# Patient Record
Sex: FEMALE | Race: WHITE | NOT HISPANIC OR LATINO | Employment: OTHER | URBAN - NONMETROPOLITAN AREA
[De-identification: names, ages, dates, MRNs, and addresses within clinical notes are randomized per-mention and may not be internally consistent; named-entity substitution may affect disease eponyms.]

---

## 2023-04-03 ENCOUNTER — APPOINTMENT (OUTPATIENT)
Dept: PRIMARY CARE | Facility: CLINIC | Age: 68
End: 2023-04-03
Payer: MEDICARE

## 2023-04-03 PROBLEM — L91.8 SKIN TAGS, MULTIPLE ACQUIRED: Status: ACTIVE | Noted: 2023-04-03

## 2023-04-03 PROBLEM — R79.89 ABNORMAL LFTS (LIVER FUNCTION TESTS): Status: ACTIVE | Noted: 2023-04-03

## 2023-04-03 PROBLEM — N18.32 STAGE 3B CHRONIC KIDNEY DISEASE (MULTI): Status: ACTIVE | Noted: 2023-04-03

## 2023-04-03 PROBLEM — M79.605 LEFT LEG PAIN: Status: ACTIVE | Noted: 2023-04-03

## 2023-04-03 PROBLEM — M79.89 SWELLING OF LEFT LOWER EXTREMITY: Status: ACTIVE | Noted: 2023-04-03

## 2023-04-03 PROBLEM — N28.9 RENAL INSUFFICIENCY, MILD: Status: ACTIVE | Noted: 2023-04-03

## 2023-04-03 PROBLEM — M25.551 RIGHT HIP PAIN: Status: ACTIVE | Noted: 2023-04-03

## 2023-04-03 PROBLEM — G47.419 NARCOLEPSY (HHS-HCC): Status: ACTIVE | Noted: 2023-04-03

## 2023-04-03 PROBLEM — R29.898 LEG WEAKNESS: Status: ACTIVE | Noted: 2023-04-03

## 2023-04-03 PROBLEM — T84.84XA PAIN DUE TO TOTAL KNEE REPLACEMENT, INITIAL ENCOUNTER (CMS-HCC): Status: ACTIVE | Noted: 2023-04-03

## 2023-04-03 PROBLEM — E04.9 GOITER: Status: ACTIVE | Noted: 2023-04-03

## 2023-04-03 PROBLEM — M25.562 LEFT KNEE PAIN: Status: ACTIVE | Noted: 2023-04-03

## 2023-04-03 PROBLEM — E04.1 THYROID NODULE: Status: ACTIVE | Noted: 2023-04-03

## 2023-04-03 PROBLEM — M79.672 FOOT PAIN, LEFT: Status: ACTIVE | Noted: 2023-04-03

## 2023-04-03 PROBLEM — G25.81 RESTLESS LEGS SYNDROME (RLS): Status: ACTIVE | Noted: 2023-04-03

## 2023-04-03 PROBLEM — E86.0 DEHYDRATION: Status: ACTIVE | Noted: 2023-04-03

## 2023-04-03 PROBLEM — L71.9 ROSACEA: Status: ACTIVE | Noted: 2023-04-03

## 2023-04-03 PROBLEM — I10 ESSENTIAL (PRIMARY) HYPERTENSION: Status: ACTIVE | Noted: 2023-04-03

## 2023-04-03 PROBLEM — J45.909 ASTHMATIC BRONCHITIS (HHS-HCC): Status: ACTIVE | Noted: 2023-04-03

## 2023-04-03 PROBLEM — M19.90 ARTHRITIS: Status: ACTIVE | Noted: 2023-04-03

## 2023-04-03 PROBLEM — Z96.659 PAIN DUE TO TOTAL KNEE REPLACEMENT, INITIAL ENCOUNTER (CMS-HCC): Status: ACTIVE | Noted: 2023-04-03

## 2023-04-03 PROBLEM — D64.9 ANEMIA: Status: ACTIVE | Noted: 2023-04-03

## 2023-04-03 PROBLEM — T50.905S: Status: ACTIVE | Noted: 2023-04-03

## 2023-04-03 RX ORDER — AMOXICILLIN 500 MG/1
1 CAPSULE ORAL DAILY
COMMUNITY
End: 2023-09-15 | Stop reason: SDUPTHER

## 2023-04-03 RX ORDER — NAPROXEN SODIUM 220 MG/1
TABLET, FILM COATED ORAL
COMMUNITY
End: 2024-02-14 | Stop reason: WASHOUT

## 2023-04-03 RX ORDER — ROPINIROLE 2 MG/1
1 TABLET, FILM COATED ORAL NIGHTLY
Status: ON HOLD | COMMUNITY

## 2023-04-03 RX ORDER — DEXTROAMPHETAMINE SACCHARATE, AMPHETAMINE ASPARTATE, DEXTROAMPHETAMINE SULFATE AND AMPHETAMINE SULFATE 5; 5; 5; 5 MG/1; MG/1; MG/1; MG/1
1 TABLET ORAL DAILY
Status: ON HOLD | COMMUNITY

## 2023-04-03 RX ORDER — AMLODIPINE BESYLATE 5 MG/1
1 TABLET ORAL NIGHTLY
Status: ON HOLD | COMMUNITY

## 2023-04-03 RX ORDER — PAROXETINE HYDROCHLORIDE 20 MG/1
2 TABLET, FILM COATED ORAL DAILY
COMMUNITY
End: 2023-10-31

## 2023-04-03 RX ORDER — CYCLOBENZAPRINE HCL 10 MG
1 TABLET ORAL NIGHTLY
COMMUNITY
End: 2024-05-23 | Stop reason: ALTCHOICE

## 2023-04-03 RX ORDER — GLUCOSAMINE/CHONDR SU A SOD 167-133 MG
500 CAPSULE ORAL
Status: ON HOLD | COMMUNITY

## 2023-04-03 RX ORDER — DEXTROAMPHETAMINE SACCHARATE, AMPHETAMINE ASPARTATE, DEXTROAMPHETAMINE SULFATE AND AMPHETAMINE SULFATE 2.5; 2.5; 2.5; 2.5 MG/1; MG/1; MG/1; MG/1
10 TABLET ORAL 3 TIMES DAILY
Status: ON HOLD | COMMUNITY

## 2023-04-03 RX ORDER — MELATONIN 3 MG
3 CAPSULE ORAL NIGHTLY
Status: ON HOLD | COMMUNITY

## 2023-04-03 RX ORDER — PITOLISANT HYDROCHLORIDE 17.8 MG/1
2 TABLET, FILM COATED ORAL EVERY MORNING
Status: ON HOLD | COMMUNITY

## 2023-09-15 DIAGNOSIS — L71.9 ROSACEA: Primary | ICD-10-CM

## 2023-09-18 RX ORDER — AMOXICILLIN 500 MG/1
500 CAPSULE ORAL DAILY
Qty: 60 CAPSULE | Refills: 0 | Status: SHIPPED | OUTPATIENT
Start: 2023-09-18 | End: 2023-12-01 | Stop reason: SDUPTHER

## 2023-10-18 ENCOUNTER — TELEPHONE (OUTPATIENT)
Dept: PRIMARY CARE | Facility: CLINIC | Age: 68
End: 2023-10-18
Payer: MEDICARE

## 2023-10-23 ENCOUNTER — APPOINTMENT (OUTPATIENT)
Dept: PRIMARY CARE | Facility: CLINIC | Age: 68
End: 2023-10-23
Payer: MEDICARE

## 2023-10-31 ENCOUNTER — TELEPHONE (OUTPATIENT)
Dept: PRIMARY CARE | Facility: CLINIC | Age: 68
End: 2023-10-31

## 2023-10-31 ENCOUNTER — OFFICE VISIT (OUTPATIENT)
Dept: PRIMARY CARE | Facility: CLINIC | Age: 68
End: 2023-10-31
Payer: MEDICARE

## 2023-10-31 VITALS
OXYGEN SATURATION: 98 % | TEMPERATURE: 97.4 F | BODY MASS INDEX: 41.52 KG/M2 | DIASTOLIC BLOOD PRESSURE: 84 MMHG | HEART RATE: 98 BPM | WEIGHT: 227 LBS | SYSTOLIC BLOOD PRESSURE: 134 MMHG

## 2023-10-31 DIAGNOSIS — M16.0 PRIMARY OSTEOARTHRITIS OF BOTH HIPS: Primary | ICD-10-CM

## 2023-10-31 PROCEDURE — 3079F DIAST BP 80-89 MM HG: CPT | Performed by: FAMILY MEDICINE

## 2023-10-31 PROCEDURE — 99214 OFFICE O/P EST MOD 30 MIN: CPT | Performed by: FAMILY MEDICINE

## 2023-10-31 PROCEDURE — 1159F MED LIST DOCD IN RCRD: CPT | Performed by: FAMILY MEDICINE

## 2023-10-31 PROCEDURE — 1036F TOBACCO NON-USER: CPT | Performed by: FAMILY MEDICINE

## 2023-10-31 PROCEDURE — 3075F SYST BP GE 130 - 139MM HG: CPT | Performed by: FAMILY MEDICINE

## 2023-10-31 RX ORDER — MAGNESIUM 250 MG
250 TABLET ORAL DAILY
Status: ON HOLD | COMMUNITY

## 2023-10-31 RX ORDER — ASCORBIC ACID 500 MG
1000 TABLET ORAL
Status: ON HOLD | COMMUNITY

## 2023-10-31 RX ORDER — PAROXETINE 30 MG/1
TABLET, FILM COATED ORAL
COMMUNITY
End: 2024-02-14 | Stop reason: WASHOUT

## 2023-10-31 RX ORDER — ASPIRIN 81 MG/1
81 TABLET ORAL NIGHTLY
Status: ON HOLD | COMMUNITY

## 2023-10-31 ASSESSMENT — ENCOUNTER SYMPTOMS
EYE DISCHARGE: 0
WHEEZING: 0
HEADACHES: 0
DIZZINESS: 0
COUGH: 0
JOINT SWELLING: 0
RHINORRHEA: 0
ABDOMINAL PAIN: 0
UNEXPECTED WEIGHT CHANGE: 0
BLOOD IN STOOL: 0
PALPITATIONS: 0
DIARRHEA: 0
ARTHRALGIAS: 1
NAUSEA: 0
MYALGIAS: 0
LIGHT-HEADEDNESS: 0
WEAKNESS: 0
BACK PAIN: 1
NERVOUS/ANXIOUS: 0
HEMATURIA: 0
FLANK PAIN: 0
ACTIVITY CHANGE: 0
SHORTNESS OF BREATH: 0
SLEEP DISTURBANCE: 0
DYSURIA: 0
DYSPHORIC MOOD: 0
FEVER: 0
SORE THROAT: 0
CONSTIPATION: 0
VOMITING: 0
EYE ITCHING: 0
NUMBNESS: 0
APPETITE CHANGE: 0
SINUS PRESSURE: 0

## 2023-10-31 NOTE — PROGRESS NOTES
Subjective   Patient ID: Jojo Martin is a 68 y.o. female who presents for Nausea (Once every 6 months. All day.  [place] water helps. Since Chemo.) and Arthritis (R groin pain has flared up again. 7 weeks currently./).    Arthritis  She reports no joint swelling. Pertinent negatives include no diarrhea, dysuria, fever or rash.    always the right side     Review of Systems   Constitutional:  Negative for activity change, appetite change, fever and unexpected weight change.   HENT:  Negative for congestion, ear pain, postnasal drip, rhinorrhea, sinus pressure and sore throat.    Eyes:  Negative for discharge, itching and visual disturbance.   Respiratory:  Negative for cough, shortness of breath and wheezing.    Cardiovascular:  Negative for chest pain, palpitations and leg swelling.   Gastrointestinal:  Negative for abdominal pain, blood in stool, constipation, diarrhea, nausea and vomiting.   Endocrine: Negative for cold intolerance, heat intolerance and polyuria.   Genitourinary:  Negative for dysuria, flank pain and hematuria.   Musculoskeletal:  Positive for arthralgias, arthritis and back pain. Negative for gait problem, joint swelling and myalgias.   Skin:  Negative for rash.   Allergic/Immunologic: Negative for environmental allergies and food allergies.   Neurological:  Negative for dizziness, syncope, weakness, light-headedness, numbness and headaches.   Psychiatric/Behavioral:  Negative for dysphoric mood and sleep disturbance. The patient is not nervous/anxious.        Objective   /84   Pulse 98   Temp 36.3 °C (97.4 °F)   Wt 103 kg (227 lb)   SpO2 98%   BMI 41.52 kg/m²     Physical Exam  Vitals and nursing note reviewed.   Constitutional:       Appearance: Normal appearance.   HENT:      Head: Normocephalic.      Mouth/Throat:      Mouth: Mucous membranes are moist.   Cardiovascular:      Rate and Rhythm: Normal rate and regular rhythm.      Pulses: Normal pulses.      Heart sounds: Normal  heart sounds. No murmur heard.     No friction rub. No gallop.   Pulmonary:      Effort: Pulmonary effort is normal. No respiratory distress.      Breath sounds: Normal breath sounds. No wheezing.   Abdominal:      General: Bowel sounds are normal. There is no distension.      Palpations: Abdomen is soft.      Tenderness: There is no abdominal tenderness.   Musculoskeletal:         General: No deformity. Normal range of motion.   Skin:     General: Skin is warm and dry.      Capillary Refill: Capillary refill takes less than 2 seconds.   Neurological:      General: No focal deficit present.      Mental Status: She is alert and oriented to person, place, and time.   Psychiatric:         Mood and Affect: Mood normal.       Assessment/Plan   Diagnoses and all orders for this visit:  Primary osteoarthritis of both hips

## 2023-11-01 DIAGNOSIS — M19.90 ARTHRITIS: ICD-10-CM

## 2023-11-01 DIAGNOSIS — N18.32 STAGE 3B CHRONIC KIDNEY DISEASE (MULTI): ICD-10-CM

## 2023-11-01 DIAGNOSIS — M25.551 RIGHT HIP PAIN: Primary | ICD-10-CM

## 2023-11-01 RX ORDER — MELOXICAM 7.5 MG/1
7.5 TABLET ORAL DAILY
Qty: 30 TABLET | Refills: 11 | Status: ON HOLD | OUTPATIENT
Start: 2023-11-01 | End: 2024-10-31

## 2023-11-02 NOTE — TELEPHONE ENCOUNTER
Jojo called and stated that she cannot take Mobic.  She says there is Motrin in it and she has had a bad reaction over 30 yrs ago with it.   She is requesting Etodolac to be sent if possible.

## 2023-11-15 ENCOUNTER — TELEPHONE (OUTPATIENT)
Dept: PRIMARY CARE | Facility: CLINIC | Age: 68
End: 2023-11-15
Payer: MEDICARE

## 2023-11-15 DIAGNOSIS — M19.90 ARTHRITIS: ICD-10-CM

## 2023-11-15 DIAGNOSIS — M25.551 RIGHT HIP PAIN: Primary | ICD-10-CM

## 2023-11-15 DIAGNOSIS — M25.551 RIGHT HIP PAIN: ICD-10-CM

## 2023-11-15 NOTE — TELEPHONE ENCOUNTER
Jojo called asking if she can get a referral to PT for her arthritis in her hips. She was seen 10/30/23 for this.

## 2023-11-29 ENCOUNTER — EVALUATION (OUTPATIENT)
Dept: PHYSICAL THERAPY | Facility: HOSPITAL | Age: 68
End: 2023-11-29
Payer: MEDICARE

## 2023-11-29 DIAGNOSIS — M25.551 RIGHT HIP PAIN: ICD-10-CM

## 2023-11-29 PROCEDURE — 97110 THERAPEUTIC EXERCISES: CPT | Mod: GP

## 2023-11-29 PROCEDURE — 97162 PT EVAL MOD COMPLEX 30 MIN: CPT | Mod: GP

## 2023-11-29 ASSESSMENT — PAIN SCALES - GENERAL: PAINLEVEL_OUTOF10: 7

## 2023-11-29 ASSESSMENT — PAIN - FUNCTIONAL ASSESSMENT: PAIN_FUNCTIONAL_ASSESSMENT: 0-10

## 2023-11-29 ASSESSMENT — PAIN DESCRIPTION - DESCRIPTORS: DESCRIPTORS: DULL

## 2023-11-29 NOTE — PROGRESS NOTES
Physical Therapy    Physical Therapy Evaluation and Treatment      Patient Name: Jojo Martin  MRN: [id number]  Today's Date: 11/29/2023  Time Calculation  Start Time: 1345  Stop Time: 1430  Time Calculation (min): 45 min    Assessment:  The patient was evaluated and found to have significant functional mobility deficits which interfere with their ability to perform their required ADL and leisure activities. They will benefit from a course of skilled Physical Therapy to improve musculoskeletal function and allow return to these activities at maximal achievable level and as close to their prior level of function as possible.     PT Assessment    PT Assessment Results: Decreased strength, Decreased range of motion, Decreased endurance, Impaired balance, Decreased mobility, Pain  Rehab Prognosis: Good  Evaluation/Treatment Tolerance: Patient tolerated treatment well     Plan:  OP PT Plan  Treatment/Interventions: Education/ Instruction, Gait training, Therapeutic activities, Therapeutic exercises  PT Plan: Skilled PT  PT Frequency: 2 times per week  Duration: 4 wks  Rehab Potential: Good    Current Problem:   1. Right hip pain  Referral to Physical Therapy    Follow Up In Physical Therapy          Subjective    General:  General  Reason for Referral: Eval and treat 2/2 R hip pain    Referred By: Dr. Greenfield    Past Medical History Relevant to Rehab: Patient had onset R hip pain July 2022 of insidious onset. [place] and OA was diagnosed. Confirmed diagnosis with her PCP. The patient has had recent recurrence of pain over last 2 months.  General Comment: Patient hopes to reduce pain if able.    Precautions:  Precautions  LE Weight Bearing Status: Weight Bearing as Tolerated    Pain:  Pain Assessment  Pain Assessment: 0-10  Pain Score: 7  Pain Location: Groin  Pain Orientation: Right  Pain Descriptors: Dull  Pain Frequency: Constant/continuous  Clinical Progression: Gradually worsening  Effect of Pain  on Daily Activities: Patient reducing time spent in standing/walking.     Prior Level of Function:  Prior Function Per Pt/Caregiver Report  Level of [place]: Independent with ADLs and functional transfers  Vocational: On disability  Leisure: sewing    Objective   General Assessments:  Posture Comment: increase lumbar lordosis posture    Range of Motion Comments: WFL    Strength Comments: 4/5 hip flexion/abduction and extension.    Flexibility Comment: tight anterior hip and anterior thigh musculature.    Functional Assessments:  Gait Comment: Uses QC on occasion but did not think to bring today.   and Balance Comment: L 8 sec, R 9 sec    Outcome Measures:  Timed Up and Go Test  TUG Manual: 15.7    Other Measures  5x Sit to Stand: 24 sec  Lower Extremity Funtional Score (LEFS): 7     Treatments:  Therapeutic Exercise  Therapeutic Exercise Performed: Yes  Therapeutic Exercise Activity 1: Pro2 bike 5 min  Therapeutic Exercise Activity 2: anterior hip stretch in standing 2x15 sec bilaterally.    EDUCATION:  Outpatient Education  Education Provided: Anatomy, Body Mechanics    Goals:  Active       PT Problem       The patient will demonstrate full understanding and competent performance of their home exercise program to maintain or potentially further improve their presenting condition.        Start:  11/29/23    Expected End:  12/29/23            The patient will report a reduction in pain to <1/10 to allow them to return to required/desired ADL activities.        Start:  11/29/23    Expected End:  12/29/23            The patient will ambulate 10 minutes continuously without pain increasing to greater than 1/10 to allow return to required community ambulation tasks.        Start:  11/29/23    Expected End:  12/29/23            The patient will improve their performance in the 5 time sit to stand test to < 15 sec to demonstrate increased balance and LE strength required for safe functional transfers and gait.         Start:  11/29/23    Expected End:  12/29/23            The patient will decrease their time in the timed up and go to <15 seconds to demonstrate improved balance and functional ambulatory ability.        Start:  11/29/23    Expected End:  12/29/23            Increase LEFS to >55       Start:  11/29/23    Expected End:  12/29/23            The patient will improve their time in unipedal stance to >= 5 to demonstrate increased balance with it's commensurate improvement in gait safety.        Start:  11/29/23    Expected End:  12/29/23

## 2023-12-01 DIAGNOSIS — L71.9 ROSACEA: ICD-10-CM

## 2023-12-01 RX ORDER — AMOXICILLIN 500 MG/1
500 CAPSULE ORAL DAILY
Qty: 60 CAPSULE | Refills: 0 | Status: SHIPPED | OUTPATIENT
Start: 2023-12-01 | End: 2024-02-28 | Stop reason: SDUPTHER

## 2023-12-05 ENCOUNTER — APPOINTMENT (OUTPATIENT)
Dept: PHYSICAL THERAPY | Facility: HOSPITAL | Age: 68
End: 2023-12-05
Payer: MEDICARE

## 2023-12-06 ENCOUNTER — TREATMENT (OUTPATIENT)
Dept: PHYSICAL THERAPY | Facility: HOSPITAL | Age: 68
End: 2023-12-06
Payer: MEDICARE

## 2023-12-06 DIAGNOSIS — M25.551 RIGHT HIP PAIN: ICD-10-CM

## 2023-12-06 PROCEDURE — 97110 THERAPEUTIC EXERCISES: CPT | Mod: GP

## 2023-12-06 ASSESSMENT — PAIN SCALES - GENERAL: PAINLEVEL_OUTOF10: 0 - NO PAIN

## 2023-12-06 ASSESSMENT — PAIN - FUNCTIONAL ASSESSMENT: PAIN_FUNCTIONAL_ASSESSMENT: 0-10

## 2023-12-06 NOTE — PROGRESS NOTES
Physical Therapy    Physical Therapy Treatment    Patient Name: Jojo Martin  MRN: [id number]  Today's Date: 12/6/2023  Time Calculation  Start Time: 1350  Stop Time: 1430  Time Calculation (min): 40 min      Assessment:  PT Assessment  Assessment Comment: Patient given HEP with clarification of technique today.    Plan:  OP PT Plan  Rehab Potential: Good    Current Problem  1. Right hip pain  Follow Up In Physical Therapy          General  PT  Visit  PT Received On: 12/06/23  Response to Previous Treatment: Patient with no complaints from previous session. (Patient felt stretching was beneficial and reduced pain.)       Subjective    Pain  Pain Assessment  Pain Assessment: 0-10  Pain Score: 0 - No pain    Objective   Activity Tolerance:  Activity Tolerance  Activity Tolerance Comments: tolerated increased volume of ex without complaint today.    Treatments:  Therapeutic Exercise  Therapeutic Exercise Performed: Yes  Therapeutic Exercise Activity 1: Physiosstep x 10  Therapeutic Exercise Activity 2: EOB HS [id number] sec  Therapeutic Exercise Activity 3: 90/90 R hip ER stretch 3x15 sec  Therapeutic Exercise Activity 4: standing rectus stretch 3x15 sec  Therapeutic Exercise Activity 5: hip abductor [id number]  Therapeutic Exercise Activity 6: standing hip abduction 3x10  Therapeutic Exercise Activity 7: standing hip hike 3x10    OP EDUCATION:  Outpatient Education  Individual(s) Educated: Patient  Education Provided: Anatomy, Home Exercise Program  Patient Response to Education: Patient/Caregiver Verbalized Understanding of Information  Access Code: [email or web address]  URL: [email or web address]  Date: 12/06/2023  Prepared by: Devante Richard    Exercises  - Seated Table Hamstring Stretch  - 1 x daily - 7 x weekly - 3 sets - 1 reps - 15 hold  - Standing Hip ER Stretch at Table  - 1 x daily - 7 x weekly - 3 sets - 1 reps - 15 hold  - Quadriceps Stretch with Table  - 1 x daily - 7 x weekly - 3 sets - 1  reps - 15 hold  - Standing Hip Hiking  - 1 x daily - 7 x weekly - 3 sets - 10 reps  - Standing Hip Abduction  - 1 x daily - 7 x weekly - 3 sets - 10 reps    Goals:  Active       PT Problem       The patient will demonstrate full understanding and competent performance of their home exercise program to maintain or potentially further improve their presenting condition.        Start:  11/29/23    Expected End:  12/29/23            The patient will report a reduction in pain to <1/10 to allow them to return to required/desired ADL activities.        Start:  11/29/23    Expected End:  12/29/23            The patient will ambulate 10 minutes continuously without pain increasing to greater than 1/10 to allow return to required community ambulation tasks.        Start:  11/29/23    Expected End:  12/29/23            The patient will improve their performance in the 5 time sit to stand test to < 15 sec to demonstrate increased balance and LE strength required for safe functional transfers and gait.        Start:  11/29/23    Expected End:  12/29/23            The patient will decrease their time in the timed up and go to <15 seconds to demonstrate improved balance and functional ambulatory ability.        Start:  11/29/23    Expected End:  12/29/23            Increase LEFS to >55       Start:  11/29/23    Expected End:  12/29/23            The patient will improve their time in unipedal stance to >= 10 to demonstrate increased balance with it's commensurate improvement in gait safety.        Start:  11/29/23    Expected End:  12/29/23

## 2023-12-08 ENCOUNTER — TREATMENT (OUTPATIENT)
Dept: PHYSICAL THERAPY | Facility: HOSPITAL | Age: 68
End: 2023-12-08
Payer: MEDICARE

## 2023-12-08 DIAGNOSIS — M25.551 RIGHT HIP PAIN: ICD-10-CM

## 2023-12-08 PROCEDURE — 97110 THERAPEUTIC EXERCISES: CPT | Mod: GP

## 2023-12-08 ASSESSMENT — PAIN - FUNCTIONAL ASSESSMENT: PAIN_FUNCTIONAL_ASSESSMENT: 0-10

## 2023-12-08 ASSESSMENT — PAIN SCALES - GENERAL: PAINLEVEL_OUTOF10: 0 - NO PAIN

## 2023-12-08 NOTE — PROGRESS NOTES
Physical Therapy    Physical Therapy Treatment    Patient Name: Jojo Martin  MRN: [id number]  Today's Date: 12/8/2023  Time Calculation  Start Time: 1301  Stop Time: 1350  Time Calculation (min): 49 min      Assessment:  PT Assessment  Assessment Comment: Progressing well at this early juncture.    Plan:  OP PT Plan  PT Plan: Skilled PT (3/8)    Current Problem  1. Right hip pain  Follow Up In Physical Therapy          General  PT  Visit  PT Received On: 12/08/23  Response to Previous Treatment: Patient with no complaints from previous session.  General  General Comment: Patient compliant with HEP thus far.    Subjective    Pain  Pain Assessment  Pain Assessment: 0-10  Pain Score: 0 - No pain    Objective     Activity Tolerance:  Activity Tolerance  Activity Tolerance Comments: form improving without noted pain issues    Treatments:  Therapeutic Exercise  Therapeutic Exercise Performed: Yes  Therapeutic Exercise Activity 1: Physiosstep x 10  Therapeutic Exercise Activity 2: EOB HS stretch 3x30 sec  Therapeutic Exercise Activity 3: 90/90 R hip ER stretch 3x30 sec  Therapeutic Exercise Activity 4: standing rectus stretch 3x30 sec  Therapeutic Exercise Activity 5: hip abductor [id number]#  Therapeutic Exercise Activity 6: standing hip abduction 3x10  Therapeutic Exercise Activity 7: standing hip hike(marches) 3x10  Therapeutic Exercise Activity 8: Wade hip/back extension 3x10    Goals:  Active       PT Problem       The patient will demonstrate full understanding and competent performance of their home exercise program to maintain or potentially further improve their presenting condition.        Start:  11/29/23    Expected End:  12/29/23            The patient will report a reduction in pain to <1/10 to allow them to return to required/desired ADL activities.        Start:  11/29/23    Expected End:  12/29/23            The patient will ambulate 10 minutes continuously without pain increasing to greater than  1/10 to allow return to required community ambulation tasks.        Start:  11/29/23    Expected End:  12/29/23            The patient will improve their performance in the 5 time sit to stand test to < 15 sec to demonstrate increased balance and LE strength required for safe functional transfers and gait.        Start:  11/29/23    Expected End:  12/29/23            The patient will decrease their time in the timed up and go to <15 seconds to demonstrate improved balance and functional ambulatory ability.        Start:  11/29/23    Expected End:  12/29/23            Increase LEFS to >55       Start:  11/29/23    Expected End:  12/29/23            The patient will improve their time in unipedal stance to >= 10 to demonstrate increased balance with it's commensurate improvement in gait safety.        Start:  11/29/23    Expected End:  12/29/23

## 2023-12-12 ENCOUNTER — TREATMENT (OUTPATIENT)
Dept: PHYSICAL THERAPY | Facility: HOSPITAL | Age: 68
End: 2023-12-12
Payer: MEDICARE

## 2023-12-12 DIAGNOSIS — M25.551 RIGHT HIP PAIN: ICD-10-CM

## 2023-12-12 PROCEDURE — 97110 THERAPEUTIC EXERCISES: CPT | Mod: GP,CQ

## 2023-12-12 ASSESSMENT — PAIN - FUNCTIONAL ASSESSMENT: PAIN_FUNCTIONAL_ASSESSMENT: 0-10

## 2023-12-12 ASSESSMENT — PAIN SCALES - GENERAL: PAINLEVEL_OUTOF10: 4

## 2023-12-12 NOTE — PROGRESS NOTES
Physical Therapy    Physical Therapy Treatment    Patient Name: Jojo Martin  MRN: [id number]  Today's Date: 12/12/2023  Time Calculation  Start Time: 1305  Stop Time: 1347  Time Calculation (min): 42 min      Assessment:   Patient tolerated all therapeutic interventions well. Making progress towards goals .   Plan:  OP PT Plan  PT Plan: Skilled PT (4/5 tx)  Continue per POC and as patient tolerated   Current Problem  1. Right hip pain  Follow Up In Physical Therapy          General  PT  Visit  PT Received On: 12/12/23  General  General Comment: Patient arrives to session late. States she continues to have some pain and soreness    Pain  Pain Assessment  Pain Assessment: 0-10  Pain Score: 4  Pain Location: Leg  Pain Orientation: Right, Left      Treatments:  Therapeutic Exercise  Therapeutic Exercise Performed: Yes    Physiostep x10 min, [place] hip ext and back ext 3x10 each, EOB HS stretch 3x30 sec, hip ER stretch 90/90 3x30 sec, anterior hip stretch on chair 3x30 sec, hip abd machine [id number]# 3x10, standing alt hip abd 3x10     Goals:  Active       PT Problem       The patient will demonstrate full understanding and competent performance of their home exercise program to maintain or potentially further improve their presenting condition.  (Progressing)       Start:  11/29/23    Expected End:  12/29/23            The patient will report a reduction in pain to <1/10 to allow them to return to required/desired ADL activities.  (Progressing)       Start:  11/29/23    Expected End:  12/29/23            The patient will ambulate 10 minutes continuously without pain increasing to greater than 1/10 to allow return to required community ambulation tasks.  (Progressing)       Start:  11/29/23    Expected End:  12/29/23            The patient will improve their performance in the 5 time sit to stand test to < 15 sec to demonstrate increased balance and LE strength required for safe functional transfers and gait.   (Progressing)       Start:  11/29/23    Expected End:  12/29/23            The patient will decrease their time in the timed up and go to <15 seconds to demonstrate improved balance and functional ambulatory ability.  (Progressing)       Start:  11/29/23    Expected End:  12/29/23            Increase LEFS to >55 (Progressing)       Start:  11/29/23    Expected End:  12/29/23            The patient will improve their time in unipedal stance to >= 10 to demonstrate increased balance with it's commensurate improvement in gait safety.  (Progressing)       Start:  11/29/23    Expected End:  12/29/23

## 2023-12-12 NOTE — PROGRESS NOTES
Physical Therapy                 Therapy Communication Note    Patient Name: Jojo Martin  MRN: [id number]  Today's Date: 12/12/2023     Discipline: Physical Therapy    Missed Visit Reason: Missed Visit Reason: No show    Missed Time: No Show    Comment: Called patient and left voicemail about missed visit and on next scheduled visit.

## 2023-12-15 ENCOUNTER — APPOINTMENT (OUTPATIENT)
Dept: PHYSICAL THERAPY | Facility: HOSPITAL | Age: 68
End: 2023-12-15
Payer: MEDICARE

## 2023-12-18 ENCOUNTER — APPOINTMENT (OUTPATIENT)
Dept: PHYSICAL THERAPY | Facility: HOSPITAL | Age: 68
End: 2023-12-18
Payer: MEDICARE

## 2023-12-27 ENCOUNTER — DOCUMENTATION (OUTPATIENT)
Dept: PHYSICAL THERAPY | Facility: HOSPITAL | Age: 68
End: 2023-12-27

## 2023-12-27 NOTE — PROGRESS NOTES
No showPhysical Therapy                 Therapy Communication Note    Patient Name: Jojo Martin  MRN: [id number]  Today's Date: 12/27/2023     Discipline: Physical Therapy    Missed Time: No Show

## 2024-02-09 ENCOUNTER — OFFICE VISIT (OUTPATIENT)
Dept: PRIMARY CARE | Facility: CLINIC | Age: 69
End: 2024-02-09
Payer: MEDICARE

## 2024-02-09 VITALS
OXYGEN SATURATION: 99 % | TEMPERATURE: 96.8 F | BODY MASS INDEX: 41.7 KG/M2 | HEART RATE: 57 BPM | SYSTOLIC BLOOD PRESSURE: 104 MMHG | WEIGHT: 228 LBS | DIASTOLIC BLOOD PRESSURE: 60 MMHG

## 2024-02-09 DIAGNOSIS — M25.551 RIGHT HIP PAIN: Primary | ICD-10-CM

## 2024-02-09 PROCEDURE — 99213 OFFICE O/P EST LOW 20 MIN: CPT | Performed by: FAMILY MEDICINE

## 2024-02-09 PROCEDURE — 1157F ADVNC CARE PLAN IN RCRD: CPT | Performed by: FAMILY MEDICINE

## 2024-02-09 PROCEDURE — 1036F TOBACCO NON-USER: CPT | Performed by: FAMILY MEDICINE

## 2024-02-09 PROCEDURE — 3074F SYST BP LT 130 MM HG: CPT | Performed by: FAMILY MEDICINE

## 2024-02-09 PROCEDURE — 3078F DIAST BP <80 MM HG: CPT | Performed by: FAMILY MEDICINE

## 2024-02-09 PROCEDURE — 1159F MED LIST DOCD IN RCRD: CPT | Performed by: FAMILY MEDICINE

## 2024-02-09 PROCEDURE — 1125F AMNT PAIN NOTED PAIN PRSNT: CPT | Performed by: FAMILY MEDICINE

## 2024-02-09 RX ORDER — PAROXETINE HYDROCHLORIDE 20 MG/1
TABLET, FILM COATED ORAL
Status: ON HOLD | COMMUNITY
Start: 2023-12-18

## 2024-02-14 ASSESSMENT — ENCOUNTER SYMPTOMS
MYALGIAS: 0
ABDOMINAL PAIN: 0
DIZZINESS: 0
ACTIVITY CHANGE: 0
EYE ITCHING: 0
NUMBNESS: 0
HEADACHES: 0
EYE DISCHARGE: 0
FLANK PAIN: 0
WEAKNESS: 0
PALPITATIONS: 0
FEVER: 0
BLOOD IN STOOL: 0
SORE THROAT: 0
CONSTIPATION: 0
WHEEZING: 0
DYSURIA: 0
SLEEP DISTURBANCE: 0
NAUSEA: 0
VOMITING: 0
DIARRHEA: 0
HIP PAIN: 1
UNEXPECTED WEIGHT CHANGE: 0
JOINT SWELLING: 0
LIGHT-HEADEDNESS: 0
SHORTNESS OF BREATH: 0
COUGH: 0
SINUS PRESSURE: 0
NERVOUS/ANXIOUS: 1
HEMATURIA: 0
ARTHRALGIAS: 1
APPETITE CHANGE: 0
RHINORRHEA: 0
DYSPHORIC MOOD: 1

## 2024-02-14 NOTE — PROGRESS NOTES
"Subjective   Patient ID: Jojo Martin is a 68 y.o. female who presents for Hip Pain (R sided- Ongoing, but worsening.  Jojo would like a PN management referral, and possibly something to help until she can be seen.  She is tearful today and state \"it just hurts all the time.  I can't do anything\".  Jojo does have a ortho appt in March with Dr. Corona.).    Hip Pain   Pertinent negatives include no numbness.    PATIENT  REQUESTED REFERRAL FOR DR CORONA AND THAT IS MADE   DID ATTEND A FEW P/T SESSIONS FOR HER RIGHT HIP PAIN /LAST ONE SHE N/S    Review of Systems   Constitutional:  Negative for activity change, appetite change, fever and unexpected weight change.   HENT:  Negative for congestion, ear pain, postnasal drip, rhinorrhea, sinus pressure and sore throat.    Eyes:  Negative for discharge, itching and visual disturbance.   Respiratory:  Negative for cough, shortness of breath and wheezing.    Cardiovascular:  Negative for chest pain, palpitations and leg swelling.   Gastrointestinal:  Negative for abdominal pain, blood in stool, constipation, diarrhea, nausea and vomiting.   Endocrine: Negative for cold intolerance, heat intolerance and polyuria.   Genitourinary:  Negative for dysuria, flank pain and hematuria.   Musculoskeletal:  Positive for arthralgias. Negative for gait problem, joint swelling and myalgias.   Skin:  Negative for rash.   Allergic/Immunologic: Negative for environmental allergies and food allergies.   Neurological:  Negative for dizziness, syncope, weakness, light-headedness, numbness and headaches.   Psychiatric/Behavioral:  Positive for dysphoric mood. Negative for sleep disturbance. The patient is nervous/anxious.        Objective   /60   Pulse 57   Temp 36 °C (96.8 °F)   Wt 103 kg (228 lb)   SpO2 99%   BMI 41.70 kg/m²     Physical Exam  Vitals and nursing note reviewed.   Constitutional:       General: She is in acute distress.      Appearance: Normal appearance.      " Comments: USING WALKER AND C/O HIP PAIN   I DO NOT SEE ANY X-RAYS OF HIP/SHE CLAIMS THERE WAS WERE SHE USED TO LIVE   X-RAYS ORDERED 2-9-24 HAVE NOT BEEN DONE    HENT:      Head: Normocephalic.   Cardiovascular:      Rate and Rhythm: Normal rate and regular rhythm.      Pulses: Normal pulses.      Heart sounds: Normal heart sounds. No murmur heard.     No friction rub. No gallop.   Pulmonary:      Effort: Pulmonary effort is normal. No respiratory distress.      Breath sounds: Normal breath sounds. No wheezing.   Abdominal:      General: Bowel sounds are normal. There is no distension.      Palpations: Abdomen is soft.      Tenderness: There is no abdominal tenderness.   Musculoskeletal:         General: No deformity.      Comments: WALKED IN ON WALKER   PT HAS SAID 4/10 WITH PAIN    Skin:     General: Skin is warm and dry.      Capillary Refill: Capillary refill takes less than 2 seconds.   Neurological:      General: No focal deficit present.      Mental Status: She is alert and oriented to person, place, and time.   Psychiatric:      Comments: PATIENT IN TEARS WHEN I LEFT AFTER SHE SAID SHE WANTED PAIN MEDICATION   I DEFERRED TO [employer]TC'S IBUPROFEN AND TYLENOL   SHE HAD MENTIONED PAIN MANAGEMENT BUT SHE DID NOT COMPLETE P/T   I SUGGESTED THAT SHE SEE DR YOUNGBLOOD AND SEE WHAT IS THE BEST COURSE OF THERAPY        Assessment/Plan

## 2024-02-28 DIAGNOSIS — L71.9 ROSACEA: ICD-10-CM

## 2024-02-29 RX ORDER — AMOXICILLIN 500 MG/1
500 CAPSULE ORAL DAILY
Qty: 60 CAPSULE | Refills: 0 | Status: SHIPPED | OUTPATIENT
Start: 2024-02-29 | End: 2024-05-17 | Stop reason: SDUPTHER

## 2024-03-01 ENCOUNTER — APPOINTMENT (OUTPATIENT)
Dept: ORTHOPEDIC SURGERY | Facility: CLINIC | Age: 69
End: 2024-03-01
Payer: MEDICARE

## 2024-03-05 ENCOUNTER — OFFICE VISIT (OUTPATIENT)
Dept: ORTHOPEDIC SURGERY | Facility: CLINIC | Age: 69
End: 2024-03-05
Payer: MEDICARE

## 2024-03-05 ENCOUNTER — HOSPITAL ENCOUNTER (OUTPATIENT)
Dept: RADIOLOGY | Facility: HOSPITAL | Age: 69
Discharge: HOME | End: 2024-03-05
Payer: MEDICARE

## 2024-03-05 DIAGNOSIS — M25.551 RIGHT HIP PAIN: Primary | ICD-10-CM

## 2024-03-05 DIAGNOSIS — M25.551 RIGHT HIP PAIN: ICD-10-CM

## 2024-03-05 PROCEDURE — 1157F ADVNC CARE PLAN IN RCRD: CPT | Performed by: ORTHOPAEDIC SURGERY

## 2024-03-05 PROCEDURE — 73502 X-RAY EXAM HIP UNI 2-3 VIEWS: CPT | Mod: RIGHT SIDE | Performed by: RADIOLOGY

## 2024-03-05 PROCEDURE — 99215 OFFICE O/P EST HI 40 MIN: CPT | Performed by: ORTHOPAEDIC SURGERY

## 2024-03-05 PROCEDURE — 73502 X-RAY EXAM HIP UNI 2-3 VIEWS: CPT | Mod: RT

## 2024-03-05 PROCEDURE — 1036F TOBACCO NON-USER: CPT | Performed by: ORTHOPAEDIC SURGERY

## 2024-03-05 PROCEDURE — 1160F RVW MEDS BY RX/DR IN RCRD: CPT | Performed by: ORTHOPAEDIC SURGERY

## 2024-03-05 PROCEDURE — 1125F AMNT PAIN NOTED PAIN PRSNT: CPT | Performed by: ORTHOPAEDIC SURGERY

## 2024-03-05 PROCEDURE — 1159F MED LIST DOCD IN RCRD: CPT | Performed by: ORTHOPAEDIC SURGERY

## 2024-03-05 RX ORDER — SODIUM CHLORIDE 9 MG/ML
100 INJECTION, SOLUTION INTRAVENOUS CONTINUOUS
Status: CANCELLED | OUTPATIENT
Start: 2024-03-05

## 2024-03-05 RX ORDER — ACETAMINOPHEN 325 MG/1
975 TABLET ORAL ONCE
Status: CANCELLED | OUTPATIENT
Start: 2024-03-05 | End: 2024-03-05

## 2024-03-05 RX ORDER — CEFAZOLIN SODIUM 2 G/100ML
2 INJECTION, SOLUTION INTRAVENOUS ONCE
Status: CANCELLED | OUTPATIENT
Start: 2024-03-05 | End: 2024-03-05

## 2024-03-05 RX ORDER — DICLOFENAC SODIUM 10 MG/G
4 GEL TOPICAL 4 TIMES DAILY
Qty: 450 G | Refills: 0 | Status: ON HOLD | OUTPATIENT
Start: 2024-03-05

## 2024-03-05 RX ORDER — CELECOXIB 400 MG/1
400 CAPSULE ORAL ONCE
Status: CANCELLED | OUTPATIENT
Start: 2024-03-05 | End: 2024-03-05

## 2024-03-05 NOTE — PROGRESS NOTES
This is a consultation from Dr. Ronel Greenfield DO for   Chief Complaint   Patient presents with    Right Hip - Pain       This is a 68 y.o. female who presents for evaluation of right hip pain.  She care of this patient many years ago for her ankle fracture.  Hip pain is issues going on for over the last year, it was on and off at first but the last several months it has been gradually getting more persistent and worsening.  She is experience severe exacerbation of her pain over the last 2 to 3 months.  Sharp pain mainly in the anterior hip and groin radiating to the anterior thigh.  She is a very difficult time walking.  She has had extensive trial of nonsurgical management including use of anti-inflammatories physical therapy activity modification use of assistive devices.  She has never had surgery or injections on that hip.  Despite nonsurgical management she has severe and worsening pain which impacts her quality of life and activities of daily living she like to consider total hip    Physical Exam    There has been no interval change in this patient's past medical, surgical, medications, allergies, family history or social history since the most recent visit to a provider within our department. 14 point review of systems was performed, reviewed, and negative except for pertinent positives documented in the history of present illness.     Constitutional: well developed, well nourished female in no acute distress  Psychiatric: normal mood, appropriate affect  Eyes: sclera anicteric  HENT: normocephalic/atraumatic  CV: regular rate and rhythm   Respiratory: non labored breathing  Integumentary: no rash  Neurological: moves all extremities    Right hip exam: skin normal, no abrasions, wounds, or lacerations. nttp over greater trochanter. negative log roll, negative landon's test. flexion to 90 degrees with pain.  Significant pain with flexion abduction and external rotation, severe groin pain with flexion  adduction and internal rotation. neurovascularly intact distally        Xrays were ordered by me, they were reviewed and independently interpreted by me today, they show severe degenerative disease bone-on-bone arthritis subchondral sclerosis osteophyte formation erosion of femoral head    Procedures      Impression/Plan: This is a 68 y.o. female with severe right hip arthritis that has failed nonoperative management.  Patient elected to proceed with right total hip.    I had an extensive discussion with the patient regarding her condition and possible treatment options. Nonsurgical treatment for right hip arthritis includes activity modification, weight loss, use of a cane or other assistive device, pain medications and nonsteroidal anti-inflammatory medications, joint injections, and physical therapy. The patient has attempted non-surgical treatment for this condition for greater than 6 months and it has failed. We discussed the risks benefits and alternatives of total hip arthroplasty. Benefits of joint replacement include: Relief of pain, improvement of function, and improved quality of life. Alternatives include observation and watchful waiting, and the nonsurgical modalities noted above.     One specific concern for this patient is their obesity.  The patient has an elevated BMI which places them at increased risk of complications related to total joint surgery especially infection. The patient has attempted to lose weight by various methods and has had limited success, but is still trying .We discussed a weight loss program and recommended the patient make an appointment with our comprehensive weight loss clinic. The patient is aware of this increased risk, still like to proceed    We discussed the risks of complications as well as the risks of morbidity and mortality related to surgical treatment with total joint replacement. We reviewed the fact that total joint replacement is major elective surgery with  significant associated surgical and procedural risk factors as detailed below.   Risks include: Pain, blood loss, damage to nearby anatomical structures including but not limited to nerves or blood vessels muscles tendons and bone, failure surgery to ameliorate symptoms, persistence of pain surrounding the affected joint, mechanical failure of the prosthesis including but not limited to loosening of the prosthesis from bone ,breakage of the prosthesis and dislocation of the prosthesis, change in length or appearance of a limb, infection possibly necessitating further surgery, removal of the prosthesis, or limb amputation, the need for additional surgery for other reasons, blood clots, amputation, and death. No guarantees were implied or given.  All questions were answered and the patient voiced their understanding.     A complete set of surgical instructions was given to the patient. The patient was educated regarding preoperative nutrition, preparation of their home for postoperative rehabilitation, choosing a care partner, medical and dental clearance, the cessation of medications that can cause bleeding or presenting to risk for infection, chlorhexidine bath, nasal swab and decontamination, post operative follow up, and need for medical equipment. Patient was also educated regarding the possibility of same day surgery and related criteria and protocols. The patient will attend our joint class further education, and thereafter they will return for a preoperative visit. A presurgery education booklet was also given to the patient.     surgical plan: Right total hip  implants: Geralod  approach: Direct anterior  DVT ppx aspirin  drugs to stop none  allergy to abx none  post operative abx: ancef +/- vanc (per protocol)  special clearance needed none    BMI Readings from Last 1 Encounters:   02/09/24 41.70 kg/m²      Lab Results   Component Value Date    CREATININE 1.04 05/13/2022     Tobacco Use: Low Risk  (3/5/2024)  "   Patient History     Smoking Tobacco Use: Never     Smokeless Tobacco Use: Never     Passive Exposure: Not on file      Computed MELD 3.0 unavailable. Necessary lab results were not found in the last year.  Computed MELD-Na unavailable. Necessary lab results were not found in the last year.       No results found for: \"HGBA1C\"  No results found for: \"STAPHMRSASCR\"  "

## 2024-03-05 NOTE — LETTER
March 5, 2024     Ronel Greenfield DO  [address]  [facility] [address]    Patient: Jojo Martin   YOB: 1955   Date of Visit: 3/5/2024       Dear Dr. Ronel Greenfield DO:    Thank you for referring Jojo Martin to me for evaluation. Below are my notes for this consultation.  If you have questions, please do not hesitate to call me. I look forward to following your patient along with you.       Sincerely,     David Corona MD      CC: No Recipients  ______________________________________________________________________________________    This is a consultation from Dr. Ronel Greenfield DO for   Chief Complaint   Patient presents with   • Right Hip - Pain       This is a 68 y.o. female who presents for evaluation of right hip pain.  She care of this patient many years ago for her ankle fracture.  Hip pain is issues going on for over the last year, it was on and off at first but the last several months it has been gradually getting more persistent and worsening.  She is experience severe exacerbation of her pain over the last 2 to 3 months.  Sharp pain mainly in the anterior hip and groin radiating to the anterior thigh.  She is a very difficult time walking.  She has had extensive trial of nonsurgical management including use of anti-inflammatories physical therapy activity modification use of assistive devices.  She has never had surgery or injections on that hip.  Despite nonsurgical management she has severe and worsening pain which impacts her quality of life and activities of daily living she like to consider total hip    Physical Exam    There has been no interval change in this patient's past medical, surgical, medications, allergies, family history or social history since the most recent visit to a provider within our department. 14 point review of systems was performed, reviewed, and negative except for pertinent positives documented in the history of present illness.      Constitutional: well developed, well nourished female in no acute distress  Psychiatric: normal mood, appropriate affect  Eyes: sclera anicteric  HENT: normocephalic/atraumatic  CV: regular rate and rhythm   Respiratory: non labored breathing  Integumentary: no rash  Neurological: moves all extremities    Right hip exam: skin normal, no abrasions, wounds, or lacerations. nttp over greater trochanter. negative log roll, negative landon's test. flexion to 90 degrees with pain.  Significant pain with flexion abduction and external rotation, severe groin pain with flexion adduction and internal rotation. neurovascularly intact distally        Xrays were ordered by me, they were reviewed and independently interpreted by me today, they show severe degenerative disease bone-on-bone arthritis subchondral sclerosis osteophyte formation erosion of femoral head    Procedures      Impression/Plan: This is a 68 y.o. female with severe right hip arthritis that has failed nonoperative management.  Patient elected to proceed with right total hip.    I had an extensive discussion with the patient regarding her condition and possible treatment options. Nonsurgical treatment for right hip arthritis includes activity modification, weight loss, use of a cane or other assistive device, pain medications and nonsteroidal anti-inflammatory medications, joint injections, and physical therapy. The patient has attempted non-surgical treatment for this condition for greater than 6 months and it has failed. We discussed the risks benefits and alternatives of total hip arthroplasty. Benefits of joint replacement include: Relief of pain, improvement of function, and improved quality of life. Alternatives include observation and watchful waiting, and the nonsurgical modalities noted above.     One specific concern for this patient is their obesity.  The patient has an elevated BMI which places them at increased risk of complications related to  total joint surgery especially infection. The patient has attempted to lose weight by various methods and has had limited success, but is still trying .We discussed a weight loss program and recommended the patient make an appointment with our comprehensive weight loss clinic. The patient is aware of this increased risk, still like to proceed    We discussed the risks of complications as well as the risks of morbidity and mortality related to surgical treatment with total joint replacement. We reviewed the fact that total joint replacement is major elective surgery with significant associated surgical and procedural risk factors as detailed below.   Risks include: Pain, blood loss, damage to nearby anatomical structures including but not limited to nerves or blood vessels muscles tendons and bone, failure surgery to ameliorate symptoms, persistence of pain surrounding the affected joint, mechanical failure of the prosthesis including but not limited to loosening of the prosthesis from bone ,breakage of the prosthesis and dislocation of the prosthesis, change in length or appearance of a limb, infection possibly necessitating further surgery, removal of the prosthesis, or limb amputation, the need for additional surgery for other reasons, blood clots, amputation, and death. No guarantees were implied or given.  All questions were answered and the patient voiced their understanding.     A complete set of surgical instructions was given to the patient. The patient was educated regarding preoperative nutrition, preparation of their home for postoperative rehabilitation, choosing a care partner, medical and dental clearance, the cessation of medications that can cause bleeding or presenting to risk for infection, chlorhexidine bath, nasal swab and decontamination, post operative follow up, and need for medical equipment. Patient was also educated regarding the possibility of same day surgery and related criteria and  "protocols. The patient will attend our joint class further education, and thereafter they will return for a preoperative visit. A presurgery education booklet was also given to the patient.     surgical plan: Right total hip  implants: Geraldo  approach: Direct anterior  DVT ppx aspirin  drugs to stop none  allergy to abx none  post operative abx: ancef +/- vanc (per protocol)  special clearance needed none    BMI Readings from Last 1 Encounters:   02/09/24 41.70 kg/m²      Lab Results   Component Value Date    CREATININE 1.04 05/13/2022     Tobacco Use: Low Risk  (3/5/2024)    Patient History    • Smoking Tobacco Use: Never    • Smokeless Tobacco Use: Never    • Passive Exposure: Not on file      Computed MELD 3.0 unavailable. Necessary lab results were not found in the last year.  Computed MELD-Na unavailable. Necessary lab results were not found in the last year.       No results found for: \"HGBA1C\"  No results found for: \"STAPHMRSASCR\"  "

## 2024-03-12 ENCOUNTER — TELEPHONE (OUTPATIENT)
Dept: ORTHOPEDIC SURGERY | Facility: CLINIC | Age: 69
End: 2024-03-12
Payer: MEDICARE

## 2024-03-12 NOTE — TELEPHONE ENCOUNTER
6/10/24 RT SNEHA  Patient wanted Dr. Corona to be aware she takes Amoxicllin 500mg tablets for Rosacea.

## 2024-04-16 ENCOUNTER — TELEPHONE (OUTPATIENT)
Dept: ORTHOPEDIC SURGERY | Facility: CLINIC | Age: 69
End: 2024-04-16
Payer: MEDICARE

## 2024-04-16 DIAGNOSIS — M25.551 RIGHT HIP PAIN: Primary | ICD-10-CM

## 2024-04-16 NOTE — TELEPHONE ENCOUNTER
6/10/24 rt heavenly  Patient called and can get in with pain management next week. Would like a referral placed to set up her appointment.    Fax: [phone]

## 2024-04-30 ENCOUNTER — APPOINTMENT (OUTPATIENT)
Dept: PRIMARY CARE | Facility: CLINIC | Age: 69
End: 2024-04-30
Payer: MEDICARE

## 2024-05-17 DIAGNOSIS — L71.9 ROSACEA: ICD-10-CM

## 2024-05-20 RX ORDER — AMOXICILLIN 500 MG/1
500 CAPSULE ORAL DAILY
Qty: 60 CAPSULE | Refills: 3 | Status: ON HOLD | OUTPATIENT
Start: 2024-05-20

## 2024-05-22 ENCOUNTER — TELEPHONE (OUTPATIENT)
Dept: ORTHOPEDIC SURGERY | Facility: CLINIC | Age: 69
End: 2024-05-22
Payer: MEDICARE

## 2024-05-22 DIAGNOSIS — M25.551 RIGHT HIP PAIN: ICD-10-CM

## 2024-05-22 NOTE — TELEPHONE ENCOUNTER
Last office visit- 3/5/24  Has an upcoming surgery with Dr. Corona: Right SNEHA on 6/10/24    Patient called requesting a RX for a rollator to be sent to [facility] in [place].    I have pended the order and captured the correct pharmacy.

## 2024-05-23 ENCOUNTER — PRE-ADMISSION TESTING (OUTPATIENT)
Dept: PREADMISSION TESTING | Facility: HOSPITAL | Age: 69
End: 2024-05-23
Payer: MEDICARE

## 2024-05-23 VITALS
WEIGHT: 237.88 LBS | RESPIRATION RATE: 18 BRPM | SYSTOLIC BLOOD PRESSURE: 145 MMHG | OXYGEN SATURATION: 97 % | BODY MASS INDEX: 43.77 KG/M2 | TEMPERATURE: 97.3 F | DIASTOLIC BLOOD PRESSURE: 83 MMHG | HEIGHT: 62 IN | HEART RATE: 95 BPM

## 2024-05-23 DIAGNOSIS — Z01.818 PRE-OPERATIVE EXAMINATION: Primary | ICD-10-CM

## 2024-05-23 LAB
ALBUMIN SERPL BCP-MCNC: 4.2 G/DL (ref 3.4–5)
ALP SERPL-CCNC: 94 U/L (ref 33–136)
ALT SERPL W P-5'-P-CCNC: 51 U/L (ref 7–45)
ANION GAP SERPL CALC-SCNC: 13 MMOL/L (ref 10–20)
APPEARANCE UR: CLEAR
AST SERPL W P-5'-P-CCNC: 43 U/L (ref 9–39)
BASOPHILS # BLD AUTO: 0.03 X10*3/UL (ref 0–0.1)
BASOPHILS NFR BLD AUTO: 0.5 %
BILIRUB SERPL-MCNC: 0.7 MG/DL (ref 0–1.2)
BILIRUB UR STRIP.AUTO-MCNC: NEGATIVE MG/DL
BUN SERPL-MCNC: 28 MG/DL (ref 6–23)
CALCIUM SERPL-MCNC: 9.3 MG/DL (ref 8.6–10.3)
CHLORIDE SERPL-SCNC: 103 MMOL/L (ref 98–107)
CO2 SERPL-SCNC: 28 MMOL/L (ref 21–32)
COLOR UR: NORMAL
CREAT SERPL-MCNC: 0.97 MG/DL (ref 0.5–1.05)
EGFRCR SERPLBLD CKD-EPI 2021: 65 ML/MIN/1.73M*2
EOSINOPHIL # BLD AUTO: 0.3 X10*3/UL (ref 0–0.7)
EOSINOPHIL NFR BLD AUTO: 5.1 %
ERYTHROCYTE [DISTWIDTH] IN BLOOD BY AUTOMATED COUNT: 13.6 % (ref 11.5–14.5)
GLUCOSE SERPL-MCNC: 125 MG/DL (ref 74–99)
GLUCOSE UR STRIP.AUTO-MCNC: NORMAL MG/DL
HCT VFR BLD AUTO: 36.2 % (ref 36–46)
HGB BLD-MCNC: 11.6 G/DL (ref 12–16)
IMM GRANULOCYTES # BLD AUTO: 0.02 X10*3/UL (ref 0–0.7)
IMM GRANULOCYTES NFR BLD AUTO: 0.3 % (ref 0–0.9)
KETONES UR STRIP.AUTO-MCNC: NEGATIVE MG/DL
LEUKOCYTE ESTERASE UR QL STRIP.AUTO: NEGATIVE
LYMPHOCYTES # BLD AUTO: 1.95 X10*3/UL (ref 1.2–4.8)
LYMPHOCYTES NFR BLD AUTO: 33.1 %
MCH RBC QN AUTO: 29.8 PG (ref 26–34)
MCHC RBC AUTO-ENTMCNC: 32 G/DL (ref 32–36)
MCV RBC AUTO: 93 FL (ref 80–100)
MONOCYTES # BLD AUTO: 0.42 X10*3/UL (ref 0.1–1)
MONOCYTES NFR BLD AUTO: 7.1 %
NEUTROPHILS # BLD AUTO: 3.17 X10*3/UL (ref 1.2–7.7)
NEUTROPHILS NFR BLD AUTO: 53.9 %
NITRITE UR QL STRIP.AUTO: NEGATIVE
NRBC BLD-RTO: 0 /100 WBCS (ref 0–0)
PH UR STRIP.AUTO: 6 [PH]
PLATELET # BLD AUTO: 184 X10*3/UL (ref 150–450)
POTASSIUM SERPL-SCNC: 4.6 MMOL/L (ref 3.5–5.3)
PROT SERPL-MCNC: 6.8 G/DL (ref 6.4–8.2)
PROT UR STRIP.AUTO-MCNC: NEGATIVE MG/DL
RBC # BLD AUTO: 3.89 X10*6/UL (ref 4–5.2)
RBC # UR STRIP.AUTO: NEGATIVE /UL
SODIUM SERPL-SCNC: 139 MMOL/L (ref 136–145)
SP GR UR STRIP.AUTO: 1.02
UROBILINOGEN UR STRIP.AUTO-MCNC: NORMAL MG/DL
WBC # BLD AUTO: 5.9 X10*3/UL (ref 4.4–11.3)

## 2024-05-23 PROCEDURE — 93005 ELECTROCARDIOGRAM TRACING: CPT

## 2024-05-23 PROCEDURE — 80053 COMPREHEN METABOLIC PANEL: CPT

## 2024-05-23 PROCEDURE — 99204 OFFICE O/P NEW MOD 45 MIN: CPT | Performed by: REGISTERED NURSE

## 2024-05-23 PROCEDURE — 85025 COMPLETE CBC W/AUTO DIFF WBC: CPT

## 2024-05-23 PROCEDURE — 36415 COLL VENOUS BLD VENIPUNCTURE: CPT

## 2024-05-23 PROCEDURE — 81003 URINALYSIS AUTO W/O SCOPE: CPT

## 2024-05-23 PROCEDURE — 87081 CULTURE SCREEN ONLY: CPT | Mod: GEALAB

## 2024-05-23 RX ORDER — CHLORHEXIDINE GLUCONATE ORAL RINSE 1.2 MG/ML
15 SOLUTION DENTAL DAILY
Qty: 120 ML | Refills: 0 | Status: SHIPPED | OUTPATIENT
Start: 2024-05-23 | End: 2024-05-25

## 2024-05-23 ASSESSMENT — DUKE ACTIVITY SCORE INDEX (DASI)
CAN YOU TAKE CARE OF YOURSELF (EAT, DRESS, BATHE, OR USE TOILET): YES
DASI METS SCORE: 4.1
CAN YOU WALK INDOORS, SUCH AS AROUND YOUR HOUSE: YES
TOTAL_SCORE: 10.7
CAN YOU DO HEAVY WORK AROUND THE HOUSE LIKE SCRUBBING FLOORS OR LIFTING AND MOVING HEAVY FURNITURE: NO
CAN YOU RUN A SHORT DISTANCE: NO
CAN YOU DO LIGHT WORK AROUND THE HOUSE LIKE DUSTING OR WASHING DISHES: YES
CAN YOU CLIMB A FLIGHT OF STAIRS OR WALK UP A HILL: NO
CAN YOU PARTICIPATE IN MODERATE RECREATIONAL ACTIVITIES LIKE GOLF, BOWLING, DANCING, DOUBLES TENNIS OR THROWING A BASEBALL OR FOOTBALL: NO
CAN YOU PARTICIPATE IN STRENOUS SPORTS LIKE SWIMMING, SINGLES TENNIS, FOOTBALL, BASKETBALL, OR SKIING: NO
CAN YOU WALK A BLOCK OR TWO ON LEVEL GROUND: NO
CAN YOU HAVE SEXUAL RELATIONS: NO
CAN YOU DO MODERATE WORK AROUND THE HOUSE LIKE VACUUMING, SWEEPING FLOORS OR CARRYING GROCERIES: YES
CAN YOU DO YARD WORK LIKE RAKING LEAVES, WEEDING OR PUSHING A MOWER: NO

## 2024-05-23 ASSESSMENT — ENCOUNTER SYMPTOMS
NECK NEGATIVE: 1
NEUROLOGICAL NEGATIVE: 1
GASTROINTESTINAL NEGATIVE: 1
RESPIRATORY NEGATIVE: 1
CARDIOVASCULAR NEGATIVE: 1
ARTHRALGIAS: 1
CONSTITUTIONAL NEGATIVE: 1

## 2024-05-23 ASSESSMENT — PAIN - FUNCTIONAL ASSESSMENT: PAIN_FUNCTIONAL_ASSESSMENT: 0-10

## 2024-05-23 ASSESSMENT — PAIN SCALES - GENERAL: PAINLEVEL_OUTOF10: 2

## 2024-05-23 NOTE — H&P (VIEW-ONLY)
CPM/PAT Evaluation       Name: Jojo Martin (Jojo Martin)  /Age: 1955/68 y.o.     In-Person       Chief Complaint: Evaluation prior to surgery    HPI  68 year old female scheduled for ARTHROPLASTY TOTAL HIP ANTERIOR APPROACH - Right on 6/10/24 with Dr. Corona secondary to Right hip pain. PMHx includes Anemia, HTN, Narcolepsy, mild Renal insufficiency, RLS, CKD Stage 3b, Thyroid Nodule, Reid OA Hips. Presents to CPM today for preoperative risk stratification and optimization.   Past Medical History:   Diagnosis Date    Acquired absence of both cervix and uterus     History of hysterectomy    Acute upper respiratory infection, unspecified 2019    Acute URI    Anemia     Arthritis     Chronic sinusitis, unspecified 2019    Sinobronchitis    CKD (chronic kidney disease) stage 3, GFR 30-59 ml/min (Multi)     Delayed emergence from general anesthesia     Goiter     pt denies    History of recent fall     24  no injury    Hypertension     Narcolepsy ([facility]HS-HCC)     Other specified postprocedural states 10/11/2018    Status post ORIF of fracture of ankle    Personal history of (healed) traumatic fracture 2018    History of fracture of left ankle    Personal history of diseases of the skin and subcutaneous tissue     History of erythematous condition    Personal history of malignant neoplasm of breast     History of malignant neoplasm of breast right    Personal history of other diseases of the respiratory system 11/15/2021    History of acute sinusitis    Personal history of other specified conditions 2021    History of diarrhea    Renal insufficiency     RLS (restless legs syndrome)     Rosacea     Sleep apnea     with CPAP    Unspecified nonsuppurative otitis media, unspecified ear 2019    Otitis media with effusion    Uses roller walker     Wears dentures     upper and lower full dentures    Wears glasses        Past Surgical History:   Procedure Laterality Date    BREAST  LUMPECTOMY Right     with 25 lymph nodes removed    CHOLECYSTECTOMY      HYSTERECTOMY  08/22/2016    Hysterectomy    KNEE ARTHROPLASTY Bilateral     MEDIPORT INSERTION, SINGLE Left     then removal for chemo    OTHER SURGICAL HISTORY  09/18/2018    Treatment Of The Left Ankle    OTHER SURGICAL HISTORY  09/18/2018    Oral Surgery    VEIN LIGATION AND STRIPPING Left        Patient  has no history on file for sexual activity.    Family History   Problem Relation Name Age of Onset    Cancer Mother      Stomach cancer Mother      Other (Urinary bladder cancer) Father      Throat cancer Father      Cancer Sister      Brain cancer Sister      Breast cancer Sister      Brain cancer Sibling         Allergies   Allergen Reactions    Anastrozole Other     Muscle/tissue break down    Ibuprofen Unknown     Motrin brand    Levocetirizine Unknown     Burning esophagus    Norethindrone Ac-Eth Estradiol Other     ALL BIRTH CONTROLS!!!!!Reports very high blood pressure with oral contraceptive    Paroxetine Hallucinations     Patient stated she can take the name brand, Paxil, but not the generic.. It is why/how she ended up in hospital    Tamoxifen Other     Suicidal        Prior to Admission medications    Medication Sig Start Date End Date Taking? Authorizing Provider   amLODIPine (Norvasc) 5 mg tablet Take 1 tablet (5 mg) by mouth once daily.    Historical Provider, MD   amoxicillin (Amoxil) 500 mg capsule Take 1 capsule (500 mg) by mouth once daily. 5/20/24   Ronel Greenfield,    amphetamine-dextroamphetamine (Adderall) 10 mg tablet Take by mouth.    Historical Provider, MD   amphetamine-dextroamphetamine (Adderall) 20 mg tablet Take 1 tablet (20 mg) by mouth 3 times a day.    Historical Provider, MD   ascorbic acid (Vitamin C) 500 mg tablet Take 2 tablets (1,000 mg) by mouth once daily.    Historical Provider, MD   aspirin 81 mg EC tablet Take 1 tablet (81 mg) by mouth once daily.    Historical Provider, MD   chlorhexidine  (Peridex) 0.12 % solution Use 15 mL in the mouth or throat once daily for 2 days. Use 15ml once the night before surgery and 15ml once the morning of surgery 5/23/24 5/25/24  Елена Gibson, APRN-CNP   cyclobenzaprine (Flexeril) 10 mg tablet Take 1 tablet (10 mg) by mouth once daily at bedtime.    Historical Provider, MD   diclofenac sodium (Voltaren) 1 % gel Apply 4.5 inches (4 g) topically 4 times a day. 3/5/24   David Corona MD   magnesium 250 mg tablet Take 1 tablet (250 mg) by mouth twice a day.    Historical Provider, MD   melatonin 3 mg capsule Take by mouth.    Historical Provider, MD   meloxicam (Mobic) 7.5 mg tablet Take 1 tablet (7.5 mg) by mouth once daily. 11/1/23 10/31/24  Ronel Greenfield DO   multivitamin capsule Take by mouth.    Historical Provider, MD   niacin 500 mg tablet Take by mouth.    Historical Provider, MD   PaxiL 20 mg tablet TAKE 2 TABLET BY MOUTH EVERY DAY AS DIRECTED 12/18/23   Historical Provider, MD   pitolisant (Wakix) 17.8 mg tablet Take 2 tablets by mouth once daily in the morning.    Historical Provider, MD   rOPINIRole (Requip) 2 mg tablet Take 1 tablet (2 mg) by mouth once daily at bedtime.    Historical Provider, MD   ZINC ORAL Take 1 tablet by mouth once daily.    Historical Provider, MD   amoxicillin (Amoxil) 500 mg capsule Take 1 capsule (500 mg) by mouth once daily. 2/29/24 5/17/24  Ronel Greenfield DO        PAT ROS:   Constitutional:   neg    Neuro/Psych:   neg    Eyes:    use of corrective lenses  Ears:   Nose:   Mouth:    dental issues  Throat:   neg    Neck:   neg    Cardio:   neg    Respiratory:   neg    Endocrine:   GI:   neg    :   neg    Musculoskeletal:    Right Hip pain 2/10   arthralgias  Hematologic:   neg    Skin:      Physical Exam  Vitals reviewed.   Constitutional:       Appearance: Normal appearance.   HENT:      Head: Normocephalic and atraumatic.      Nose: Nose normal.      Mouth/Throat:      Mouth: Mucous membranes are moist.       Pharynx: Oropharynx is clear.   Eyes:      Pupils: Pupils are equal, round, and reactive to light.   Neck:      Vascular: No carotid bruit.   Cardiovascular:      Rate and Rhythm: Normal rate and regular rhythm.      Pulses: Normal pulses.      Heart sounds: Normal heart sounds.   Pulmonary:      Effort: Pulmonary effort is normal.      Breath sounds: Normal breath sounds.   Abdominal:      Palpations: Abdomen is soft.   Musculoskeletal:         General: Tenderness present. Normal range of motion.      Cervical back: Normal range of motion and neck supple.      Comments: Right Hip   Skin:     General: Skin is warm and dry.      Capillary Refill: Capillary refill takes less than 2 seconds.   Neurological:      General: No focal deficit present.      Mental Status: She is alert and oriented to person, place, and time.   Psychiatric:         Mood and Affect: Mood normal.         Behavior: Behavior normal.         Thought Content: Thought content normal.         Judgment: Judgment normal.          PAT AIRWAY:   Airway:     Mallampati::  IV    TM distance::  >3 FB    Neck ROM::  Full   None      Visit Vitals  /83   Pulse 95   Temp 36.3 °C (97.3 °F)   Resp 18       DASI Risk Score      Flowsheet Row Most Recent Value   DASI SCORE 10.7   METS Score (Will be calculated only when all the questions are answered) 4.1          Caprini DVT Assessment      Flowsheet Row Most Recent Value   DVT Score 13   Current Status Major surgery planned, including arthroscopic and laproscopic (1-2 hours), Elective major lower extremity arthroplasty   History Prior major surgery   Age 60-75 years   BMI 41-50 (Morbid obesity)          Modified Frailty Index    No data to display       CHADS2 Stroke Risk  Current as of 16 minutes ago        N/A 3 to 100%: High Risk   2 to < 3%: Medium Risk   0 to < 2%: Low Risk     Last Change: N/A          This score determines the patient's risk of having a stroke if the patient has atrial fibrillation.         This score is not applicable to this patient. Components are not calculated.          Revised Cardiac Risk Index      Flowsheet Row Most Recent Value   Revised Cardiac Risk Calculator 0          Apfel Simplified Score    No data to display       Risk Analysis Index Results This Encounter    No data found in the last 1 encounters.       Stop Bang Score      Flowsheet Row Most Recent Value   Do you snore loudly? 1   Do you often feel tired or fatigued after your sleep? 0   Has anyone ever observed you stop breathing in your sleep? 1   Do you have or are you being treated for high blood pressure? 1   Recent BMI (Calculated) 41.5   Is BMI greater than 35 kg/m2? 1=Yes   Age older than 50 years old? 1=Yes   Is your neck circumference greater than 17 inches (Male) or 16 inches (Female)? 0   Gender - Male 0=No   STOP-BANG Total Score 5            Assessment and Plan:     Anesthesia:  The patient notes anesthesia complications in the past related to delayed emergence    Neuro:   The patient has diagnoses or significant findings on chart review or clinical presentation and evaluation significant for RLS, Narcolepsy on requip and wakix. The patient is at increased risk for perioperative stroke secondary to hypertension , increased age, female gender. Handouts for preoperative brain exercises given to patient.    HEENT/Airway  No diagnoses, significant findings on chart review, clinical presentation, or evaluation.    Cardiovascular    RCRI  The patient meets 0-1 RCRI criteria and therefore has a less than 1% risk of major adverse cardiac complications.  METS  The patient's functional capacity capacity is greater than 4 METS.  EKG  5/23/24  Normal Sinus Rhythm  Possible Anterior Infarct, age undetermined  Abnormal ECG  Rate 98    Hypertension Evaluation  The patient has a known history of hypertension that is controlled on norvasc  Cardiology Evaluation  The patient is not followed by cardiology.    SANTIAGO score which  indicates a 0.6% risk of intraoperative or 30-day postoperative.    Pulmonary   No significant findings on chart review or clinical presentation and evaluation.    The patient has a stop bang score of 5, which places patient at high risk for having MAJOR.    ARISCAT 3, low, 1.6% risk of in-hospital postoperative pulmonary complications  PRODIGY 13, intermediate risk of respiratory depression episode. Patient given PI sheet for preoperative deep breathing exercises.    Hematology  The patient has diagnoses or significant findings on chart review or clinical presentation and evaluation significant for Anemia  Antiplatelet management   The patient is currently receiving antiplatelet therapy for primary prevention of cardiovascular disease.  Anticoagulation management  The patient is not currently receiving anticoagulation therapy. Patient provided with DVT educational handout.      Caprini score 13, high risk of perioperative VTE.     Patient instructed to ambulate as soon as possible postoperatively to decrease thromboembolic risk. Initiate mechanical DVT prophylaxis as soon as possible and initiate chemical prophylaxis when deemed safe from a bleeding standpoint post surgery.       Gastrointestinal  No diagnoses or significant findings on chart review or clinical presentation and evaluation.  Eat 10- 0,  self-perceived oropharyngeal dysphagia scale (0-40)     Genitourinary  No diagnoses or significant findings on chart review or clinical presentation and evaluation.    Renal  The patient has a history of chronic kidney disease most consistent with stage CKD Stage 3b, mild renal insufficiency.      Musculoskeletal  The patient has diagnoses or significant findings on chart review or clinical presentation and evaluation significant for Bilateral OA Hips.  3/5/24 XR Hip Right  FINDINGS:  No acute fracture or malalignment.  Severe right hip osteoarthrosis with joint space loss and subchondral  sclerosis with subchondral  cyst formation of the femoral head. Mild  left hip osteoarthrosis. Lower lumbar spine facet joint arthropathy.  Pubic symphysis degenerative changes. Soft tissues are within normal  limits.      IMPRESSION:  1. Severe right and mild left hip osteoarthrosis.      Endocrine  Diabetes Evaluation  The patient has no history of diabetes mellitus.  Thyroid Disease Evaluation  The patient has a history of thyroid nodule.     ID  MRSA screening obtained. Instructions given for Hibiclens and Peridex. Prescription given for Peridex.    -Preoperative medication instructions were provided and reviewed with the patient.  Any additional testing or evaluation was explained to the patient.  NPO Instructions were discussed, and the patient's questions were answered prior to conclusion of this encounter.

## 2024-05-23 NOTE — PREPROCEDURE INSTRUCTIONS
Medication List            Accurate as of May 23, 2024  1:28 PM. Always use your most recent med list.                amLODIPine 5 mg tablet  Commonly known as: Norvasc  Medication Adjustments for Surgery: Take morning of surgery with sip of water, no other fluids     amoxicillin 500 mg capsule  Commonly known as: Amoxil  Take 1 capsule (500 mg) by mouth once daily.  Medication Adjustments for Surgery: Take morning of surgery with sip of water, no other fluids     * amphetamine-dextroamphetamine 20 mg tablet  Commonly known as: Adderall  Medication Adjustments for Surgery: Other (Comment)  Notes to patient: Do not take day of surgery     * amphetamine-dextroamphetamine 10 mg tablet  Commonly known as: Adderall  Medication Adjustments for Surgery: Other (Comment)  Notes to patient: Do not take day of surgery     ascorbic acid 500 mg tablet  Commonly known as: Vitamin C  Medication Adjustments for Surgery: Stop 7 days before surgery     aspirin 81 mg EC tablet  Medication Adjustments for Surgery: Stop 7 days before surgery     chlorhexidine 0.12 % solution  Commonly known as: Peridex  Use 15 mL in the mouth or throat once daily for 2 days. Use 15ml once the night before surgery and 15ml once the morning of surgery  Medication Adjustments for Surgery: Take morning of surgery with sip of water, no other fluids     diclofenac sodium 1 % gel  Commonly known as: Voltaren  Apply 4.5 inches (4 g) topically 4 times a day.  Medication Adjustments for Surgery: Stop 7 days before surgery     magnesium 250 mg tablet  Medication Adjustments for Surgery: Stop 7 days before surgery     melatonin 3 mg capsule  Medication Adjustments for Surgery: Stop 1 day before surgery     meloxicam 7.5 mg tablet  Commonly known as: Mobic  Take 1 tablet (7.5 mg) by mouth once daily.  Medication Adjustments for Surgery: Stop 7 days before surgery     multivitamin capsule  Medication Adjustments for Surgery: Stop 7 days before surgery     niacin  500 mg tablet  Medication Adjustments for Surgery: Stop 7 days before surgery     PaxiL 20 mg tablet  Generic drug: PARoxetine  Medication Adjustments for Surgery: Take morning of surgery with sip of water, no other fluids     rOPINIRole 2 mg tablet  Commonly known as: Requip  Medication Adjustments for Surgery: Take morning of surgery with sip of water, no other fluids     Wakix 17.8 mg tablet  Generic drug: pitolisant  Medication Adjustments for Surgery: Other (Comment)  Notes to patient: Do not take day of surgery     ZINC ORAL  Medication Adjustments for Surgery: Stop 7 days before surgery           * This list has 2 medication(s) that are the same as other medications prescribed for you. Read the directions carefully, and ask your doctor or other care provider to review them with you.                SURGERY PRE-OPERATIVE INSTRUCTIONS    *You will receive a phone call the day before your procedure  after 2pm, (or the Friday before your surgery if scheduled on a Monday.) Generally the hospital will be calling you with this information after that time.    *You are not to eat after midnight the night before the surgery. You may have 8oz of a clear liquid up until 2 hours prior to arriving to the hospital. The exception is with medications you were instructed to take day of surgery.    *You may take tylenol for pain/discomfort as needed.     *Stop taking all aspirin products, ibuprofen (motrin/advil), naproxen (aleve/naprosyn) for one week prior to surgery.    *Stop taking all vitamins and supplements one week prior to surgery.     *You should not have alcoholic beverages for 24 hours before surgery.     *You should not smoke 24 hours prior to surgery.     *To help prevent surgical infections bathe/shower with Dial soap the evening before surgery.    *You can wear deodorant but no lotion, powder, or perfume/cologne. You should remove all make-up and nail polish at home.    *If you wear glasses, please bring a case  for the glasses with you.    *You will be asked to remove dentures and contacts.     *Please leave all valuables at home.    *You should wear loose, comfortable clothing that will accommodate bandages and/or casts.    *You should notify your doctor of any change in your condition (fever, cold, rash, etc). Surgery may need to be re-scheduled until a time you are in better health.    *A responsible adult is required to accompany you to and from the hospital if you are receiving anesthesia or a sedative. Patients are not permitted to drive for 24 hours after anesthesia.     *You can use the [employer] if you wish.     *If you have any further questions please call [phone].    CHG BODY WASH INSTRUCTIONS    *Begin using your CHG soap five days prior to your scheduled surgery.  Allow the CHG soap to sit on skin for 3 minutes. Do not wash with regular soap after you have used the CHG soap. Pat yourself dry with a clean, fresh towel.    *Wash your face with normal soap and water. Apply the CHG solution to a clean, wet washcloth. Firmly lather your entire body from the neck down. Do not use on your face.     *Do not apply powders, deodorants, or lotions after using CHG wash.    *Dress in clean, freshly laundered night clothes.    *Be sure to sleep with clean, freshly laundered sheets.     *Be aware CHG wash may cause stains on fabrics. Rinse your washcloth and other linens that come in contact with CHG completely. Use non-chlorine detergents to launder items used.     *The morning of surgery is the fifth day, repeat the CHG wash and wear fresh laundered clothes.     *If you have any questions about the CHG soap, call [phone].       MOUTHRINSE INSTRUCTIONS    *CHG oral rinse is used to kill a bacteria in the mouth known as Staphylococcus aureus. This reduces the risks of surgical site infections.     *Using dental rinse: use the CHG oral rinse after you brush your teeth the night before and the morning of the  surgery. Follow all directions on your prescription label.     *Use 1 capful (15ml), swish and gargle for at least 30 seconds. Do not swallow. Spit rinse out.     *Do not rinse mouth with water, eat or drink after using CHG mouth rinse.     *Possible side effects: CHG rinse will stick to plaque on teeth. Brush and floss just before use. Teeth brushing will help to avoid staining of plaque during use.    *Any questions, please call [phone].                      NPO Instructions:        Additional Instructions:

## 2024-05-24 LAB — HOLD SPECIMEN: NORMAL

## 2024-05-25 LAB — STAPHYLOCOCCUS SPEC CULT: ABNORMAL

## 2024-06-04 ENCOUNTER — HOSPITAL ENCOUNTER (OUTPATIENT)
Dept: RADIOLOGY | Facility: HOSPITAL | Age: 69
Discharge: HOME | End: 2024-06-04
Payer: MEDICARE

## 2024-06-04 ENCOUNTER — OFFICE VISIT (OUTPATIENT)
Dept: ORTHOPEDIC SURGERY | Facility: CLINIC | Age: 69
End: 2024-06-04
Payer: MEDICARE

## 2024-06-04 ENCOUNTER — ANESTHESIA EVENT (OUTPATIENT)
Dept: OPERATING ROOM | Facility: HOSPITAL | Age: 69
End: 2024-06-04
Payer: MEDICARE

## 2024-06-04 DIAGNOSIS — M25.551 RIGHT HIP PAIN: Primary | ICD-10-CM

## 2024-06-04 DIAGNOSIS — M25.551 RIGHT HIP PAIN: ICD-10-CM

## 2024-06-04 PROCEDURE — 1157F ADVNC CARE PLAN IN RCRD: CPT | Performed by: ORTHOPAEDIC SURGERY

## 2024-06-04 PROCEDURE — 73502 X-RAY EXAM HIP UNI 2-3 VIEWS: CPT | Mod: RIGHT SIDE | Performed by: RADIOLOGY

## 2024-06-04 PROCEDURE — 1036F TOBACCO NON-USER: CPT | Performed by: ORTHOPAEDIC SURGERY

## 2024-06-04 PROCEDURE — 1159F MED LIST DOCD IN RCRD: CPT | Performed by: ORTHOPAEDIC SURGERY

## 2024-06-04 PROCEDURE — 99213 OFFICE O/P EST LOW 20 MIN: CPT | Performed by: ORTHOPAEDIC SURGERY

## 2024-06-04 PROCEDURE — 72100 X-RAY EXAM L-S SPINE 2/3 VWS: CPT

## 2024-06-04 PROCEDURE — 72100 X-RAY EXAM L-S SPINE 2/3 VWS: CPT | Performed by: RADIOLOGY

## 2024-06-04 PROCEDURE — 1160F RVW MEDS BY RX/DR IN RCRD: CPT | Performed by: ORTHOPAEDIC SURGERY

## 2024-06-04 PROCEDURE — 1125F AMNT PAIN NOTED PAIN PRSNT: CPT | Performed by: ORTHOPAEDIC SURGERY

## 2024-06-04 PROCEDURE — 73502 X-RAY EXAM HIP UNI 2-3 VIEWS: CPT | Mod: RT

## 2024-06-04 ASSESSMENT — PAIN SCALES - GENERAL: PAINLEVEL_OUTOF10: 8

## 2024-06-04 ASSESSMENT — PAIN - FUNCTIONAL ASSESSMENT: PAIN_FUNCTIONAL_ASSESSMENT: 0-10

## 2024-06-04 NOTE — PROGRESS NOTES
Chief Complaint   Patient presents with    Right Hip - Follow-up     Pre Op Right Hip         This is a 68 y.o. year old female who presents for preoperative visit for her right total hip.  Patient has severe degenerative disease of the affected joint. The patient complains of severe pain in the area, the pain is gradually getting worse. She has failed extensive nonsurgical treatment including anti-inflammatories physical therapy use of assistive devices activity modification cortisone injections. Despite this interventions the patient is worsening pain which impacts her quality of life and activities of daily living and they would like to proceed with joint replacement.    Physical Exam    There has been no interval change in this patient's past medical, surgical, medications, allergies, family history or social history since the most recent visit to a provider within our department. 14 point review of systems was performed, reviewed, and negative except for pertinent positives documented in the history of present illness.     Constitutional: well developed, well nourished female in no acute distress  Psychiatric: normal mood, appropriate affect  Eyes: sclera anicteric  HENT: normocephalic/atraumatic  CV: regular rate and rhythm   Respiratory: non labored breathing  Integumentary: no rash  Neurological: moves all extremities    Pre-Admission Testing on 05/23/2024   Component Date Value Ref Range Status    WBC 05/23/2024 5.9  4.4 - 11.3 x10*3/uL Final    nRBC 05/23/2024 0.0  0.0 - 0.0 /100 WBCs Final    RBC 05/23/2024 3.89 (L)  4.00 - 5.20 x10*6/uL Final    Hemoglobin 05/23/2024 11.6 (L)  12.0 - 16.0 g/dL Final    Hematocrit 05/23/2024 36.2  36.0 - 46.0 % Final    MCV 05/23/2024 93  80 - 100 fL Final    MCH 05/23/2024 29.8  26.0 - 34.0 pg Final    MCHC 05/23/2024 32.0  32.0 - 36.0 g/dL Final    RDW 05/23/2024 13.6  11.5 - 14.5 % Final    Platelets 05/23/2024 184  150 - 450 x10*3/uL Final    Neutrophils % 05/23/2024  53.9  40.0 - 80.0 % Final    Immature Granulocytes %, Automated 05/23/2024 0.3  0.0 - 0.9 % Final    Immature Granulocyte Count (IG) includes promyelocytes, myelocytes and metamyelocytes but does not include bands. Percent differential counts (%) should be interpreted in the context of the absolute cell counts (cells/UL).    Lymphocytes % 05/23/2024 33.1  13.0 - 44.0 % Final    Monocytes % 05/23/2024 7.1  2.0 - 10.0 % Final    Eosinophils % 05/23/2024 5.1  0.0 - 6.0 % Final    Basophils % 05/23/2024 0.5  0.0 - 2.0 % Final    Neutrophils Absolute 05/23/2024 3.17  1.20 - 7.70 x10*3/uL Final    Percent differential counts (%) should be interpreted in the context of the absolute cell counts (cells/uL).    Immature Granulocytes Absolute, Au* 05/23/2024 0.02  0.00 - 0.70 x10*3/uL Final    Lymphocytes Absolute 05/23/2024 1.95  1.20 - 4.80 x10*3/uL Final    Monocytes Absolute 05/23/2024 0.42  0.10 - 1.00 x10*3/uL Final    Eosinophils Absolute 05/23/2024 0.30  0.00 - 0.70 x10*3/uL Final    Basophils Absolute 05/23/2024 0.03  0.00 - 0.10 x10*3/uL Final    Glucose 05/23/2024 125 (H)  74 - 99 mg/dL Final    Sodium 05/23/2024 139  136 - 145 mmol/L Final    Potassium 05/23/2024 4.6  3.5 - 5.3 mmol/L Final    Chloride 05/23/2024 103  98 - 107 mmol/L Final    Bicarbonate 05/23/2024 28  21 - 32 mmol/L Final    Anion Gap 05/23/2024 13  10 - 20 mmol/L Final    Urea Nitrogen 05/23/2024 28 (H)  6 - 23 mg/dL Final    Creatinine 05/23/2024 0.97  0.50 - 1.05 mg/dL Final    eGFR 05/23/2024 65  >60 mL/min/1.73m*2 Final    Calculations of estimated GFR are performed using the 2021 CKD-EPI Study Refit equation without the race variable for the IDMS-Traceable creatinine methods.  https://jasn.asnjournals.org/content/early/2021/09/22/ASN.5965693866    Calcium 05/23/2024 9.3  8.6 - 10.3 mg/dL Final    Albumin 05/23/2024 4.2  3.4 - 5.0 g/dL Final    Alkaline Phosphatase 05/23/2024 94  33 - 136 U/L Final    Total Protein 05/23/2024 6.8  6.4 - 8.2  g/dL Final    AST 05/23/2024 43 (H)  9 - 39 U/L Final    Bilirubin, Total 05/23/2024 0.7  0.0 - 1.2 mg/dL Final    ALT 05/23/2024 51 (H)  7 - 45 U/L Final    Patients treated with Sulfasalazine may generate falsely decreased results for ALT.    Staph/MRSA Screen Culture 05/23/2024 Isolated: Methicillin Susceptible Staphylococcus aureus (MSSA) (A)   Final    Color, Urine 05/23/2024 Light-Yellow  Light-Yellow, Yellow, Dark-Yellow Final    Appearance, Urine 05/23/2024 Clear  Clear Final    Specific Gravity, Urine 05/23/2024 1.018  1.005 - 1.035 Final    pH, Urine 05/23/2024 6.0  5.0, 5.5, 6.0, 6.5, 7.0, 7.5, 8.0 Final    Protein, Urine 05/23/2024 NEGATIVE  NEGATIVE, 10 (TRACE), 20 (TRACE) mg/dL Final    Glucose, Urine 05/23/2024 Normal  Normal mg/dL Final    Blood, Urine 05/23/2024 NEGATIVE  NEGATIVE Final    Ketones, Urine 05/23/2024 NEGATIVE  NEGATIVE mg/dL Final    Bilirubin, Urine 05/23/2024 NEGATIVE  NEGATIVE Final    Urobilinogen, Urine 05/23/2024 Normal  Normal mg/dL Final    Nitrite, Urine 05/23/2024 NEGATIVE  NEGATIVE Final    Leukocyte Esterase, Urine 05/23/2024 NEGATIVE  NEGATIVE Final    Extra Tube 05/23/2024 Hold for add-ons.   Final    Auto resulted.         Right hip exam: skin normal, no abrasions, wounds, or lacerations. nttp over greater trochanter. negative log roll, negative landon's test. flexion to 90 degrees without pain. no pain with flexion abduction and external rotation, severe hip and groin pain with flexion adduction and internal rotation. neurovascularly intact distally        Preoperative labs reviewed, no findings which would preclude surgery    Impression plan: This is a 68 y.o. yo femalewith severe end-stage degenerative disease of the right that has failed nonoperative management.  Once again I discussed with the patient in detail the risks benefits and alternatives of total joint replacement. For the full details of that discussion see my previous note. The patient has obtained  "appropriate medical and dental clearance, and her labs have been reviewed. We will plan to proceed with surgery.    BMI Readings from Last 1 Encounters:   05/23/24 43.51 kg/m²     Lab Results   Component Value Date    CREATININE 0.97 05/23/2024     Tobacco Use: Low Risk  (6/4/2024)    Patient History     Smoking Tobacco Use: Never     Smokeless Tobacco Use: Never     Passive Exposure: Not on file      Computed MELD 3.0 unavailable. One or more values for this score either were not found within the given timeframe or did not fit some other criterion.  Computed MELD-Na unavailable. One or more values for this score either were not found within the given timeframe or did not fit some other criterion.       No results found for: \"HGBA1C\"  Lab Results   Component Value Date    STAPHMRSASCR (A) 05/23/2024     Isolated: Methicillin Susceptible Staphylococcus aureus (MSSA)     "

## 2024-06-06 RX ORDER — POLYETHYLENE GLYCOL 3350 17 G/17G
17 POWDER, FOR SOLUTION ORAL DAILY
Qty: 238 G | Refills: 0 | Status: SHIPPED | OUTPATIENT
Start: 2024-06-06

## 2024-06-06 RX ORDER — NAPROXEN SODIUM 220 MG/1
81 TABLET, FILM COATED ORAL 2 TIMES DAILY
Qty: 56 TABLET | Refills: 0 | Status: SHIPPED | OUTPATIENT
Start: 2024-06-06 | End: 2024-07-04

## 2024-06-06 RX ORDER — DOCUSATE SODIUM 100 MG/1
100 CAPSULE, LIQUID FILLED ORAL 2 TIMES DAILY
Qty: 28 CAPSULE | Refills: 0 | Status: SHIPPED | OUTPATIENT
Start: 2024-06-06 | End: 2024-06-20

## 2024-06-06 RX ORDER — OXYCODONE AND ACETAMINOPHEN 5; 325 MG/1; MG/1
1 TABLET ORAL EVERY 4 HOURS PRN
Qty: 36 TABLET | Refills: 0 | Status: SHIPPED | OUTPATIENT
Start: 2024-06-06 | End: 2024-06-12

## 2024-06-06 RX ORDER — CEFADROXIL 500 MG/1
500 CAPSULE ORAL 2 TIMES DAILY
Qty: 14 CAPSULE | Refills: 0 | Status: SHIPPED | OUTPATIENT
Start: 2024-06-06 | End: 2024-06-13

## 2024-06-06 NOTE — DISCHARGE INSTRUCTIONS
TOTAL HIP AND KNEE REPLACEMENT DISCHARGE INSTRUCTIONS  David Corona MD      DRIVING & TRAVEL AFTER SURGERY   Patients should anticipate waiting at least 4-6 weeks before traveling long distances after surgery.  You will need to stop to walk around ever 1 hour during your travel to help with blood clot prevention.  Please call the office or your joint nurse to discuss prior to post-surgical travel.  Patients may not drive until cleared by the joint nurse or the office.    DENTAL PROCEDURES & CLEANINGS  You must wait a minimum of 3 months for elective dental appointments, including routine cleanings or dental work including bridges, crowns, extractions, etc..  For any dental visit - cleaning or dental procedures - patients must take an antibiotic 1 hour before the appointment.  Antibiotics are a lifelong need before dental appointments.  The antibiotic prescribed will be based on each patient's allergies.    WOUND CARE  If you experience continued drainage or bleeding, you may cover with abdominal pads (purchase at local drug store).  Knee replacements should wrap with an ace wrap.  Do not remove surgical dressing, it will be removed when you arrive in clinic for follow up visit.   Mesh dressing under the bandage will remain in place until it peels off on its own.  If it is loose, you may gently remove.  Do not remove if pulling causes resistance against the incision.      PAIN, SWELLING, BRUISING & CLICKING  Pain and swelling are a natural part of your recovery which is considered normal fur up to a year after surgery.  Symptoms may be treated with movement, ice, compression stockings, elevating your leg, and by following the pain medication regimen as prescribed.  Bruising is normal for several weeks after surgery and will run down the leg over time.  You may ice areas that are tender to help with discomfort.  You are required to wear the provided compression stockings, every day, for 4 weeks following surgery.   Remove the stockings at night and place them back on in the morning.  Pain and swelling may temporarily increase with an increase in activity or exercise.  Use ice after activity.  Audible clicking with movement or exercises is considered normal following joint replacement.  You may also feel decreased sensation or numbness near the incision site.  These are usually normal and may or may not fade over time.     PERSONAL HYGIENE  You may shower upon discharging from the hospital.  Soap and water is permitted to run over the surgical dressing, steri-strips and incision.  Do not scrub directly over these items.  DO NOT soak your incision in a bath, hot tub, pool or pond/lake for a minimum of 8 weeks following your surgery.  DO NOT use lotions, creams, ointments on your wound for a minimum of 6 weeks following your surgery. At that time you may use vitamin E to assist with softening of your incision.      RESTARTING HOME ROUTINE - DIET & MEDICATIONS  Post-operative constipation can result due to a combination of inactivity, anesthesia and pain medication. To help prevent this, you should increase your water and fiber intake. Physical activity such as walking will also help stimulate the bowels.   You may resume your normal diet when you discharge home.  Choose foods that help promote good bowel habits and prevent constipation, such as foods high in fiber.  You may restart your home medications the following day after your surgery UNLESS you have been given alternate instructions.  Follow the instructions given to you on your hospital discharge instructions for more information regarding your home medications.      IN-HOME PHYSICAL THERAPY & OUTPATIENT PHYSICAL THERAPY  In-home physical therapy will start 1-2 days after you get home from the hospital.    The home care agency will call within the first 24-48 hours to set up their first visit.  Please do not call your care team to inquire during this timeframe.  Continue  the exercises you were given in the hospital until you have been seen by in-home therapy.  Make sure to provide a phone number with the ability for the home care staff to leave a message if you do not answer your phone.    Outpatient physical therapy following knee replacement surgery should begin 2-3 weeks after surgery.  You should call to schedule this appointment ASAP if not already scheduled before surgery.  Waiting until you are ready for outpatient physical therapy will cause a delay in your care.  You may choose any outpatient physical therapy location.  Call the office for an order if needed.    EMERGENCIES - WHEN TO CONTACT THE SURGEON'S OFFICE IMMEDIATELY  Fever >101 with chills that has been present for at least 48 hours.   Excessive bleeding from incision that will not slow down. A small amount of drainage is normal and expected.  Once pressure is applied and the area is covered, do not continue to check the area regularly.  This will remove pressure and bleeding will continue.  Leave in place for 4-6 hours.  Signs of infection of incision-excessive drainage that is soaking through your dressing (especially if it is pus-like), redness that is spreading out from the edges of your incision, or increased warmth around the area.  Excruciating pain for which the pain medication, taken as instructed, is not helping.  Severe calf pain.  Go directly to the emergency room or call 911, if you are experiencing chest pain or difficulty breathing.    ICE & COLD THERAPY INSTRUCTIONS    To assist with pain control and post-op swelling, you should be using ice regularly throughout recovery, especially for the first 6 weeks, regardless of the cold therapy method you use.      Always make sure there is a layer of protection between the cold pad and your skin.    If you are using ICE PACKS or GEL PACKS, you will need to alternate 20 minutes on, 20 minutes off twice per hour.    If you are using an ICE MACHINE, please  follow the provided ice machine instructions.  These devices differ from ice or ice packs whereas the mechanism circulates water through tubing and a pad to provide longer periods of cold therapy to the desired site.  You can use your cold devices around the clock for optimal comfort.  We recommend using cold therapy after working with therapy or completing exercises on your own.  There is no set schedule in which you must follow while using cold therapy.  Below are a few points to remember when using a cold therapy device:    You do not need to need to use the 20 on, 20 off method.  Detach the pad from the cooler and ambulate at least once every hour.  You can check your skin under the pad at this time.  You may wear the cold therapy device during periods of sleep including overnight.  If you wake up during the night, you can check the skin at this time.  You do not need to wake up specifically to perform skin checks.  Empty the cooler and pad when device is not in use.  Follow 's instructions for cleaning your cold therapy device.      DISCHARGE MEDICATIONS    PAIN MEDICATION    __X__ Oxycodone-acetaminophen  Oxycodone-acetaminophen has been prescribed for post-operative pain control.    These medications may only be refilled if neededONCE every 7 days for a period of up to 6 weeks following surgery.  After 6 weeks, you will transition to acetaminophen and over -the- counter anti-inflammatories such as Ibuprofen, Advil or Aleve in conjunction with ICE/COLD THERAPY.   Side effects may be constipation and nausea, vomiting, sleepiness, dizziness, lightheadedness, headache, blurred vision, dry mouth sweating, itching (if you have itching, over-the -counter Benadryl can be used as needed).  You may NOT operate a motor vehicle while taking these medications or have been cleared by your care team.    Please call the office for a refill request.  The office staff and orthopedic nurses cannot refill  medications; messages should be left directly through the office.  Please do not call multiple times or call other members of the care team for medication needs, this will cause the refill to take longer.    Per State and Institutional policy, pain medications can only be refilled every 7 days for up to six weeks following surgery.   .    ___X____ Naproxen has been prescribed as an adjunct anti-inflammatory to assist in pain control.    Take one 500 mg tablet twice a day for 4 weeks.  You will not receive refills on this medication.   Side effects may include nausea.  May not be prescribed if you are on a more potent blood thinner than aspirin or have chronic kidney disease.    BLOOD THINNER    __X__ Blood Thinner   A blood thinner has been prescribed to prevent blood clots in your leg or lungs. Take as prescribed on the bottle for 4 weeks. You will not receive a refill on this medication.      STOOL SOFTENERS    __X__ Colace (Docusate Sodium)   Take this medication to help with constipation while using the Oxycodone for pain control.  You will not receive a refill on this medication.    Antibiotics    __X__ Cefadroxil or doxycycline  May be prescribed if needed for prophylaxis against infection   Take 7 day course of antibiotics until they are all gone  May not be prescribed if you do not meet criteria for elevated infection risk after surgery          You will not receive refills on the following medications:    Naproxen  Colace  Blood Thinner

## 2024-06-07 ENCOUNTER — HOME HEALTH ADMISSION (OUTPATIENT)
Dept: HOME HEALTH SERVICES | Facility: HOME HEALTH | Age: 69
End: 2024-06-07
Payer: MEDICARE

## 2024-06-07 ENCOUNTER — TELEPHONE (OUTPATIENT)
Dept: INPATIENT UNIT | Facility: HOSPITAL | Age: 69
End: 2024-06-07
Payer: MEDICARE

## 2024-06-10 ENCOUNTER — APPOINTMENT (OUTPATIENT)
Dept: RADIOLOGY | Facility: HOSPITAL | Age: 69
End: 2024-06-10
Payer: MEDICARE

## 2024-06-10 ENCOUNTER — DOCUMENTATION (OUTPATIENT)
Dept: HOME HEALTH SERVICES | Facility: HOME HEALTH | Age: 69
End: 2024-06-10
Payer: MEDICARE

## 2024-06-10 ENCOUNTER — HOSPITAL ENCOUNTER (OUTPATIENT)
Facility: HOSPITAL | Age: 69
Discharge: NURSING, REHAB OR LONG-TERM CARE | End: 2024-06-12
Attending: ORTHOPAEDIC SURGERY | Admitting: ORTHOPAEDIC SURGERY
Payer: MEDICARE

## 2024-06-10 ENCOUNTER — ANESTHESIA (OUTPATIENT)
Dept: OPERATING ROOM | Facility: HOSPITAL | Age: 69
End: 2024-06-10
Payer: MEDICARE

## 2024-06-10 DIAGNOSIS — M16.11 PRIMARY OSTEOARTHRITIS OF RIGHT HIP: ICD-10-CM

## 2024-06-10 DIAGNOSIS — M25.551 RIGHT HIP PAIN: Primary | ICD-10-CM

## 2024-06-10 DIAGNOSIS — M16.11 OSTEOARTHRITIS OF RIGHT HIP, UNSPECIFIED OSTEOARTHRITIS TYPE: ICD-10-CM

## 2024-06-10 PROCEDURE — 3700000001 HC GENERAL ANESTHESIA TIME - INITIAL BASE CHARGE: Performed by: ORTHOPAEDIC SURGERY

## 2024-06-10 PROCEDURE — 97161 PT EVAL LOW COMPLEX 20 MIN: CPT | Mod: GP

## 2024-06-10 PROCEDURE — 9420000001 HC RT PATIENT EDUCATION 5 MIN

## 2024-06-10 PROCEDURE — 94760 N-INVAS EAR/PLS OXIMETRY 1: CPT | Mod: 59

## 2024-06-10 PROCEDURE — 72170 X-RAY EXAM OF PELVIS: CPT | Performed by: RADIOLOGY

## 2024-06-10 PROCEDURE — 2500000005 HC RX 250 GENERAL PHARMACY W/O HCPCS: Performed by: NURSE PRACTITIONER

## 2024-06-10 PROCEDURE — 2500000004 HC RX 250 GENERAL PHARMACY W/ HCPCS (ALT 636 FOR OP/ED): Performed by: NURSE PRACTITIONER

## 2024-06-10 PROCEDURE — 99222 1ST HOSP IP/OBS MODERATE 55: CPT | Performed by: INTERNAL MEDICINE

## 2024-06-10 PROCEDURE — 2500000004 HC RX 250 GENERAL PHARMACY W/ HCPCS (ALT 636 FOR OP/ED): Performed by: NURSE ANESTHETIST, CERTIFIED REGISTERED

## 2024-06-10 PROCEDURE — 2500000001 HC RX 250 WO HCPCS SELF ADMINISTERED DRUGS (ALT 637 FOR MEDICARE OP): Performed by: NURSE PRACTITIONER

## 2024-06-10 PROCEDURE — 2500000004 HC RX 250 GENERAL PHARMACY W/ HCPCS (ALT 636 FOR OP/ED): Performed by: ANESTHESIOLOGY

## 2024-06-10 PROCEDURE — 7100000002 HC RECOVERY ROOM TIME - EACH INCREMENTAL 1 MINUTE: Performed by: ORTHOPAEDIC SURGERY

## 2024-06-10 PROCEDURE — 7100000001 HC RECOVERY ROOM TIME - INITIAL BASE CHARGE: Performed by: ORTHOPAEDIC SURGERY

## 2024-06-10 PROCEDURE — 3600000018 HC OR TIME - INITIAL BASE CHARGE - PROCEDURE LEVEL SIX: Performed by: ORTHOPAEDIC SURGERY

## 2024-06-10 PROCEDURE — 2780000003 HC OR 278 NO HCPCS: Performed by: ORTHOPAEDIC SURGERY

## 2024-06-10 PROCEDURE — 76000 FLUOROSCOPY <1 HR PHYS/QHP: CPT

## 2024-06-10 PROCEDURE — 3600000017 HC OR TIME - EACH INCREMENTAL 1 MINUTE - PROCEDURE LEVEL SIX: Performed by: ORTHOPAEDIC SURGERY

## 2024-06-10 PROCEDURE — 72170 X-RAY EXAM OF PELVIS: CPT

## 2024-06-10 PROCEDURE — 7100000011 HC EXTENDED STAY RECOVERY HOURLY - NURSING UNIT

## 2024-06-10 PROCEDURE — 2500000005 HC RX 250 GENERAL PHARMACY W/O HCPCS: Performed by: NURSE ANESTHETIST, CERTIFIED REGISTERED

## 2024-06-10 PROCEDURE — 2720000007 HC OR 272 NO HCPCS: Mod: MUE | Performed by: ORTHOPAEDIC SURGERY

## 2024-06-10 PROCEDURE — A9999 DME SUPPLY OR ACCESSORY, NOS: HCPCS | Mod: MUE | Performed by: ORTHOPAEDIC SURGERY

## 2024-06-10 PROCEDURE — 27130 TOTAL HIP ARTHROPLASTY: CPT | Performed by: ORTHOPAEDIC SURGERY

## 2024-06-10 PROCEDURE — C1776 JOINT DEVICE (IMPLANTABLE): HCPCS | Performed by: ORTHOPAEDIC SURGERY

## 2024-06-10 PROCEDURE — 97110 THERAPEUTIC EXERCISES: CPT | Mod: GP

## 2024-06-10 PROCEDURE — 2500000004 HC RX 250 GENERAL PHARMACY W/ HCPCS (ALT 636 FOR OP/ED): Performed by: ORTHOPAEDIC SURGERY

## 2024-06-10 PROCEDURE — 2500000005 HC RX 250 GENERAL PHARMACY W/O HCPCS: Performed by: ANESTHESIOLOGY

## 2024-06-10 PROCEDURE — 2500000001 HC RX 250 WO HCPCS SELF ADMINISTERED DRUGS (ALT 637 FOR MEDICARE OP): Performed by: ORTHOPAEDIC SURGERY

## 2024-06-10 PROCEDURE — 3700000002 HC GENERAL ANESTHESIA TIME - EACH INCREMENTAL 1 MINUTE: Performed by: ORTHOPAEDIC SURGERY

## 2024-06-10 PROCEDURE — A6213 FOAM DRG >16<=48 SQ IN W/BDR: HCPCS | Performed by: ORTHOPAEDIC SURGERY

## 2024-06-10 DEVICE — IMPLANTABLE DEVICE: Type: IMPLANTABLE DEVICE | Site: HIP | Status: FUNCTIONAL

## 2024-06-10 DEVICE — STEM, FEM ACCOLADE + TMZF SYS 127D SZ4: Type: IMPLANTABLE DEVICE | Site: HIP | Status: FUNCTIONAL

## 2024-06-10 RX ORDER — ONDANSETRON 4 MG/1
4 TABLET, FILM COATED ORAL EVERY 8 HOURS PRN
Status: DISCONTINUED | OUTPATIENT
Start: 2024-06-10 | End: 2024-06-12 | Stop reason: HOSPADM

## 2024-06-10 RX ORDER — ACETAMINOPHEN 325 MG/1
650 TABLET ORAL EVERY 6 HOURS SCHEDULED
Status: DISCONTINUED | OUTPATIENT
Start: 2024-06-10 | End: 2024-06-12 | Stop reason: HOSPADM

## 2024-06-10 RX ORDER — DEXTROAMPHETAMINE SACCHARATE, AMPHETAMINE ASPARTATE, DEXTROAMPHETAMINE SULFATE AND AMPHETAMINE SULFATE 2.5; 2.5; 2.5; 2.5 MG/1; MG/1; MG/1; MG/1
10 TABLET ORAL 2 TIMES DAILY
Status: DISCONTINUED | OUTPATIENT
Start: 2024-06-11 | End: 2024-06-12 | Stop reason: HOSPADM

## 2024-06-10 RX ORDER — ONDANSETRON HYDROCHLORIDE 2 MG/ML
4 INJECTION, SOLUTION INTRAVENOUS EVERY 8 HOURS PRN
Status: DISCONTINUED | OUTPATIENT
Start: 2024-06-10 | End: 2024-06-12 | Stop reason: HOSPADM

## 2024-06-10 RX ORDER — DROPERIDOL 2.5 MG/ML
0.62 INJECTION, SOLUTION INTRAMUSCULAR; INTRAVENOUS ONCE AS NEEDED
Status: COMPLETED | OUTPATIENT
Start: 2024-06-10 | End: 2024-06-10

## 2024-06-10 RX ORDER — ONDANSETRON HYDROCHLORIDE 2 MG/ML
4 INJECTION, SOLUTION INTRAVENOUS ONCE AS NEEDED
Status: COMPLETED | OUTPATIENT
Start: 2024-06-10 | End: 2024-06-10

## 2024-06-10 RX ORDER — PROPOFOL 10 MG/ML
INJECTION, EMULSION INTRAVENOUS AS NEEDED
Status: DISCONTINUED | OUTPATIENT
Start: 2024-06-10 | End: 2024-06-10

## 2024-06-10 RX ORDER — SODIUM CHLORIDE 9 MG/ML
100 INJECTION, SOLUTION INTRAVENOUS CONTINUOUS
Status: DISCONTINUED | OUTPATIENT
Start: 2024-06-10 | End: 2024-06-12 | Stop reason: HOSPADM

## 2024-06-10 RX ORDER — ROCURONIUM BROMIDE 10 MG/ML
INJECTION, SOLUTION INTRAVENOUS AS NEEDED
Status: DISCONTINUED | OUTPATIENT
Start: 2024-06-10 | End: 2024-06-10

## 2024-06-10 RX ORDER — SODIUM CHLORIDE, SODIUM LACTATE, POTASSIUM CHLORIDE, CALCIUM CHLORIDE 600; 310; 30; 20 MG/100ML; MG/100ML; MG/100ML; MG/100ML
100 INJECTION, SOLUTION INTRAVENOUS CONTINUOUS
Status: ACTIVE | OUTPATIENT
Start: 2024-06-10 | End: 2024-06-11

## 2024-06-10 RX ORDER — OXYCODONE HYDROCHLORIDE 5 MG/1
10 TABLET ORAL EVERY 4 HOURS PRN
Status: DISCONTINUED | OUTPATIENT
Start: 2024-06-10 | End: 2024-06-12 | Stop reason: HOSPADM

## 2024-06-10 RX ORDER — POLYETHYLENE GLYCOL 3350 17 G/17G
17 POWDER, FOR SOLUTION ORAL DAILY
Status: DISCONTINUED | OUTPATIENT
Start: 2024-06-10 | End: 2024-06-12 | Stop reason: HOSPADM

## 2024-06-10 RX ORDER — AMLODIPINE BESYLATE 5 MG/1
5 TABLET ORAL NIGHTLY
Status: DISCONTINUED | OUTPATIENT
Start: 2024-06-11 | End: 2024-06-12 | Stop reason: HOSPADM

## 2024-06-10 RX ORDER — ROPINIROLE 1 MG/1
2 TABLET, FILM COATED ORAL NIGHTLY
Status: DISCONTINUED | OUTPATIENT
Start: 2024-06-10 | End: 2024-06-11

## 2024-06-10 RX ORDER — SODIUM CHLORIDE, SODIUM LACTATE, POTASSIUM CHLORIDE, CALCIUM CHLORIDE 600; 310; 30; 20 MG/100ML; MG/100ML; MG/100ML; MG/100ML
100 INJECTION, SOLUTION INTRAVENOUS CONTINUOUS
Status: CANCELLED | OUTPATIENT
Start: 2024-06-10

## 2024-06-10 RX ORDER — LIDOCAINE HCL/PF 100 MG/5ML
SYRINGE (ML) INTRAVENOUS AS NEEDED
Status: DISCONTINUED | OUTPATIENT
Start: 2024-06-10 | End: 2024-06-10

## 2024-06-10 RX ORDER — CELECOXIB 400 MG/1
400 CAPSULE ORAL ONCE
Status: COMPLETED | OUTPATIENT
Start: 2024-06-10 | End: 2024-06-10

## 2024-06-10 RX ORDER — OXYCODONE HYDROCHLORIDE 5 MG/1
5 TABLET ORAL EVERY 4 HOURS PRN
Status: DISCONTINUED | OUTPATIENT
Start: 2024-06-10 | End: 2024-06-12 | Stop reason: HOSPADM

## 2024-06-10 RX ORDER — SENNOSIDES 8.6 MG/1
1 TABLET ORAL NIGHTLY PRN
Status: DISCONTINUED | OUTPATIENT
Start: 2024-06-10 | End: 2024-06-12 | Stop reason: HOSPADM

## 2024-06-10 RX ORDER — ASPIRIN 81 MG/1
81 TABLET ORAL 2 TIMES DAILY
Status: DISCONTINUED | OUTPATIENT
Start: 2024-06-11 | End: 2024-06-12 | Stop reason: HOSPADM

## 2024-06-10 RX ORDER — ACETAMINOPHEN 325 MG/1
975 TABLET ORAL ONCE
Status: COMPLETED | OUTPATIENT
Start: 2024-06-10 | End: 2024-06-10

## 2024-06-10 RX ORDER — LABETALOL HYDROCHLORIDE 5 MG/ML
INJECTION, SOLUTION INTRAVENOUS AS NEEDED
Status: DISCONTINUED | OUTPATIENT
Start: 2024-06-10 | End: 2024-06-10

## 2024-06-10 RX ORDER — NALOXONE HYDROCHLORIDE 0.4 MG/ML
0.2 INJECTION, SOLUTION INTRAMUSCULAR; INTRAVENOUS; SUBCUTANEOUS EVERY 5 MIN PRN
Status: DISCONTINUED | OUTPATIENT
Start: 2024-06-10 | End: 2024-06-12 | Stop reason: HOSPADM

## 2024-06-10 RX ORDER — ONDANSETRON HYDROCHLORIDE 2 MG/ML
INJECTION, SOLUTION INTRAVENOUS AS NEEDED
Status: DISCONTINUED | OUTPATIENT
Start: 2024-06-10 | End: 2024-06-10

## 2024-06-10 RX ORDER — OXYCODONE HYDROCHLORIDE 5 MG/1
5 TABLET ORAL EVERY 4 HOURS PRN
Status: DISCONTINUED | OUTPATIENT
Start: 2024-06-10 | End: 2024-06-10 | Stop reason: HOSPADM

## 2024-06-10 RX ORDER — FENTANYL CITRATE 50 UG/ML
INJECTION, SOLUTION INTRAMUSCULAR; INTRAVENOUS AS NEEDED
Status: DISCONTINUED | OUTPATIENT
Start: 2024-06-10 | End: 2024-06-10

## 2024-06-10 RX ORDER — PAROXETINE HYDROCHLORIDE 20 MG/1
20 TABLET, FILM COATED ORAL DAILY
Status: DISCONTINUED | OUTPATIENT
Start: 2024-06-11 | End: 2024-06-12 | Stop reason: HOSPADM

## 2024-06-10 RX ORDER — SODIUM CHLORIDE, SODIUM LACTATE, POTASSIUM CHLORIDE, CALCIUM CHLORIDE 600; 310; 30; 20 MG/100ML; MG/100ML; MG/100ML; MG/100ML
100 INJECTION, SOLUTION INTRAVENOUS CONTINUOUS
Status: DISCONTINUED | OUTPATIENT
Start: 2024-06-10 | End: 2024-06-10 | Stop reason: HOSPADM

## 2024-06-10 RX ORDER — DEXTROAMPHETAMINE SACCHARATE, AMPHETAMINE ASPARTATE, DEXTROAMPHETAMINE SULFATE AND AMPHETAMINE SULFATE 2.5; 2.5; 2.5; 2.5 MG/1; MG/1; MG/1; MG/1
20 TABLET ORAL DAILY
Status: DISCONTINUED | OUTPATIENT
Start: 2024-06-11 | End: 2024-06-12 | Stop reason: HOSPADM

## 2024-06-10 RX ORDER — HYDROMORPHONE HYDROCHLORIDE 2 MG/ML
INJECTION, SOLUTION INTRAMUSCULAR; INTRAVENOUS; SUBCUTANEOUS AS NEEDED
Status: DISCONTINUED | OUTPATIENT
Start: 2024-06-10 | End: 2024-06-10

## 2024-06-10 RX ORDER — MIDAZOLAM HYDROCHLORIDE 1 MG/ML
INJECTION INTRAMUSCULAR; INTRAVENOUS AS NEEDED
Status: DISCONTINUED | OUTPATIENT
Start: 2024-06-10 | End: 2024-06-10

## 2024-06-10 RX ORDER — TRANEXAMIC ACID 100 MG/ML
INJECTION, SOLUTION INTRAVENOUS AS NEEDED
Status: DISCONTINUED | OUTPATIENT
Start: 2024-06-10 | End: 2024-06-10

## 2024-06-10 RX ADMIN — FENTANYL CITRATE 50 MCG: 50 INJECTION, SOLUTION INTRAMUSCULAR; INTRAVENOUS at 07:35

## 2024-06-10 RX ADMIN — HYDROMORPHONE HYDROCHLORIDE 0.5 MG: 1 INJECTION, SOLUTION INTRAMUSCULAR; INTRAVENOUS; SUBCUTANEOUS at 10:49

## 2024-06-10 RX ADMIN — ACETAMINOPHEN 975 MG: 325 TABLET ORAL at 06:56

## 2024-06-10 RX ADMIN — Medication 2 L/MIN: at 12:45

## 2024-06-10 RX ADMIN — LIDOCAINE HYDROCHLORIDE 50 MG: 20 INJECTION INTRAVENOUS at 07:54

## 2024-06-10 RX ADMIN — DROPERIDOL 0.62 MG: 2.5 INJECTION, SOLUTION INTRAMUSCULAR; INTRAVENOUS at 10:49

## 2024-06-10 RX ADMIN — TRANEXAMIC ACID 1000 MG: 1 INJECTION, SOLUTION INTRAVENOUS at 09:20

## 2024-06-10 RX ADMIN — SUGAMMADEX 200 MG: 100 INJECTION, SOLUTION INTRAVENOUS at 10:11

## 2024-06-10 RX ADMIN — DEXTROSE MONOHYDRATE 3 G: 5 INJECTION INTRAVENOUS at 15:55

## 2024-06-10 RX ADMIN — MIDAZOLAM HYDROCHLORIDE 1 MG: 1 INJECTION, SOLUTION INTRAMUSCULAR; INTRAVENOUS at 07:54

## 2024-06-10 RX ADMIN — PROPOFOL 150 MG: 10 INJECTION, EMULSION INTRAVENOUS at 07:54

## 2024-06-10 RX ADMIN — ONDANSETRON 4 MG: 2 INJECTION INTRAMUSCULAR; INTRAVENOUS at 08:15

## 2024-06-10 RX ADMIN — ROCURONIUM BROMIDE 70 MG: 10 INJECTION, SOLUTION INTRAVENOUS at 07:54

## 2024-06-10 RX ADMIN — ROPINIROLE HYDROCHLORIDE 2 MG: 1 TABLET, FILM COATED ORAL at 22:17

## 2024-06-10 RX ADMIN — ACETAMINOPHEN 650 MG: 325 TABLET ORAL at 17:23

## 2024-06-10 RX ADMIN — HYDROMORPHONE HYDROCHLORIDE 0.5 MG: 1 INJECTION, SOLUTION INTRAMUSCULAR; INTRAVENOUS; SUBCUTANEOUS at 10:31

## 2024-06-10 RX ADMIN — DEXTROSE MONOHYDRATE 2 G: 5 INJECTION INTRAVENOUS at 07:58

## 2024-06-10 RX ADMIN — SODIUM CHLORIDE 100 ML/HR: 9 INJECTION, SOLUTION INTRAVENOUS at 07:06

## 2024-06-10 RX ADMIN — FENTANYL CITRATE 50 MCG: 50 INJECTION, SOLUTION INTRAMUSCULAR; INTRAVENOUS at 07:54

## 2024-06-10 RX ADMIN — HYDROMORPHONE HYDROCHLORIDE 0.4 MG: 2 INJECTION, SOLUTION INTRAMUSCULAR; INTRAVENOUS; SUBCUTANEOUS at 08:15

## 2024-06-10 RX ADMIN — CELECOXIB 400 MG: 400 CAPSULE ORAL at 06:56

## 2024-06-10 RX ADMIN — HYDROMORPHONE HYDROCHLORIDE 0.4 MG: 2 INJECTION, SOLUTION INTRAMUSCULAR; INTRAVENOUS; SUBCUTANEOUS at 10:14

## 2024-06-10 RX ADMIN — POLYETHYLENE GLYCOL 3350 17 G: 17 POWDER, FOR SOLUTION ORAL at 16:04

## 2024-06-10 RX ADMIN — TRANEXAMIC ACID 1000 MG: 1 INJECTION, SOLUTION INTRAVENOUS at 08:00

## 2024-06-10 RX ADMIN — MIDAZOLAM HYDROCHLORIDE 1 MG: 1 INJECTION, SOLUTION INTRAMUSCULAR; INTRAVENOUS at 07:35

## 2024-06-10 RX ADMIN — OXYCODONE HYDROCHLORIDE 5 MG: 5 TABLET ORAL at 22:18

## 2024-06-10 RX ADMIN — DEXTROSE MONOHYDRATE 3 G: 5 INJECTION INTRAVENOUS at 22:17

## 2024-06-10 RX ADMIN — DEXAMETHASONE SODIUM PHOSPHATE 8 MG: 4 INJECTION INTRA-ARTICULAR; INTRALESIONAL; INTRAMUSCULAR; INTRAVENOUS; SOFT TISSUE at 08:15

## 2024-06-10 RX ADMIN — POVIDONE-IODINE 1 APPLICATION: 5 SOLUTION TOPICAL at 06:56

## 2024-06-10 RX ADMIN — LABETALOL HYDROCHLORIDE 5 MG: 5 INJECTION, SOLUTION INTRAVENOUS at 09:37

## 2024-06-10 RX ADMIN — SODIUM CHLORIDE, POTASSIUM CHLORIDE, SODIUM LACTATE AND CALCIUM CHLORIDE 100 ML/HR: 600; 310; 30; 20 INJECTION, SOLUTION INTRAVENOUS at 10:23

## 2024-06-10 RX ADMIN — Medication 6 L/MIN: at 10:23

## 2024-06-10 RX ADMIN — LABETALOL HYDROCHLORIDE 5 MG: 5 INJECTION, SOLUTION INTRAVENOUS at 10:11

## 2024-06-10 RX ADMIN — HYDROMORPHONE HYDROCHLORIDE 0.5 MG: 1 INJECTION, SOLUTION INTRAMUSCULAR; INTRAVENOUS; SUBCUTANEOUS at 10:59

## 2024-06-10 RX ADMIN — LABETALOL HYDROCHLORIDE 10 MG: 5 INJECTION, SOLUTION INTRAVENOUS at 08:20

## 2024-06-10 RX ADMIN — HYDROMORPHONE HYDROCHLORIDE 0.2 MG: 2 INJECTION, SOLUTION INTRAMUSCULAR; INTRAVENOUS; SUBCUTANEOUS at 09:17

## 2024-06-10 RX ADMIN — ONDANSETRON 4 MG: 2 INJECTION INTRAMUSCULAR; INTRAVENOUS at 10:31

## 2024-06-10 SDOH — SOCIAL STABILITY: SOCIAL INSECURITY: DO YOU FEEL UNSAFE GOING BACK TO THE PLACE WHERE YOU ARE LIVING?: NO

## 2024-06-10 SDOH — SOCIAL STABILITY: SOCIAL INSECURITY: ARE YOU OR HAVE YOU BEEN THREATENED OR ABUSED PHYSICALLY, EMOTIONALLY, OR SEXUALLY BY ANYONE?: NO

## 2024-06-10 SDOH — SOCIAL STABILITY: SOCIAL INSECURITY: ABUSE: ADULT

## 2024-06-10 SDOH — SOCIAL STABILITY: SOCIAL INSECURITY: DO YOU FEEL ANYONE HAS EXPLOITED OR TAKEN ADVANTAGE OF YOU FINANCIALLY OR OF YOUR PERSONAL PROPERTY?: NO

## 2024-06-10 SDOH — SOCIAL STABILITY: SOCIAL INSECURITY: WERE YOU ABLE TO COMPLETE ALL THE BEHAVIORAL HEALTH SCREENINGS?: YES

## 2024-06-10 SDOH — ECONOMIC STABILITY: INCOME INSECURITY: IN THE LAST 12 MONTHS, WAS THERE A TIME WHEN YOU WERE NOT ABLE TO PAY THE MORTGAGE OR RENT ON TIME?: NO

## 2024-06-10 SDOH — ECONOMIC STABILITY: HOUSING INSECURITY
IN THE LAST 12 MONTHS, WAS THERE A TIME WHEN YOU DID NOT HAVE A STEADY PLACE TO SLEEP OR SLEPT IN A SHELTER (INCLUDING NOW)?: NO

## 2024-06-10 SDOH — ECONOMIC STABILITY: TRANSPORTATION INSECURITY
IN THE PAST 12 MONTHS, HAS LACK OF TRANSPORTATION KEPT YOU FROM MEETINGS, WORK, OR FROM GETTING THINGS NEEDED FOR DAILY LIVING?: NO

## 2024-06-10 SDOH — SOCIAL STABILITY: SOCIAL INSECURITY: HAS ANYONE EVER THREATENED TO HURT YOUR FAMILY OR YOUR PETS?: NO

## 2024-06-10 SDOH — ECONOMIC STABILITY: INCOME INSECURITY: HOW HARD IS IT FOR YOU TO PAY FOR THE VERY BASICS LIKE FOOD, HOUSING, MEDICAL CARE, AND HEATING?: NOT HARD AT ALL

## 2024-06-10 SDOH — ECONOMIC STABILITY: TRANSPORTATION INSECURITY
IN THE PAST 12 MONTHS, HAS THE LACK OF TRANSPORTATION KEPT YOU FROM MEDICAL APPOINTMENTS OR FROM GETTING MEDICATIONS?: NO

## 2024-06-10 SDOH — SOCIAL STABILITY: SOCIAL INSECURITY: ARE THERE ANY APPARENT SIGNS OF INJURIES/BEHAVIORS THAT COULD BE RELATED TO ABUSE/NEGLECT?: NO

## 2024-06-10 SDOH — HEALTH STABILITY: MENTAL HEALTH: CURRENT SMOKER: 0

## 2024-06-10 SDOH — SOCIAL STABILITY: SOCIAL INSECURITY: DOES ANYONE TRY TO KEEP YOU FROM HAVING/CONTACTING OTHER FRIENDS OR DOING THINGS OUTSIDE YOUR HOME?: NO

## 2024-06-10 SDOH — SOCIAL STABILITY: SOCIAL INSECURITY: HAVE YOU HAD THOUGHTS OF HARMING ANYONE ELSE?: NO

## 2024-06-10 SDOH — ECONOMIC STABILITY: HOUSING INSECURITY: IN THE LAST 12 MONTHS, HOW MANY PLACES HAVE YOU LIVED?: 1

## 2024-06-10 ASSESSMENT — PATIENT HEALTH QUESTIONNAIRE - PHQ9
SUM OF ALL RESPONSES TO PHQ9 QUESTIONS 1 & 2: 0
2. FEELING DOWN, DEPRESSED OR HOPELESS: NOT AT ALL
1. LITTLE INTEREST OR PLEASURE IN DOING THINGS: NOT AT ALL

## 2024-06-10 ASSESSMENT — PAIN SCALES - GENERAL
PAINLEVEL_OUTOF10: 9
PAINLEVEL_OUTOF10: 8
PAINLEVEL_OUTOF10: 9
PAINLEVEL_OUTOF10: 8
PAINLEVEL_OUTOF10: 9
PAINLEVEL_OUTOF10: 6
PAINLEVEL_OUTOF10: 4
PAINLEVEL_OUTOF10: 10 - WORST POSSIBLE PAIN
PAINLEVEL_OUTOF10: 9
PAINLEVEL_OUTOF10: 10 - WORST POSSIBLE PAIN

## 2024-06-10 ASSESSMENT — COGNITIVE AND FUNCTIONAL STATUS - GENERAL
MOVING TO AND FROM BED TO CHAIR: A LITTLE
MOVING FROM LYING ON BACK TO SITTING ON SIDE OF FLAT BED WITH BEDRAILS: A LOT
STANDING UP FROM CHAIR USING ARMS: A LITTLE
DAILY ACTIVITIY SCORE: 23
MOVING FROM LYING ON BACK TO SITTING ON SIDE OF FLAT BED WITH BEDRAILS: A LITTLE
MOBILITY SCORE: 18
MOBILITY SCORE: 20
WALKING IN HOSPITAL ROOM: A LITTLE
PATIENT BASELINE BEDBOUND: NO
DAILY ACTIVITIY SCORE: 23
MOVING TO AND FROM BED TO CHAIR: A LITTLE
STANDING UP FROM CHAIR USING ARMS: A LITTLE
TURNING FROM BACK TO SIDE WHILE IN FLAT BAD: A LOT
WALKING IN HOSPITAL ROOM: A LITTLE
WALKING IN HOSPITAL ROOM: A LITTLE
MOBILITY SCORE: 15
STANDING UP FROM CHAIR USING ARMS: A LITTLE
TURNING FROM BACK TO SIDE WHILE IN FLAT BAD: A LITTLE
DRESSING REGULAR LOWER BODY CLOTHING: A LITTLE
CLIMB 3 TO 5 STEPS WITH RAILING: A LOT
DRESSING REGULAR LOWER BODY CLOTHING: A LITTLE
MOVING TO AND FROM BED TO CHAIR: A LITTLE
CLIMB 3 TO 5 STEPS WITH RAILING: A LITTLE
CLIMB 3 TO 5 STEPS WITH RAILING: A LITTLE

## 2024-06-10 ASSESSMENT — ACTIVITIES OF DAILY LIVING (ADL)
JUDGMENT_ADEQUATE_SAFELY_COMPLETE_DAILY_ACTIVITIES: YES
BATHING: INDEPENDENT
HEARING - RIGHT EAR: FUNCTIONAL
ADEQUATE_TO_COMPLETE_ADL: YES
FEEDING YOURSELF: INDEPENDENT
GROOMING: INDEPENDENT
LACK_OF_TRANSPORTATION: NO
WALKS IN HOME: INDEPENDENT
PATIENT'S MEMORY ADEQUATE TO SAFELY COMPLETE DAILY ACTIVITIES?: YES
TOILETING: INDEPENDENT
ADL_ASSISTANCE: INDEPENDENT
DRESSING YOURSELF: INDEPENDENT
HEARING - LEFT EAR: FUNCTIONAL

## 2024-06-10 ASSESSMENT — LIFESTYLE VARIABLES
SKIP TO QUESTIONS 9-10: 1
AUDIT-C TOTAL SCORE: 0
HOW OFTEN DO YOU HAVE 6 OR MORE DRINKS ON ONE OCCASION: NEVER
AUDIT-C TOTAL SCORE: 0
HOW MANY STANDARD DRINKS CONTAINING ALCOHOL DO YOU HAVE ON A TYPICAL DAY: PATIENT DOES NOT DRINK
PRESCIPTION_ABUSE_PAST_12_MONTHS: NO
HOW OFTEN DO YOU HAVE A DRINK CONTAINING ALCOHOL: NEVER
SUBSTANCE_ABUSE_PAST_12_MONTHS: NO

## 2024-06-10 ASSESSMENT — PAIN DESCRIPTION - LOCATION
LOCATION: HIP

## 2024-06-10 ASSESSMENT — COLUMBIA-SUICIDE SEVERITY RATING SCALE - C-SSRS
6. HAVE YOU EVER DONE ANYTHING, STARTED TO DO ANYTHING, OR PREPARED TO DO ANYTHING TO END YOUR LIFE?: NO
2. HAVE YOU ACTUALLY HAD ANY THOUGHTS OF KILLING YOURSELF?: NO
1. IN THE PAST MONTH, HAVE YOU WISHED YOU WERE DEAD OR WISHED YOU COULD GO TO SLEEP AND NOT WAKE UP?: NO

## 2024-06-10 ASSESSMENT — PAIN - FUNCTIONAL ASSESSMENT
PAIN_FUNCTIONAL_ASSESSMENT: 0-10

## 2024-06-10 ASSESSMENT — PAIN DESCRIPTION - ORIENTATION
ORIENTATION: RIGHT

## 2024-06-10 NOTE — PROGRESS NOTES
06/10/24 1601   Discharge Planning   Living Arrangements Alone   Support Systems Family members;Friends/neighbors   Assistance Needed Patient is A&O X3, on room air (uses CPAP at HS), independent with ADLs and uses a cane and walker for ambulation. Patient was driving prior to surgery. =   Type of Residence Private residence   Number of Stairs to Enter Residence 0   Number of Stairs Within Residence 0   Who is requesting discharge planning? Provider   Home or Post Acute Services Post acute facilities (Rehab/SNF/etc)   Type of Post Acute Facility Services Rehab;Skilled nursing   Patient expects to be discharged to: patient's preference is to DC to SNF ([facility]). PT/OT evalsuations are pending to determine NSOC.   Does the patient need discharge transport arranged? Yes   RoundTrip coordination needed? Yes   Has discharge transport been arranged? No   Financial Resource Strain   How hard is it for you to pay for the very basics like food, housing, medical care, and heating? Not hard   Housing Stability   In the last 12 months, was there a time when you were not able to pay the mortgage or rent on time? N   In the last 12 months, how many places have you lived? 1   In the last 12 months, was there a time when you did not have a steady place to sleep or slept in a shelter (including now)? N   Transportation Needs   In the past 12 months, has lack of transportation kept you from medical appointments or from getting medications? no   In the past 12 months, has lack of transportation kept you from meetings, work, or from getting things needed for daily living? No   Patient Choice   Provider Choice list and CMS website (https://medicare.gov/care-compare#search) for post-acute Quality and Resource Measure Data were provided and reviewed with: Patient   Patient / Family choosing to utilize agency / facility established prior to hospitalization No

## 2024-06-10 NOTE — ANESTHESIA PREPROCEDURE EVALUATION
Patient: Jojo Martin    Procedure Information       Anesthesia Start Date/Time: 06/10/24 0729    Procedure: ARTHROPLASTY TOTAL HIP ANTERIOR APPROACH (Right: Hip)    Location: [facility] / [facility]    Surgeons: David Corona MD          Vitals:    06/10/24 0603   BP: (!) 179/95   Pulse: 77   Resp: 20   Temp: 36.4 °C (97.5 °F)   SpO2: 99%       Past Surgical History:   Procedure Laterality Date    BREAST LUMPECTOMY Right     with 25 lymph nodes removed    CHOLECYSTECTOMY      HYSTERECTOMY  08/22/2016    Hysterectomy    KNEE ARTHROPLASTY Bilateral     MEDIPORT INSERTION, SINGLE Left     then removal for chemo    OTHER SURGICAL HISTORY  09/18/2018    Treatment Of The Left Ankle    OTHER SURGICAL HISTORY  09/18/2018    Oral Surgery    VEIN LIGATION AND STRIPPING Left      Past Medical History:   Diagnosis Date    Acquired absence of both cervix and uterus     History of hysterectomy    Acute upper respiratory infection, unspecified 09/09/2019    Acute URI    Anemia     Arthritis     Chronic sinusitis, unspecified 09/24/2019    Sinobronchitis    CKD (chronic kidney disease) stage 3, GFR 30-59 ml/min (Multi)     Delayed emergence from general anesthesia     Goiter     pt denies    History of recent fall     5/21/24  no injury    Hypertension     Narcolepsy ([facility])     Other specified postprocedural states 10/11/2018    Status post ORIF of fracture of ankle    Personal history of (healed) traumatic fracture 08/03/2018    History of fracture of left ankle    Personal history of diseases of the skin and subcutaneous tissue     History of erythematous condition    Personal history of malignant neoplasm of breast     History of malignant neoplasm of breast right    Personal history of other diseases of the respiratory system 11/15/2021    History of acute sinusitis    Personal history of other specified conditions 09/29/2021    History of diarrhea    Renal insufficiency     RLS (restless legs syndrome)      Rosacea     Sleep apnea     with CPAP    Unspecified nonsuppurative otitis media, unspecified ear 09/24/2019    Otitis media with effusion    Uses roller walker     Wears dentures     upper and lower full dentures    Wears glasses        Current Facility-Administered Medications:     ceFAZolin (Ancef) 3 g in dextrose 5% 100 mL IV, 3 g, intravenous, Once, Halima Terrazas, APRN-CNP    sodium chloride 0.9% infusion, 100 mL/hr, intravenous, Continuous, David Corona MD, Last Rate: 100 mL/hr at 06/10/24 0706, 100 mL/hr at 06/10/24 0706  Prior to Admission medications    Medication Sig Start Date End Date Taking? Authorizing Provider   amLODIPine (Norvasc) 5 mg tablet Take 1 tablet (5 mg) by mouth once daily at bedtime.   Yes Historical Provider, MD   amoxicillin (Amoxil) 500 mg capsule Take 1 capsule (500 mg) by mouth once daily.  Patient taking differently: Take 1 capsule (500 mg) by mouth once daily at bedtime. 5/20/24  Yes Ronel Greenfield DO   amphetamine-dextroamphetamine (Adderall) 10 mg tablet Take 1 tablet (10 mg) by mouth 3 times a day.   Yes Historical Provider, MD   amphetamine-dextroamphetamine (Adderall) 20 mg tablet Take 1 tablet (20 mg) by mouth once daily.   Yes Historical Provider, MD   ascorbic acid (Vitamin C) 500 mg tablet Take 2 tablets (1,000 mg) by mouth once daily.   Yes Historical Provider, MD   aspirin 81 mg EC tablet Take 1 tablet (81 mg) by mouth once daily at bedtime.   Yes Historical Provider, MD   diclofenac sodium (Voltaren) 1 % gel Apply 4.5 inches (4 g) topically 4 times a day. 3/5/24  Yes David Corona MD   magnesium 250 mg tablet Take 1 tablet (250 mg) by mouth once daily.   Yes Historical Provider, MD   melatonin 3 mg capsule Take 3 mg by mouth once daily at bedtime.   Yes Historical Provider, MD   multivitamin capsule Take 1 capsule by mouth once daily.   Yes Historical Provider, MD   niacin 500 mg tablet Take 1 tablet (500 mg) by mouth once daily with breakfast.   Yes  Historical Provider, MD   PaxiL 20 mg tablet TAKE 2 TABLET BY MOUTH EVERY DAY AS DIRECTED 12/18/23  Yes Historical Provider, MD   pitolisant (Wakix) 17.8 mg tablet Take 2 tablets by mouth once daily in the morning.   Yes Historical Provider, MD   rOPINIRole (Requip) 2 mg tablet Take 1 tablet (2 mg) by mouth once daily at bedtime.   Yes Historical Provider, MD   walker [facility] Rollator for pre and post op stability 5/23/24  Yes David Corona MD   ZINC ORAL Take 1 tablet by mouth once daily.   Yes Historical Provider, MD   aspirin 81 mg chewable tablet Chew 1 tablet (81 mg) 2 times a day for 28 days. 6/6/24 7/5/24  GM Gan   cefadroxil (Duricef) 500 mg capsule Take 1 capsule (500 mg) by mouth 2 times a day for 7 days. 6/6/24 6/14/24  GM Gan   docusate sodium (Colace) 100 mg capsule Take 1 capsule (100 mg) by mouth 2 times a day for 14 days. 6/6/24 6/21/24  GM Gan   meloxicam (Mobic) 7.5 mg tablet Take 1 tablet (7.5 mg) by mouth once daily.  Patient not taking: Reported on 6/10/2024 11/1/23 10/31/24  Ronel Greenfield,    oxyCODONE-acetaminophen (Percocet) 5-325 mg tablet Take 1 tablet by mouth every 4 hours if needed for severe pain (7 - 10) for up to 6 days. 6/6/24 6/13/24  GM Gan   polyethylene glycol (Glycolax, Miralax) 17 gram/dose powder Take 17 g by mouth once daily. 6/6/24   GM Gan     Allergies   Allergen Reactions    Anastrozole Other     Muscle/tissue break down    Ibuprofen Unknown     Motrin brand    Levocetirizine Unknown     Burning esophagus    Norethindrone Ac-Eth Estradiol Other     ALL BIRTH CONTROLS!!!!!Reports very high blood pressure with oral contraceptive    Paroxetine Hallucinations     Patient stated she can take the name brand, Paxil, but not the generic.. It is why/how she ended up in hospital    Tamoxifen Other     Suicidal      Social History     Tobacco Use    Smoking status: Never     Smokeless tobacco: Never   Substance Use Topics    Alcohol use: Never         Chemistry    Lab Results   Component Value Date/Time     05/23/2024 1332    K 4.6 05/23/2024 1332     05/23/2024 1332    CO2 28 05/23/2024 1332    BUN 28 (H) 05/23/2024 1332    CREATININE 0.97 05/23/2024 1332    Lab Results   Component Value Date/Time    CALCIUM 9.3 05/23/2024 1332    ALKPHOS 94 05/23/2024 1332    AST 43 (H) 05/23/2024 1332    ALT 51 (H) 05/23/2024 1332    BILITOT 0.7 05/23/2024 1332          Lab Results   Component Value Date/Time    WBC 5.9 05/23/2024 1332    HGB 11.6 (L) 05/23/2024 1332    HCT 36.2 05/23/2024 1332     05/23/2024 1332     Lab Results   Component Value Date/Time    PROTIME 11.3 07/16/2018 0941    INR 1.1 07/16/2018 0941     No results found for this or any previous visit (from the past 4464 hour(s)).  No results found for this or any previous visit from the past 1095 days.      Relevant Problems   Cardiac   (+) Essential (primary) hypertension      Pulmonary   (+) Asthmatic bronchitis ([facility])      Endocrine   (+) Goiter      Hematology   (+) Anemia      Musculoskeletal   (+) Primary osteoarthritis of both hips       Clinical information reviewed:    Allergies  Meds  Problems              NPO Detail:  NPO/Void Status  Date of Last Solid: 06/09/24  Time of Last Solid: 2330  Last Intake Type: Food         Physical Exam    Airway  Mallampati: II     Cardiovascular - normal exam     Dental    Pulmonary    Abdominal            Anesthesia Plan    History of general anesthesia?: yes  History of complications of general anesthesia?: no    ASA 3     MAC     The patient is not a current smoker.  Patient was not previously instructed to abstain from smoking on day of procedure.  Patient did not smoke on day of procedure.  Education provided regarding risk of obstructive sleep apnea.  intravenous induction   Anesthetic plan and risks discussed with patient.    Plan discussed with CRNA.

## 2024-06-10 NOTE — CONSULTS
Consults    Reason For Consult  Medical Management    History Of Present Illness  Jojo Martin is a 68 y.o. female with a hx of hip DJD who is admitted post-op by ortho service follow total hip arthroplasty. She failed conservative nonsurgical treatment as an outpatient so surgery planned for definitive treatment. Surgery went will without complications. At this time pt states pain is well controlled. Has no other acute complaints. Has otherwise been in normal state of health.      Past Medical History  She has a past medical history of Acquired absence of both cervix and uterus, Acute upper respiratory infection, unspecified (09/09/2019), Anemia, Arthritis, Chronic sinusitis, unspecified (09/24/2019), CKD (chronic kidney disease) stage 3, GFR 30-59 ml/min (Multi), Delayed emergence from general anesthesia, Goiter, History of recent fall, Hypertension, Narcolepsy ([facility]), Other specified postprocedural states (10/11/2018), Personal history of (healed) traumatic fracture (08/03/2018), Personal history of diseases of the skin and subcutaneous tissue, Personal history of malignant neoplasm of breast, Personal history of other diseases of the respiratory system (11/15/2021), Personal history of other specified conditions (09/29/2021), Renal insufficiency, RLS (restless legs syndrome), Rosacea, Sleep apnea, Unspecified nonsuppurative otitis media, unspecified ear (09/24/2019), Uses roller walker, Wears dentures, and Wears glasses.    Surgical History  She has a past surgical history that includes Hysterectomy (08/22/2016); Other surgical history (09/18/2018); Other surgical history (09/18/2018); Knee Arthroplasty (Bilateral); Cholecystectomy; Breast lumpectomy (Right); Vein ligation and stripping (Left); and Mediport insertion, single (Left).     Social History  She reports that she has never smoked. She has never used smokeless tobacco. She reports that she does not drink alcohol and does not use drugs.    Family  History  Family History   Problem Relation Name Age of Onset    Cancer Mother      Stomach cancer Mother      Other (Urinary bladder cancer) Father      Throat cancer Father      Cancer Sister      Brain cancer Sister      Breast cancer Sister      Brain cancer Sibling          Allergies  Anastrozole, Ibuprofen, Levocetirizine, Norethindrone ac-eth estradiol, Paroxetine, and Tamoxifen    Review of Systems  10 point review of systems neg except as noted in the HPI     Physical Exam  Gen: lying comfortably in bed, not in acute distress  HEENT: atraumatic, normocephalic  Pulm: normal respiratory effort, clear to auscultation b/l  Cardiac: RRR, no murmurs noted, normal S1/S2  GI: Soft, nontender, BS+  MSK: post-op dressing in place, appears clean and dry  Extremities: no LE edema, cyanosis  Neuro: AOX3, CN II-XII grossly intact, equal b/l strength, no loss in sensation   Psych: calm and appropriate for situation         Last Recorded Vitals  /75   Pulse 65   Temp 35.8 °C (96.4 °F) (Temporal)   Resp 16   Wt 104 kg (229 lb 4.5 oz)   SpO2 97%     Relevant Results  Scheduled medications  acetaminophen, 650 mg, oral, q6h YUDELKA  [START ON 6/11/2024] amLODIPine, 5 mg, oral, Nightly  [START ON 6/11/2024] amphetamine-dextroamphetamine, 10 mg, oral, BID  [START ON 6/11/2024] amphetamine-dextroamphetamine, 20 mg, oral, Daily  [START ON 6/11/2024] aspirin, 81 mg, oral, BID  ceFAZolin, 3 g, intravenous, q6h  [START ON 6/11/2024] PARoxetine, 20 mg, oral, Daily  pitolisant, 2 tablet, oral, q AM  polyethylene glycol, 17 g, oral, Daily  rOPINIRole, 2 mg, oral, Nightly      Continuous medications  lactated Ringer's, 100 mL/hr, Last Rate: 100 mL/hr (06/10/24 1245)  oxygen, 2 L/min  sodium chloride 0.9%, 100 mL/hr, Last Rate: Stopped (06/10/24 1029)      PRN medications  PRN medications: HYDROmorphone, naloxone, naloxone, ondansetron **OR** ondansetron, oxyCODONE, oxyCODONE, sennosides    No results found for this or any previous  visit (from the past 24 hour(s)).    FL less than 1 hour    Result Date: 6/10/2024  These images are not reportable by radiology and will not be interpreted by  Radiologists.    XR pelvis 1-2 views    Result Date: 6/10/2024  Interpreted By:  Santos Subramanian, STUDY: XR PELVIS 1-2 VIEWS 6/10/2024 10:41 am   INDICATION: Signs/Symptoms:post op RTHA   COMPARISON: 06/04/2024   ACCESSION NUMBER(S): [id number]   ORDERING CLINICIAN: ANATOLIY ZHANG   TECHNIQUE: A single AP view of the pelvis was obtained.   FINDINGS: The patient is noted to status post right total hip arthroplasty. There is no evidence of acute fracture or dislocation identified.       1.  Postoperative changes status post right hip arthroplasty. 2. No evidence of hardware failure or acute fracture..   MACRO: None.     Signed by: Santos Subramanian 6/10/2024 11:46 AM Dictation workstation:   [id number]        Assessment/Plan     67 yo F admitted by ortho service following elective total hip arthroplasty. Medicine consulted for general medical management. No acute medical problems to manage at this time.     Hip DJD: s/p SNEHA 6/10/24  -ortho following  -PT/OT  -pain control  -weight bearing as tolerated  -encourage IS  -DVT ppx on discharge per ortho service    2. Anxiety d/o: cont home paxil  3. HTN: cont home antiHTN  4. ADHD: cont home adderall    FEN: regular diet  Dispo: inpatient post-op management; dispo planning after PT evaluation      Dmitry Quiñones DO

## 2024-06-10 NOTE — HH CARE COORDINATION
Home Care received a Referral for Physical Therapy. We have processed the referral for a Start of Care on 06/11.     If you have any questions or concerns, please feel free to contact us at [phone]. Follow the prompts, enter your five digit zip code, and you will be directed to your care team on [facility].

## 2024-06-10 NOTE — ANESTHESIA POSTPROCEDURE EVALUATION
Patient: Jojo Martin    Procedure Summary       Date: 06/10/24 Room / Location: [facility] / [facility]    Anesthesia Start: 0729 Anesthesia Stop: 1030    Procedure: ARTHROPLASTY TOTAL HIP ANTERIOR APPROACH (Right: Hip) Diagnosis:       Right hip pain      (Right hip pain [M25.551])    Surgeons: David Corona MD Responsible Provider: Adarsh Qureshi MD    Anesthesia Type: MAC ASA Status: 3            Anesthesia Type: MAC    Vitals Value Taken Time   BP See vitals 06/10/24 1036   Temp  06/10/24 1036   Pulse  06/10/24 1036   Resp  06/10/24 1036   SpO2  06/10/24 1036       Anesthesia Post Evaluation    Patient location during evaluation: PACU  Patient participation: complete - patient participated  Level of consciousness: awake  Pain management: adequate  Multimodal analgesia pain management approach  Airway patency: patent  Two or more strategies used to mitigate risk of obstructive sleep apnea  Cardiovascular status: acceptable  Respiratory status: acceptable  Hydration status: acceptable  Postoperative Nausea and Vomiting: none        No notable events documented.

## 2024-06-10 NOTE — PROGRESS NOTES
Physical Therapy    Physical Therapy Evaluation & Treatment    Patient Name: Jojo Martin  MRN: [id number]  Today's Date: 6/10/2024   Time Calculation  Start Time: 1602  Stop Time: 1634  Time Calculation (min): 32 min    Assessment/Plan   PT Assessment  PT Assessment Results: Decreased strength, Decreased range of motion, Decreased endurance, Impaired balance, Decreased mobility  Rehab Prognosis: Good  Barriers to Discharge: comorbidities  Evaluation/Treatment Tolerance: Patient tolerated treatment well  Medical Staff Made Aware: Yes  Strengths: Ability to acquire knowledge, Housing layout  Barriers to Participation: Comorbidities, Support of Caregivers  End of Session Communication: Bedside nurse  Assessment Comment: Patient tolerates mobility well, limited support upon home going, lives at senior apartments, no need to manage stairs and is dependent on rollator for functional mobility. Rec mod intensity PT intervention at this time.  End of Session Patient Position: Up in chair, Alarm off, not on at start of session (no alarm necessary per staff, B LE elevated and ice to R hip)   IP OR SWING BED PT PLAN  Inpatient or Swing Bed: Inpatient  PT Plan  Treatment/Interventions: Bed mobility, Transfer training, Gait training, Stair training, Balance training, Strengthening, Endurance training  PT Plan: Skilled PT  PT Frequency: 3 times per week  PT Discharge Recommendations: Moderate intensity level of continued care  Equipment Recommended upon Discharge: Wheeled walker  PT Recommended Transfer Status: Assist x1  PT - OK to Discharge: Yes (per PT POC)      Subjective     General Visit Information:  General  Reason for Referral: Impaired functional mobility; R SNEHA  Referred By: David Corona  Past Medical History Relevant to Rehab: Anemia, HTN, Narcolepsy, mild Renal insufficiency, RLS, CKD Stage 3b, Thyroid Nodule, Reid OA Hips  Family/Caregiver Present: No  Prior to Session Communication: Bedside nurse  Patient  Position Received: Bed, 3 rail up, Alarm off, not on at start of session  General Comment: Patient pleasant, cooperative and eager to improve  Home Living:  Home Living  Type of Home: Apartment  Lives With: Alone  Home Adaptive Equipment: Walker rolling or standard (rollator)  Home Layout: One level  Home Access: Level entry  Bathroom Shower/Tub: Tub/shower unit  Bathroom Toilet: Handicapped height  Bathroom Equipment: Grab bars in shower  Prior Level of Function:  Prior Function Per Pt/Caregiver Report  Level of [place]: Independent with ADLs and functional transfers  ADL Assistance: Independent  Homemaking Assistance: Independent  Ambulatory Assistance: Independent  Prior Function Comments: Neighbors are helpful prn  Precautions:  Precautions  LE Weight Bearing Status: Weight Bearing as Tolerated  Medical Precautions: Fall precautions (IV)  Post-Surgical Precautions: Right hip precautions (Anterior)    Objective   Pain:  Pain Assessment  Pain Assessment: 0-10  Pain Score: 4  Pain Interventions: Cold applied, Repositioned  Cognition:  Cognition  Overall Cognitive Status: Within Functional Limits  Orientation Level: Oriented X4    General Assessments:    Activity Tolerance  Endurance: Tolerates 30 min exercise with multiple rests    Sensation  Light Touch: No apparent deficits    Strength  Strength Comments: L LE 4/5, R hip 3+/5, R knee and ankle 4/5    Coordination  Movements are Fluid and Coordinated: Yes    Postural Control  Postural Control: Within Functional Limits    Static Sitting Balance  Static Sitting-Balance Support: No upper extremity supported, Feet supported  Static Sitting-Level of Assistance: Close supervision    Static Standing Balance  Static Standing-Balance Support: Bilateral upper extremity supported  Static Standing-Level of Assistance: Contact guard  Functional Assessments:       Bed Mobility  Bed Mobility: Yes  Bed Mobility 1  Bed Mobility 1: Supine to sitting  Level of Assistance 1:  Moderate assistance (R LE)    Transfers  Transfer: Yes  Transfer 1  Transfer From 1: Sit to, Stand to  Transfer to 1: Sit, Stand  Technique 1: Sit to stand, Stand to sit  Transfer Device 1: Walker  Transfer Level of Assistance 1: Contact guard    Ambulation/Gait Training  Ambulation/Gait Training Performed: Yes  Ambulation/Gait Training 1  Surface 1: Level tile  Device 1: Rolling walker  Assistance 1: Contact guard  Quality of Gait 1:  (decreased weight bearing R LE, decreased juan)  Comments/Distance (ft) 1: 3' (bed>chair)    Treatments:   Patient encouraged to complete supine ther ex 3x/day. Reviewed 15  reps each: ankle pumps, quad sets, glut sets, hip abd/add, heel slides, SAQ. Patient advised home care will advance ther ex as tolerated   Encouraged to take short duration ambulation bouts hourly during waking hours with the use of 2WW, focusing on gait pattern.   Educated on the importance of avoiding remaining in one position for extended period of time. Encouraged pain and edema control with use of ice, elevation and activity pacing. Patient verbalized understanding.     Reviewed SNEHA precautions   Home going packet reviewed and provided to patient. Patient verbalized understanding.       Bed Mobility  Bed Mobility: Yes  Bed Mobility 1  Bed Mobility 1: Supine to sitting  Level of Assistance 1: Moderate assistance (R LE)    Ambulation/Gait Training  Ambulation/Gait Training Performed: Yes  Ambulation/Gait Training 1  Surface 1: Level tile  Device 1: Rolling walker  Assistance 1: Contact guard  Quality of Gait 1:  (decreased weight bearing R LE, decreased juan)  Comments/Distance (ft) 1: 3' (bed>chair)  Transfers  Transfer: Yes  Transfer 1  Transfer From 1: Sit to, Stand to  Transfer to 1: Sit, Stand  Technique 1: Sit to stand, Stand to sit  Transfer Device 1: Walker  Transfer Level of Assistance 1: Contact guard       Outcome Measures:  [facility] Basic Mobility  Turning from your back to your side while in a  flat bed without using bedrails: A lot  Moving from lying on your back to sitting on the side of a flat bed without using bedrails: A lot  Moving to and from bed to chair (including a wheelchair): A little  Standing up from a chair using your arms (e.g. wheelchair or bedside chair): A little  To walk in hospital room: A little  Climbing 3-5 steps with railing: A lot  Basic Mobility - Total Score: 15    Encounter Problems       Encounter Problems (Active)       Mobility       Patient will demonstrate good understanding of bed mobility, transfers, ambulation, stair negotiation and HEP for safe home going.   (Progressing)       Start:  06/10/24    Expected End:  06/17/24                   Education Documentation  Handouts, taught by Natalia Ladd PT at 6/10/2024  5:44 PM.  Learner: Patient  Readiness: Acceptance  Method: Explanation, Handout  Response: Verbalizes Understanding, Demonstrated Understanding    Precautions, taught by Natalia Ladd PT at 6/10/2024  5:44 PM.  Learner: Patient  Readiness: Acceptance  Method: Explanation, Handout  Response: Verbalizes Understanding, Demonstrated Understanding    Body Mechanics, taught by Natalia Ladd PT at 6/10/2024  5:44 PM.  Learner: Patient  Readiness: Acceptance  Method: Explanation, Handout  Response: Verbalizes Understanding, Demonstrated Understanding    Home Exercise Program, taught by Natalia Ladd PT at 6/10/2024  5:44 PM.  Learner: Patient  Readiness: Acceptance  Method: Explanation, Handout  Response: Verbalizes Understanding, Demonstrated Understanding    Mobility Training, taught by Natalia Ladd PT at 6/10/2024  5:44 PM.  Learner: Patient  Readiness: Acceptance  Method: Explanation, Handout  Response: Verbalizes Understanding, Demonstrated Understanding    Education Comments  No comments found.

## 2024-06-10 NOTE — OP NOTE
ARTHROPLASTY TOTAL HIP ANTERIOR APPROACH (R) Operative Note     Date: 6/10/2024  OR Location: [facility] OR    Name: Jojo Martin, : 1955, Age: 68 y.o., MRN: [id number], Sex: female    Diagnosis  Pre-op Diagnosis     * Right hip pain [M25.551] Post-op Diagnosis     * Right hip pain [M25.551]     Procedures  ARTHROPLASTY TOTAL HIP ANTERIOR APPROACH  17919 - CO ARTHRP ACETBLR/PROX FEM PROSTC AGRFT/ALGRFT      Surgeons      * David Corona - Primary    Resident/Fellow/Other Assistant:  Surgeons and Role:  * No surgeons found with a matching role *    Procedure Summary  Anesthesia: Spinal  ASA: III  Anesthesia Staff: Anesthesiologist: Adarsh Qureshi MD  CRNA: SHAYLEE Martin-CRNA  Estimated Blood Loss: 250mL  Intra-op Medications:   Administrations occurring from 0730 to 0930 on 06/10/24:   Medication Name Total Dose   EPINEPHrine (Adrenalin) 0.2 mL, ketorolac (Toradol) 30 mg, morphine PF (Duramorph) 5 mg, ropivacaine (Naropin) 5 mg/mL (0.5 %) 30 mL in sodium chloride 0.9% 20 mL syringe 56.2 mL   ceFAZolin (Ancef) 3 g in dextrose 5% 100 mL IV 2 g              Anesthesia Record               Intraprocedure I/O Totals          Intake    Tranexamic Acid 0.00 mL    The total shown is the total volume documented since Anesthesia Start was filed.    sodium chloride 0.9% infusion 900.00 mL    Total Intake 900 mL       Output    Est. Blood Loss 250 mL    Total Output 250 mL       Net    Net Volume 650 mL          Specimen:   ID Type Source Tests Collected by Time   1 : RIGHT FEMORAL HEAD Tissue FEMORAL HEAD RIGHT SURGICAL PATHOLOGY EXAM David Corona MD 6/10/2024 0858        Staff:   Circulator: Jailene Hyde Person: Elver  RNFA: Damien         Drains and/or Catheters: * None in log *    Tourniquet Times:         Implants:  Implants       Type Name Action Serial No.      Joint SHELL, TRIDENT II, CLUSTERHOLE, HA 48D - [id number] Implanted      Joint INSERT, TRIDENT X3 POLYETHYLENE, 0 DEG, 32MM D - [id number]  Implanted      Joint STEM, FEM ACCOLADE + TMZF SYS 127D SZ4 - [id number] Implanted      Joint HEAD, FEMUR V40 32MM -4MM BIOLOX DELTA - [id number] Implanted               Findings: Severe DJD    Indications: Jojo Martin is an 68 y.o. female who is having surgery for Right hip pain [M25.551].     The patient was seen in the preoperative area. The risks, benefits, complications, treatment options, non-operative alternatives, expected recovery and outcomes were discussed with the patient. The possibilities of reaction to medication, pulmonary aspiration, injury to surrounding structures, bleeding, recurrent infection, the need for additional procedures, failure to diagnose a condition, and creating a complication requiring transfusion or operation were discussed with the patient. The patient concurred with the proposed plan, giving informed consent.  The site of surgery was properly noted/marked if necessary per policy. The patient has been actively warmed in preoperative area. Preoperative antibiotics have been ordered and given within 1 hours of incision. Venous thrombosis prophylaxis have been ordered including bilateral sequential compression devices and chemical prophylaxis    Procedure Details: Statement of medical necessity:    This is a 68 y.o. female with severe degenerative disease of the hip that has failed non operative management. the risks, benefits and alternatives of total hip arthroplasty were discussed with the patient and they signed informed consent.     Justification for 22 modifier:    The patient has an elevated BMI and morbid obesity.  Because of that the patient has a significantly increased soft tissue envelope.  Significant additional work and effort was required to complete the surgery.  This work was required and surgical exposure bony resections as well as component implantation.  This went significantly beyond the usual work time and effort required to complete the surgery therefore 22  modifier is justified     DESCRIPTION OF PROCEDURE:  The patient was brought to the operative room, and anesthesia was initiated by the anesthesia team. Appropriate preoperative antibiotics were given. The patient was then secured to the [place] table in the standard fashion. The patient was prepped and draped in the usual sterile fashion, a time out was performed, the patient was identified by name and medical record number and the laterality and site of surgery were confirmed by all present.  Standard direct anterior approach to the hip was made. Hemostasis was obtained with Bovie electrocautery. Anterior and superior capsulectomy was performed in the usual fashion. Femoral neck osteotomy was performed in accordance with the preoperative plan, and femoral head extracted from the acetabulum without difficulty. There was noted to be loss of articular cartilage from the femoral head and acetabulum as well as osteophyte formation on both the femoral and acetabular sides.     Periacetabular retractors were placed, with excellent exposure of the acetabulum being obtained. Remnants of the acetabular labrum were excised. The medial wall of the acetabulum was developed with a reamer 5 mm smaller than the preoperative plan. Reaming then continued circumferentially. The final reamer was line to line with the the definitive implant size. Good quality bone was noted. After irrigation, the acetabular component was inserted with the appropriate degree of abduction and anteversion based upon anatomic landmarks. Appropriate positioning was confirmed with an AP image of the pelvis and AP of the operative hip using C-arm. After terminal impaction, the highly cross-linked polyethylene liner was inserted into the acetabular component and locking mechanism engaged in the usual fashion.    Attention was then directed to the femoral side. The femoral elevating hook was inserted. Further soft tissue release was performed to allow anterior  translation of the proximal femur. This included release of the obturator internus tendon. The obturator externus was preserved. After adequate mobilization of the femur, the medullary canal was developed with a curved curette followed by a reverse cutting rasp. The proximal femur is then prepared using sequentially larger broaches up to the definitive implant size. The final broach was both rotationally and longitudinally stable. The hip was reduced and flouro was used to assess the implant. There was excellent fit and fill of the implant and the leg lengths looked appropriate comparing the lesser tuberosities to the ischium on both sides. Stability was then tested; with 60 degrees of external rotation of the hip and full extension the implant was stable.     Trial implant was removed from the femur. The definitive implant was inserted. The trunnion of the femoral component was then cleaned and dried, followed by impaction of the definitive prosthetic femoral head. The hip was reduced with normal findings again noted.    The wound was irrigated with irrisept solution followed by normal saline. The pericapsular soft tissues were injected with ropivicaine, epinephrine, toradol,  and duramorph. The myofascia was closed using interrupted #1 polysorb, then #2 quill, followed by skin closure using inverted 2-0 poly, 3-0 v-lock in the subcuticular layer, and perineo dressing.  Mepilex AG silver impregnated dressing was then placed. Patient was awoken from anesthesia by the anesthesia team and brought to the pacu in stable condition    Post operative plan: patient may bear weight as tolerated, anterior hip precautions, antibiotics and dvt prophylaxis per protocol, standard post operative supportive care     Statement of staff presence: I was present during all key and critical portions of the procedure and immediately available during closure.        Complications:  None; patient tolerated the procedure well.     Disposition: PACU - hemodynamically stable.  Condition: stable         Additional Details: none    Attending Attestation: I was present and scrubbed for the key portions of the procedure.    David Corona  Phone Number: [phone]

## 2024-06-10 NOTE — CARE PLAN
The patient's goals for the shift include pain control    The clinical goals for the shift include pain control & ambulation

## 2024-06-10 NOTE — ANESTHESIA PROCEDURE NOTES
Spinal Block    Patient location during procedure: OB  Start time: 6/10/2024 7:38 AM  End time: 6/10/2024 7:50 AM  Reason for block: primary anesthetic  Staffing  Performed: CRNA   Authorized by: Adarsh Qureshi MD    Performed by: KHALIF Martin    Preanesthetic Checklist  Completed: patient identified, IV checked, risks and benefits discussed, surgical consent, monitors and equipment checked, pre-op evaluation, timeout performed and sterile techniques followed  Block Timeout  RN/Licensed healthcare professional reads aloud to the Anesthesia provider and entire team: Patient identity, procedure with side and site, patient position, and as applicable the availability of implants/special equipment/special requirements.  Patient on coagulant treatment: no  Timeout performed at: 6/10/2024 7:33 AM  Spinal Block  Patient position: sitting  Prep: ChloraPrep  Sterility prep: cap, gloves, hand hygiene, mask and drape  Sedation level: light sedation  Patient monitoring: blood pressure, continuous pulse oximetry, EKG, heart rate and ETCO2  Approach: midline  Vertebral space: L3-4  Needle  Needle type: pencil-point   Needle gauge: 22 G  Needle length: 10.2 cm      Assessment  Events: procedure abandoned  Procedure assessment: patient sedated but conversant throughout procedure and patient tolerated procedure well with no immediate complications  Additional Notes  Multiple attempts at L2-L3 and L3-L4 without success. Proceed to GA.

## 2024-06-10 NOTE — ANESTHESIA PROCEDURE NOTES
Airway  Date/Time: 6/10/2024 7:56 AM  Urgency: elective    Airway not difficult    Staffing  Performed: CRNA   Authorized by: Adarsh Qureshi MD    Performed by: KHALIF Martin  Patient location during procedure: OR    Indications and Patient Condition  Indications for airway management: anesthesia  Spontaneous Ventilation: absent  Sedation level: deep  Preoxygenated: yes  Patient position: sniffing  Mask difficulty assessment: 1 - vent by mask    Final Airway Details  Final airway type: endotracheal airway      Successful airway: ETT  Cuffed: yes   Successful intubation technique: video laryngoscopy  Facilitating devices/methods: intubating stylet  Endotracheal tube insertion site: oral  Blade: Susy  Blade size: #3  ETT size (mm): 7.5  Cormack-Lehane Classification: grade I - full view of glottis  Placement verified by: chest auscultation and capnometry   Measured from: lips  ETT to lips (cm): 22  Number of attempts at approach: 1  Number of other approaches attempted: 0

## 2024-06-11 LAB
ANION GAP SERPL CALC-SCNC: 15 MMOL/L (ref 10–20)
BUN SERPL-MCNC: 35 MG/DL (ref 6–23)
CALCIUM SERPL-MCNC: 8.2 MG/DL (ref 8.6–10.3)
CHLORIDE SERPL-SCNC: 104 MMOL/L (ref 98–107)
CO2 SERPL-SCNC: 22 MMOL/L (ref 21–32)
CREAT SERPL-MCNC: 1.18 MG/DL (ref 0.5–1.05)
EGFRCR SERPLBLD CKD-EPI 2021: 51 ML/MIN/1.73M*2
ERYTHROCYTE [DISTWIDTH] IN BLOOD BY AUTOMATED COUNT: 14 % (ref 11.5–14.5)
GLUCOSE SERPL-MCNC: 110 MG/DL (ref 74–99)
HCT VFR BLD AUTO: 30.4 % (ref 36–46)
HGB BLD-MCNC: 9.4 G/DL (ref 12–16)
MCH RBC QN AUTO: 29.6 PG (ref 26–34)
MCHC RBC AUTO-ENTMCNC: 30.9 G/DL (ref 32–36)
MCV RBC AUTO: 96 FL (ref 80–100)
NRBC BLD-RTO: 0 /100 WBCS (ref 0–0)
PLATELET # BLD AUTO: 192 X10*3/UL (ref 150–450)
POTASSIUM SERPL-SCNC: 4.5 MMOL/L (ref 3.5–5.3)
RBC # BLD AUTO: 3.18 X10*6/UL (ref 4–5.2)
SODIUM SERPL-SCNC: 136 MMOL/L (ref 136–145)
WBC # BLD AUTO: 7.1 X10*3/UL (ref 4.4–11.3)

## 2024-06-11 PROCEDURE — 85027 COMPLETE CBC AUTOMATED: CPT | Performed by: NURSE PRACTITIONER

## 2024-06-11 PROCEDURE — 2500000004 HC RX 250 GENERAL PHARMACY W/ HCPCS (ALT 636 FOR OP/ED): Performed by: NURSE PRACTITIONER

## 2024-06-11 PROCEDURE — 36415 COLL VENOUS BLD VENIPUNCTURE: CPT | Performed by: NURSE PRACTITIONER

## 2024-06-11 PROCEDURE — 97535 SELF CARE MNGMENT TRAINING: CPT | Mod: GO

## 2024-06-11 PROCEDURE — 2500000001 HC RX 250 WO HCPCS SELF ADMINISTERED DRUGS (ALT 637 FOR MEDICARE OP): Performed by: NURSE PRACTITIONER

## 2024-06-11 PROCEDURE — 97110 THERAPEUTIC EXERCISES: CPT | Mod: GP

## 2024-06-11 PROCEDURE — 94760 N-INVAS EAR/PLS OXIMETRY 1: CPT | Mod: 59

## 2024-06-11 PROCEDURE — 7100000011 HC EXTENDED STAY RECOVERY HOURLY - NURSING UNIT

## 2024-06-11 PROCEDURE — 2500000001 HC RX 250 WO HCPCS SELF ADMINISTERED DRUGS (ALT 637 FOR MEDICARE OP): Performed by: STUDENT IN AN ORGANIZED HEALTH CARE EDUCATION/TRAINING PROGRAM

## 2024-06-11 PROCEDURE — 80048 BASIC METABOLIC PNL TOTAL CA: CPT | Performed by: NURSE PRACTITIONER

## 2024-06-11 PROCEDURE — 2500000002 HC RX 250 W HCPCS SELF ADMINISTERED DRUGS (ALT 637 FOR MEDICARE OP, ALT 636 FOR OP/ED): Performed by: NURSE PRACTITIONER

## 2024-06-11 PROCEDURE — 97165 OT EVAL LOW COMPLEX 30 MIN: CPT | Mod: GO

## 2024-06-11 PROCEDURE — 99232 SBSQ HOSP IP/OBS MODERATE 35: CPT | Performed by: NURSE PRACTITIONER

## 2024-06-11 RX ORDER — ROPINIROLE 1 MG/1
1 TABLET, FILM COATED ORAL NIGHTLY
Status: DISCONTINUED | OUTPATIENT
Start: 2024-06-11 | End: 2024-06-12 | Stop reason: HOSPADM

## 2024-06-11 RX ADMIN — ACETAMINOPHEN 650 MG: 325 TABLET ORAL at 06:11

## 2024-06-11 RX ADMIN — ACETAMINOPHEN 650 MG: 325 TABLET ORAL at 11:27

## 2024-06-11 RX ADMIN — AMLODIPINE BESYLATE 5 MG: 5 TABLET ORAL at 21:22

## 2024-06-11 RX ADMIN — ACETAMINOPHEN 650 MG: 325 TABLET ORAL at 17:46

## 2024-06-11 RX ADMIN — OXYCODONE HYDROCHLORIDE 10 MG: 5 TABLET ORAL at 21:50

## 2024-06-11 RX ADMIN — ASPIRIN 81 MG: 81 TABLET, COATED ORAL at 21:22

## 2024-06-11 RX ADMIN — ROPINIROLE HYDROCHLORIDE 1 MG: 1 TABLET, FILM COATED ORAL at 21:50

## 2024-06-11 RX ADMIN — POLYETHYLENE GLYCOL 3350 17 G: 17 POWDER, FOR SOLUTION ORAL at 09:00

## 2024-06-11 RX ADMIN — OXYCODONE HYDROCHLORIDE 10 MG: 5 TABLET ORAL at 11:27

## 2024-06-11 RX ADMIN — PAROXETINE 20 MG: 20 TABLET, FILM COATED ORAL at 08:15

## 2024-06-11 RX ADMIN — ASPIRIN 81 MG: 81 TABLET, COATED ORAL at 08:15

## 2024-06-11 RX ADMIN — OXYCODONE HYDROCHLORIDE 10 MG: 5 TABLET ORAL at 17:46

## 2024-06-11 RX ADMIN — OXYCODONE HYDROCHLORIDE 10 MG: 5 TABLET ORAL at 06:11

## 2024-06-11 ASSESSMENT — PAIN SCALES - GENERAL
PAINLEVEL_OUTOF10: 5 - MODERATE PAIN
PAINLEVEL_OUTOF10: 8
PAINLEVEL_OUTOF10: 3
PAINLEVEL_OUTOF10: 3
PAINLEVEL_OUTOF10: 8
PAINLEVEL_OUTOF10: 7
PAINLEVEL_OUTOF10: 6
PAINLEVEL_OUTOF10: 0 - NO PAIN
PAINLEVEL_OUTOF10: 5 - MODERATE PAIN
PAINLEVEL_OUTOF10: 8
PAINLEVEL_OUTOF10: 5 - MODERATE PAIN
PAINLEVEL_OUTOF10: 8

## 2024-06-11 ASSESSMENT — COGNITIVE AND FUNCTIONAL STATUS - GENERAL
MOVING FROM LYING ON BACK TO SITTING ON SIDE OF FLAT BED WITH BEDRAILS: A LOT
DRESSING REGULAR LOWER BODY CLOTHING: A LITTLE
MOBILITY SCORE: 20
STANDING UP FROM CHAIR USING ARMS: A LITTLE
DRESSING REGULAR LOWER BODY CLOTHING: A LITTLE
TOILETING: A LITTLE
MOVING TO AND FROM BED TO CHAIR: A LITTLE
STANDING UP FROM CHAIR USING ARMS: A LITTLE
CLIMB 3 TO 5 STEPS WITH RAILING: A LITTLE
TURNING FROM BACK TO SIDE WHILE IN FLAT BAD: A LOT
MOBILITY SCORE: 15
WALKING IN HOSPITAL ROOM: A LITTLE
DRESSING REGULAR LOWER BODY CLOTHING: A LOT
DAILY ACTIVITIY SCORE: 19
CLIMB 3 TO 5 STEPS WITH RAILING: A LOT
CLIMB 3 TO 5 STEPS WITH RAILING: A LITTLE
MOVING TO AND FROM BED TO CHAIR: A LITTLE
DAILY ACTIVITIY SCORE: 23
WALKING IN HOSPITAL ROOM: A LITTLE
MOVING TO AND FROM BED TO CHAIR: A LITTLE
WALKING IN HOSPITAL ROOM: A LITTLE
STANDING UP FROM CHAIR USING ARMS: A LITTLE
HELP NEEDED FOR BATHING: A LOT

## 2024-06-11 ASSESSMENT — PAIN - FUNCTIONAL ASSESSMENT
PAIN_FUNCTIONAL_ASSESSMENT: 0-10

## 2024-06-11 ASSESSMENT — ACTIVITIES OF DAILY LIVING (ADL)
ADL_ASSISTANCE: INDEPENDENT
BATHING_ASSISTANCE: MODERATE
HOME_MANAGEMENT_TIME_ENTRY: 23

## 2024-06-11 NOTE — PROGRESS NOTES
Physical Therapy    Physical Therapy Treatment    Patient Name: Jojo Martin  MRN: [id number]  Today's Date: 6/11/2024  Time Calculation  Start Time: 1106  Stop Time: 1123  Time Calculation (min): 17 min    Assessment/Plan   PT Assessment  PT Assessment Results: Decreased strength, Decreased range of motion, Decreased endurance, Impaired balance, Decreased mobility  Rehab Prognosis: Good  Barriers to Discharge: comorbidities  Evaluation/Treatment Tolerance: Patient tolerated treatment well  Medical Staff Made Aware: Yes  Strengths: Ability to acquire knowledge, Access to adaptive/assistive products, Attitude of self, Housing layout  Barriers to Participation: Support of Caregivers  End of Session Communication: Bedside nurse  Assessment Comment: Patient tolerated treatment and ambulation well. Patient demonstrated adequate understanding of anterior hip precautions. Recommend mod intensity PT intervention.  End of Session Patient Position: Up in chair, Alarm off, not on at start of session (No alarm necessary per staff)     PT Plan  Treatment/Interventions: Transfer training, Gait training, Strengthening, Endurance training, Range of motion, Bed mobility  PT Plan: Skilled PT  PT Frequency: 3 times per week  PT Discharge Recommendations: Moderate intensity level of continued care  Equipment Recommended upon Discharge: Wheeled walker  PT Recommended Transfer Status: Assist x1  PT - OK to Discharge: Yes (Per PT poc)      General Visit Information:   PT  Visit  PT Received On: 06/11/24  Response to Previous Treatment: Patient with no complaints from previous session.  General  Reason for Referral: Impaired mobility, R SNEHA  Referred By: David Corona  Past Medical History Relevant to Rehab: Anemia, HTN, Narcolepsy, mild Renal insufficiency, RLS, CKD Stage 3b, Thyroid Nodule, Reid OA Hips  Family/Caregiver Present: No  Prior to Session Communication: Bedside nurse  Patient Position Received: Up in chair, Alarm off, not  on at start of session  General Comment: Pt. pleasant and cooperative to participate in tx    Subjective   Precautions:  Precautions  LE Weight Bearing Status: Weight Bearing as Tolerated  Medical Precautions: Fall precautions  Post-Surgical Precautions: Right hip precautions (Anterior)    Objective   Pain:  Pain Assessment  Pain Assessment: 0-10  Pain Score: 5 - Moderate pain  Pain Location: Hip  Pain Orientation: Right  Pain Interventions: Cold applied, Repositioned, Medication (See MAR) (RN to provide medication per pt request)  Cognition:  Cognition  Overall Cognitive Status: Within Functional Limits  Orientation Level: Oriented X4  Coordination:  Movements are Fluid and Coordinated: Yes  Postural Control:  Postural Control  Postural Control: Within Functional Limits  Static Sitting Balance  Static Sitting-Balance Support: No upper extremity supported, Feet supported  Static Sitting-Level of Assistance: Close supervision  Static Standing Balance  Static Standing-Balance Support: Bilateral upper extremity supported  Static Standing-Level of Assistance: Contact guard    Activity Tolerance:  Activity Tolerance  Endurance: Tolerates 10 - 20 min exercise with multiple rests  Treatments:  Therapeutic Exercise  Therapeutic Exercise Performed: Yes  Therapeutic Exercise Activity 1: Patient encouraged to complete supine ther ex 3x/day. Reviewed 15  reps each: ankle pumps, quad sets, glut sets, hip abd/add, heel slides, SAQ. Patient advised home care will advance ther ex as tolerated   Encouraged to take short duration ambulation bouts hourly during waking hours with the use of 2WW, focusing on gait pattern.   Educated on the importance of avoiding remaining in one position for extended period of time. Encouraged pain and edema control with use of ice, elevation and activity pacing. Patient verbalized understanding.     Reviewed SNEHA precautions   Home going packet reviewed and provided to patient. Patient verbalized  understanding.      Outcome Measures:  [facility] Basic Mobility  Turning from your back to your side while in a flat bed without using bedrails: A lot  Moving from lying on your back to sitting on the side of a flat bed without using bedrails: A lot  Moving to and from bed to chair (including a wheelchair): A little  Standing up from a chair using your arms (e.g. wheelchair or bedside chair): A little  To walk in hospital room: A little  Climbing 3-5 steps with railing: A lot  Basic Mobility - Total Score: 15    Education Documentation  Handouts, taught by RAFAEL Zheng at 6/11/2024 12:03 PM.  Learner: Patient  Readiness: Eager  Method: Explanation, Handout  Response: Verbalizes Understanding, Demonstrated Understanding    Precautions, taught by RAFAEL Zheng at 6/11/2024 12:03 PM.  Learner: Patient  Readiness: Eager  Method: Explanation, Handout  Response: Verbalizes Understanding, Demonstrated Understanding    Body Mechanics, taught by RAFAEL Zheng at 6/11/2024 12:03 PM.  Learner: Patient  Readiness: Eager  Method: Explanation, Handout  Response: Verbalizes Understanding, Demonstrated Understanding    Home Exercise Program, taught by RAFAEL Zheng at 6/11/2024 12:03 PM.  Learner: Patient  Readiness: Eager  Method: Explanation, Handout  Response: Verbalizes Understanding, Demonstrated Understanding    Mobility Training, taught by RAFAEL Zheng at 6/11/2024 12:03 PM.  Learner: Patient  Readiness: Eager  Method: Explanation, Handout  Response: Verbalizes Understanding, Demonstrated Understanding    Education Comments  No comments found.        OP EDUCATION:       Encounter Problems       Encounter Problems (Active)       Mobility       Patient will demonstrate good understanding of bed mobility, transfers, ambulation, stair negotiation and HEP for safe home going.   (Progressing)       Start:  06/10/24    Expected End:  06/17/24

## 2024-06-11 NOTE — CARE PLAN
The patient's goals for the shift include pain control    The clinical goals for the shift include pain control      Problem: Pain  Goal: Takes deep breaths with improved pain control throughout the shift  Outcome: Progressing

## 2024-06-11 NOTE — PROGRESS NOTES
Occupational Therapy    Evaluation    Patient Name: Jojo Martin  MRN: [id number]  Today's Date: 6/11/2024  Time Calculation  Start Time: 0934  Stop Time: 1017  Time Calculation (min): 43 min    Assessment  IP OT Assessment  OT Assessment: Pt. presents with impaired ADL and mobility. Skilled OT services recommended to increase functional outcomes.  Prognosis: Good  Evaluation/Treatment Tolerance: Patient tolerated treatment well  Medical Staff Made Aware: Yes  End of Session Communication: Bedside nurse  End of Session Patient Position: Up in chair, Alarm off, not on at start of session  Plan:  Treatment Interventions: ADL retraining, Functional transfer training, UE strengthening/ROM, Endurance training, Equipment evaluation/education, Compensatory technique education  OT Frequency: 3 times per week  OT Discharge Recommendations: Moderate intensity level of continued care  Equipment Recommended upon Discharge: Wheeled walker  OT - OK to Discharge: Yes (Per OT POC)    Subjective     General:  General  Reason for Referral: Pt. is a 68 yr old female s/p R SNEHA 1 day. Pt. referred to OT for impaired ADL and mobility.  Past Medical History Relevant to Rehab: Anemia, HTN, Narcolepsy, mild Renal insufficiency, RLS, CKD Stage 3b, Thyroid Nodule, Reid OA Hips  Family/Caregiver Present: No  Prior to Session Communication: Bedside nurse  Patient Position Received: Up in chair, Alarm off, not on at start of session (Nurse notified)  General Comment: Pt. pleasant and cooperative to participate in evaluation.  Precautions:  LE Weight Bearing Status: Weight Bearing as Tolerated  Medical Precautions: Fall precautions (IV)  Post-Surgical Precautions: Right hip precautions (Anterior hip precautions)    Pain:  Pain Assessment  Pain Assessment: 0-10  Pain Score: 3  Pain Type: Surgical pain  Pain Location: Hip  Pain Orientation: Right  Pain Interventions: Cold applied, Repositioned  Response to Interventions: Pt. seated upright  comfortably in chair with ice applied to R hip.    Objective   Cognition:  Overall Cognitive Status: Within Functional Limits  Orientation Level: Oriented X4           Home Living:  Type of Home: Apartment  Lives With: Alone  Home Adaptive Equipment: Walker rolling or standard (Rollator)  Home Layout: One level  Home Access: Level entry, Elevator (Pt. reports that the elevator is now fixed and able to use to access apartment entry.)  Bathroom Shower/Tub: Tub/shower unit  Bathroom Toilet: Handicapped height  Bathroom Equipment: Grab bars in shower  Home Living Comments: Pt. reports that she lives alone, and has many modifications to her apartment to make homeliving more accessible.   Prior Function:  Level of [place]: Independent with homemaking with ambulation, Independent with ADLs and functional transfers  Receives Help From:  (Pt. reports that to don socks,  will often help her.)  ADL Assistance: Independent  Homemaking Assistance: Independent  Ambulatory Assistance: Independent  Prior Function Comments: Pt. reports that she completes most of her ADLs independently. Reports for about 1 monthe prior to surgery she has required assistance to don socks and occassional LE dressing. This assistance is not consistently available, as she relies on staff from her apartment prn.    ADL:  Eating Assistance: Independent  Grooming Assistance: Independent  Bathing Assistance: Moderate  Bathing Deficit: Supervision/safety, Increased time to complete   UE Dressing Assistance: Stand by  LE Dressing Assistance: Moderate  LE Dressing Deficit: Setup, Don/doff R sock, Don/doff L sock  Toileting Assistance with Device: Minimal  ADL Comments: Pt. demonstrated inability to position R surgical leg in figure 4 position to don socks,and required total assistance to don. Although, pt. did demonstrate ability to pull underwear and pants down, and then over hips while completing toilet hygiene. Additionally, pt. was educated on  use of adaptive equipment, like reacher and sock aid to assist with completion of ADLs.  Activity Tolerance:  Endurance: Tolerates less than 10 min exercise, no significant change in vital signs  Bed Mobility/Transfers: Bed Mobility  Bed Mobility: No (Pt. seated upright in chair)    Transfers  Transfer: Yes  Transfer 1  Transfer From 1: Chair with arms to  Transfer to 1: Toilet  Technique 1: Sit to stand, Stand to sit  Transfer Device 1: Walker (FWW)  Transfer Level of Assistance 1: Contact guard, Arm in arm assistance  Trials/Comments 1: Pt. demonstrated ability to perform transfer from chair to commode while also maintaining anterior hip precautions.  Transfers 2  Transfer From 2: Toilet to  Transfer to 2: Chair with arms  Technique 2: Sit to stand, Stand to sit  Transfer Device 2: Walker (FWW)  Transfer Level of Assistance 2: Contact guard, Arm in arm assistance      Functional Mobility:  Functional Mobility  Functional Mobility Performed: Yes  Functional Mobility 1  Surface 1: Level tile  Device 1: Rolling walker (FWW)  Assistance 1: Contact guard, Arm in arm assistance  Comments 1: Pt. demonstrated slow progression and small steps with functional mobility. Pt. able to complete functional mobility to perform toileting in bathroom.  Functional Mobility 2  Surface 2: Level tile  Device 2: Rolling walker (FWW)  Assistance 2: Contact guard, Arm in arm assistance  Comments 2: 2nd mobility trial, following seated rest: Pt. demonstrated ability to complete functional mobility to the sink for completion of hand hygiene, and then back to chair.  Sitting Balance:  Static Sitting Balance  Static Sitting-Balance Support: No upper extremity supported, Feet supported  Static Sitting-Level of Assistance: Close supervision  Standing Balance:  Dynamic Standing Balance  Dynamic Standing-Balance Support: No upper extremity supported  Dynamic Standing-Balance: Forward lean  Dynamic Standing-Comments: Pt. able to stand at sink to  perform hand hygiene with forward flexed posture for approximately 2-3 minutes. CGA provided to ensure pt. safety.    Sensation:  Light Touch: No apparent deficits  Strength:  Strength Comments: BUE functionally at baseline    Extremities: RUE   RUE : Within Functional Limits and LUE   LUE: Within Functional Limits    Outcome Measures: [facility] Daily Activity  Putting on and taking off regular lower body clothing: A lot  Bathing (including washing, rinsing, drying): A lot  Putting on and taking off regular upper body clothing: None  Toileting, which includes using toilet, bedpan or urinal: A little  Taking care of personal grooming such as brushing teeth: None  Eating Meals: None  Daily Activity - Total Score: 19      Education Documentation  Body Mechanics, taught by IRA Ho at 6/11/2024 10:46 AM.  Learner: Patient  Readiness: Acceptance  Method: Explanation  Response: Verbalizes Understanding    Precautions, taught by IRA Ho at 6/11/2024 10:46 AM.  Learner: Patient  Readiness: Acceptance  Method: Explanation  Response: Verbalizes Understanding      Goals:   Encounter Problems       Encounter Problems (Active)       OT Goals       ADL Routine (Progressing)       Start:  06/11/24    Expected End:  06/25/24       Pt. will demonstrate ability to perform LE ADL tasks using Soniya and LRD.          Functional Mobility (Progressing)       Start:  06/11/24    Expected End:  06/25/24       Pt. will perform functional mobility needed to complete ADL routine using Joana and LRD.          Functional Transfer (Progressing)       Start:  06/11/24    Expected End:  06/25/24       Pt. will demonstrate functional transfers using Joana for increased safety.          Balance/Activity Tolerance (Progressing)       Start:  06/11/24    Expected End:  06/25/24       Pt. will maintain good static/dynamic balance while standing EOB with Joana to increase activity tolerance with completion of dual-tasks.           Precautions (Progressing)       Start:  06/11/24    Expected End:  06/25/24       Pt. will demonstrate and verbalize hip precautions using adaptive tools necessary to complete functional tasks.

## 2024-06-11 NOTE — PROGRESS NOTES
06/11/24 1051   Discharge Planning   Living Arrangements Alone   Support Systems Family members;Friends/neighbors   Assistance Needed Patient is A&O X3, on room air (uses CPAP at HS), independent with ADLs and uses a cane and walker for ambulation. Patient was driving prior to surgery.   Type of Residence Private residence   Number of Stairs to Enter Residence 0   Number of Stairs Within Residence 0   Who is requesting discharge planning? Provider   Home or Post Acute Services Post acute facilities (Rehab/SNF/etc)   Type of Post Acute Facility Services Rehab   Patient expects to be discharged to: PT/OT evaluations completed and referral sent to [facility] via [facility] per patient's preference. Patient will require a pre-cert to transition to SNF.   Does the patient need discharge transport arranged? Yes   RoundTrip coordination needed? Yes   Has discharge transport been arranged? No

## 2024-06-11 NOTE — PROGRESS NOTES
"Jojo Martin is a 68 y.o. female on day 0 of admission presenting with Right hip pain.    Subjective   No acute overnight events.  Pain controlled.  Denies numbness and tingling.  Denies chest pain/shortness of breath.  Tolerating PO intake.         Objective     Physical Exam  Vitals reviewed.   HENT:      Head: Normocephalic and atraumatic.   Eyes:      Extraocular Movements: Extraocular movements intact.      Conjunctiva/sclera: Conjunctivae normal.      Pupils: Pupils are equal, round, and reactive to light.   Cardiovascular:      Rate and Rhythm: Normal rate and regular rhythm.      Pulses: Normal pulses.   Pulmonary:      Effort: Pulmonary effort is normal.   Abdominal:      Palpations: Abdomen is soft.   Musculoskeletal:      Comments: Right hip dressing C/D/I, no strikethrough, thigh and calf supple, leg and foot warm and well perfused, SILT S/S/SPN/DPN distributions   Skin:     General: Skin is warm and dry.      Capillary Refill: Capillary refill takes less than 2 seconds.   Neurological:      General: No focal deficit present.      Mental Status: She is alert and oriented to person, place, and time.       Last Recorded Vitals  Blood pressure 146/82, pulse 80, temperature 36.2 °C (97.2 °F), temperature source Temporal, resp. rate 16, height 1.575 m (5' 2\"), weight 104 kg (229 lb 4.5 oz), SpO2 95%.  Intake/Output last 3 Shifts:  I/O last 3 completed shifts:  In: 1695 (16.3 mL/kg) [I.V.:1695 (16.3 mL/kg)]  Out: 650 (6.3 mL/kg) [Urine:400 (0.1 mL/kg/hr); Blood:250]  Weight: 104 kg     Relevant Results  FL less than 1 hour    Result Date: 6/10/2024  These images are not reportable by radiology and will not be interpreted by  Radiologists.    XR pelvis 1-2 views    Result Date: 6/10/2024  Interpreted By:  Santos Subramanian, STUDY: XR PELVIS 1-2 VIEWS 6/10/2024 10:41 am   INDICATION: Signs/Symptoms:post op RTHA   COMPARISON: 06/04/2024   ACCESSION NUMBER(S): [id number]   ORDERING CLINICIAN: ANATOLIY ZHANG" TECHNIQUE: A single AP view of the pelvis was obtained.   FINDINGS: The patient is noted to status post right total hip arthroplasty. There is no evidence of acute fracture or dislocation identified.       1.  Postoperative changes status post right hip arthroplasty. 2. No evidence of hardware failure or acute fracture..   MACRO: None.     Signed by: Santos Subramanian 6/10/2024 11:46 AM Dictation workstation:   [id number]    XR lumbar spine 2-3 views    Result Date: 6/5/2024  Interpreted By:  Julio Kan, STUDY: XR LUMBAR SPINE 2-3 VIEWS; ;  6/4/2024 11:16 am   INDICATION: Signs/Symptoms:pre operative evaluation for spinal mobility.   COMPARISON: None.   ACCESSION NUMBER(S): [id number]   ORDERING CLINICIAN: JANA YOUNGBLOOD   FINDINGS: Lumbar Spine, two views   There is moderate disc space narrowing osteophytosis at L4-5 and L5-S1 along with facet disease. There is no fracture there is no spondylolisthesis. There is no change in dynamic views       Moderate spondylosis and facet disease lower lumbar spine without acute abnormality     MACRO: None   Signed by: Julio Kan 6/5/2024 9:00 PM Dictation workstation:   [id number]    XR hip right with pelvis when performed 2 or 3 views    Result Date: 6/5/2024  Interpreted By:  Julio Kan, STUDY: XR HIP RIGHT WITH PELVIS WHEN PERFORMED 2 OR 3 VIEWS; ;  6/4/2024 11:16 am   INDICATION: Signs/Symptoms:HIP PAIN.   COMPARISON: 03/05/2024   ACCESSION NUMBER(S): [id number]   ORDERING CLINICIAN: VIRGIE ZAVALA   FINDINGS: Right hip, three views   There is severe joint space narrowing with osteophytosis, sclerosis and subchondral formation in the right hip. There is severe pubic symphysis degenerative change. The left hip is unremarkable. No fracture or dislocation       Severe right hip osteoarthritis     MACRO: None   Signed by: Julio Kan 6/5/2024 8:52 PM Dictation workstation:   [id number]     Scheduled medications  acetaminophen, 650 mg, oral, q6h  [facility]  amLODIPine, 5 mg, oral, Nightly  amphetamine-dextroamphetamine, 10 mg, oral, BID  amphetamine-dextroamphetamine, 20 mg, oral, Daily  aspirin, 81 mg, oral, BID  PARoxetine, 20 mg, oral, Daily  pitolisant, 2 tablet, oral, q AM  polyethylene glycol, 17 g, oral, Daily  rOPINIRole, 2 mg, oral, Nightly      Continuous medications  lactated Ringer's, 100 mL/hr, Last Rate: 100 mL/hr (06/10/24 4867)  oxygen, 2 L/min  sodium chloride 0.9%, 100 mL/hr, Last Rate: Stopped (06/10/24 1029)      PRN medications  PRN medications: HYDROmorphone, naloxone, naloxone, ondansetron **OR** ondansetron, oxyCODONE, oxyCODONE, sennosides  Results for orders placed or performed during the hospital encounter of 06/10/24 (from the past 24 hour(s))   Basic metabolic panel   Result Value Ref Range    Glucose 110 (H) 74 - 99 mg/dL    Sodium 136 136 - 145 mmol/L    Potassium 4.5 3.5 - 5.3 mmol/L    Chloride 104 98 - 107 mmol/L    Bicarbonate 22 21 - 32 mmol/L    Anion Gap 15 10 - 20 mmol/L    Urea Nitrogen 35 (H) 6 - 23 mg/dL    Creatinine 1.18 (H) 0.50 - 1.05 mg/dL    eGFR 51 (L) >60 mL/min/1.73m*2    Calcium 8.2 (L) 8.6 - 10.3 mg/dL   CBC   Result Value Ref Range    WBC 7.1 4.4 - 11.3 x10*3/uL    nRBC 0.0 0.0 - 0.0 /100 WBCs    RBC 3.18 (L) 4.00 - 5.20 x10*6/uL    Hemoglobin 9.4 (L) 12.0 - 16.0 g/dL    Hematocrit 30.4 (L) 36.0 - 46.0 %    MCV 96 80 - 100 fL    MCH 29.6 26.0 - 34.0 pg    MCHC 30.9 (L) 32.0 - 36.0 g/dL    RDW 14.0 11.5 - 14.5 %    Platelets 192 150 - 450 x10*3/uL       Assessment/Plan   Active Problems:    Osteoarthritis of right hip, unspecified osteoarthritis type    68 year old female, POD 1 from right SNEHA  - AM labs reviewed: HgB 9.4 this AM (250 EBL), slight LASHELL - encourage PO intake - follow up with PCP to have labs rechecked  - WBAT RLE, pending PT evaluation  - DVT prophylaxis with Aspirin 81 mg BID, SCDs  - pain controlled on current regimen  - regular diet with BR  - will speak with Dr. Brock regarding patient's post  operative pain control (patient is concerned about receiving new medication as she does not want to be removed from pain management)  - patient will likely DC home later today versus rehab  - follow up with Dr. Corona as scheduled          I spent 30 minutes in the professional and overall care of this patient.      Halima Terrazas, SHAYLEE-CNP

## 2024-06-11 NOTE — CARE PLAN
The patient's goals for the shift include pain control    The clinical goals for the shift include pain control    Over the shift, the patient did  make progress toward the following goals. Barriers to progression include pain. Recommendations to address these barriers include pain medication and ice  Problem: Pain  Goal: Walks with improved pain control throughout the shift  Outcome: Progressing     Problem: Pain  Goal: Performs ADL's with improved pain control throughout shift  Outcome: Progressing   .

## 2024-06-11 NOTE — PROGRESS NOTES
Patient: Jojo Martin  Room/bed: 141/141-A  Admitted on: 6/10/2024    Age: 68 y.o.   Gender: female  Code Status:  Full Code   Admitting Dx: Right hip pain [M25.551]  Osteoarthritis of right hip, unspecified osteoarthritis type [M16.11]    MRN: [id number]  PCP: Ronel Greenfield DO       Subjective   Seen and examined in her room this AM. Awake and alert. No new complaints. Pain is manageable at rest. Poor sleep. She denies chest pain, breathing difficulties, abdominal pain, N/V/D/C, fever, or chills.      Objective    Physical Exam   Constitutional: A&O x 3; NAD; calm and cooperative  Eyes: EOM's intact  HEENT: Normocephalic, Atraumatic. Oral mucosa moist.   Neck: Supple. No JVD, lymphadenopathy.   Lungs: CTAB with fair air movement. Respirations even and unlabored on room air.   Heart: RRR  Abdomen: Soft, non-tender, non-distended, +BS  MS/Extremities: FERRER x 4 with RLE pain/weakness from surgery. No edema. Peripheral pulses intact bilaterally.   Neuro: A&O x3; no focal deficits; gross motor and sensation intact.   Skin: Warm and dry. No rashes or lesions  Psych: Normal affect.      Temp:  [36 °C (96.8 °F)-36.6 °C (97.9 °F)] 36.2 °C (97.2 °F)  Heart Rate:  [66-97] 80  Resp:  [16] 16  BP: ()/(52-82) 146/82    Vitals:    06/10/24 0603   Weight: 104 kg (229 lb 4.5 oz)             I/Os    Intake/Output Summary (Last 24 hours) at 6/11/2024 1414  Last data filed at 6/11/2024 0845  Gross per 24 hour   Intake 941.66 ml   Output 1000 ml   Net -58.34 ml       Labs:   Results from last 72 hours   Lab Units 06/11/24  0441   SODIUM mmol/L 136   POTASSIUM mmol/L 4.5   CHLORIDE mmol/L 104   CO2 mmol/L 22   BUN mg/dL 35*   CREATININE mg/dL 1.18*   GLUCOSE mg/dL 110*   CALCIUM mg/dL 8.2*   ANION GAP mmol/L 15   EGFR mL/min/1.73m*2 51*      Results from last 72 hours   Lab Units 06/11/24  0441   WBC AUTO x10*3/uL 7.1   HEMOGLOBIN g/dL 9.4*   HEMATOCRIT % 30.4*   PLATELETS AUTO x10*3/uL 192      Lab Results   Component  "Value Date    CALCIUM 8.2 (L) 06/11/2024      No results found for: \"CRP\"   [unfilled]     Micro/ID:   Susceptibility data from last 90 days.  Collected Specimen Info Organism   05/23/24 Swab from Nares/Axilla/Groin Methicillin Susceptible Staphylococcus aureus (MSSA)                    No lab exists for component: \"AGALPCRNB\"   .ID  No results found for: \"URINECULTURE\", \"BLOODCULT\", \"CSFCULTSMEAR\"    Images:  FL less than 1 hour  These images are not reportable by radiology and will not be interpreted   by  Radiologists.  XR pelvis 1-2 views  Narrative: Interpreted By:  Santos Subramanian,   STUDY:  XR PELVIS 1-2 VIEWS 6/10/2024 10:41 am      INDICATION:  Signs/Symptoms:post op RTHA      COMPARISON:  06/04/2024      ACCESSION NUMBER(S):  [id number]      ORDERING CLINICIAN:  ANATOLIY ZHANG      TECHNIQUE:  A single AP view of the pelvis was obtained.      FINDINGS:  The patient is noted to status post right total hip arthroplasty.  There is no evidence of acute fracture or dislocation identified.      Impression: 1.  Postoperative changes status post right hip arthroplasty.  2. No evidence of hardware failure or acute fracture..      MACRO:  None.          Signed by: Santos Subramanian 6/10/2024 11:46 AM  Dictation workstation:   [id number]       Meds    Scheduled medications  acetaminophen, 650 mg, oral, q6h YUDELKA  amLODIPine, 5 mg, oral, Nightly  amphetamine-dextroamphetamine, 10 mg, oral, BID  amphetamine-dextroamphetamine, 20 mg, oral, Daily  aspirin, 81 mg, oral, BID  PARoxetine, 20 mg, oral, Daily  pitolisant, 2 tablet, oral, q AM  polyethylene glycol, 17 g, oral, Daily  rOPINIRole, 2 mg, oral, Nightly      Continuous medications  oxygen, 2 L/min  sodium chloride 0.9%, 100 mL/hr, Last Rate: Stopped (06/10/24 1029)      PRN medications  PRN medications: HYDROmorphone, naloxone, naloxone, ondansetron **OR** ondansetron, oxyCODONE, oxyCODONE, sennosides     Assessment and Plan    Jojo Martin is a 68 y.o. female " admitted by ortho service following elective total hip arthroplasty. Medicine consulted for general medical management.     Hypertension  -Home regimen resumed: Amlodipine  -BP has been stable.     Right Hip Osteoarthritis  -S/P right SNEHA by Dr. Corona on 6/10/24.  -Incisional care, antibiotics, activity restrictions per orthopedics surgery.  -PT/OT to follow. WBAT.   -Medicate for pain  -Maintain bowel regimen  -Advised IS use, mobilization.   -Monitor H&H for ABLA. Hgb stable at 9.4.   -DVT prophylaxis per surgery: ASA 81mg BID.  -Afebrile. No leukocytosis.     Anxiety  -Mood is stable on Paxil.     ADHD/Narcolepsy  -On Adderal and Pitolisant     Fluids/Electrolytes/Nutrition  -Laboratory data reviewed.   -Electrolytes stable.   -No nutritional concerns at this time.      Disposition  -Plan of care discussed with medicine attending, Dr. De Jesus and Orthopedic APRN.   -Medically stable for discharge to SNF vs. Home.       Jaymie Swift, APRN-CNP

## 2024-06-11 NOTE — PROGRESS NOTES
06/11/24 1500   Discharge Planning   Patient expects to be discharged to: Pre-cert initiated for [facility] by the  direct pre-cert team. Facility made aware via [facility].   Does the patient need discharge transport arranged? Yes   RoundTrip coordination needed? Yes   Has discharge transport been arranged? No

## 2024-06-12 VITALS
BODY MASS INDEX: 42.19 KG/M2 | HEIGHT: 62 IN | TEMPERATURE: 98.4 F | WEIGHT: 229.28 LBS | OXYGEN SATURATION: 97 % | SYSTOLIC BLOOD PRESSURE: 149 MMHG | HEART RATE: 83 BPM | RESPIRATION RATE: 18 BRPM | DIASTOLIC BLOOD PRESSURE: 74 MMHG

## 2024-06-12 PROBLEM — M16.11 OSTEOARTHRITIS OF RIGHT HIP, UNSPECIFIED OSTEOARTHRITIS TYPE: Status: RESOLVED | Noted: 2024-06-10 | Resolved: 2024-06-12

## 2024-06-12 LAB
ALBUMIN SERPL BCP-MCNC: 3.8 G/DL (ref 3.4–5)
ANION GAP SERPL CALC-SCNC: 14 MMOL/L (ref 10–20)
BUN SERPL-MCNC: 27 MG/DL (ref 6–23)
CALCIUM SERPL-MCNC: 8.6 MG/DL (ref 8.6–10.3)
CHLORIDE SERPL-SCNC: 101 MMOL/L (ref 98–107)
CO2 SERPL-SCNC: 25 MMOL/L (ref 21–32)
CREAT SERPL-MCNC: 0.99 MG/DL (ref 0.5–1.05)
EGFRCR SERPLBLD CKD-EPI 2021: 63 ML/MIN/1.73M*2
ERYTHROCYTE [DISTWIDTH] IN BLOOD BY AUTOMATED COUNT: 14.1 % (ref 11.5–14.5)
GLUCOSE SERPL-MCNC: 172 MG/DL (ref 74–99)
HCT VFR BLD AUTO: 31.7 % (ref 36–46)
HGB BLD-MCNC: 10 G/DL (ref 12–16)
MCH RBC QN AUTO: 29.7 PG (ref 26–34)
MCHC RBC AUTO-ENTMCNC: 31.5 G/DL (ref 32–36)
MCV RBC AUTO: 94 FL (ref 80–100)
NRBC BLD-RTO: 0 /100 WBCS (ref 0–0)
PHOSPHATE SERPL-MCNC: 2.6 MG/DL (ref 2.5–4.9)
PLATELET # BLD AUTO: 214 X10*3/UL (ref 150–450)
POTASSIUM SERPL-SCNC: 4.8 MMOL/L (ref 3.5–5.3)
RBC # BLD AUTO: 3.37 X10*6/UL (ref 4–5.2)
SODIUM SERPL-SCNC: 135 MMOL/L (ref 136–145)
WBC # BLD AUTO: 8.2 X10*3/UL (ref 4.4–11.3)

## 2024-06-12 PROCEDURE — 97116 GAIT TRAINING THERAPY: CPT | Mod: CQ,GP

## 2024-06-12 PROCEDURE — 85027 COMPLETE CBC AUTOMATED: CPT | Performed by: NURSE PRACTITIONER

## 2024-06-12 PROCEDURE — 97110 THERAPEUTIC EXERCISES: CPT | Mod: CQ,GP

## 2024-06-12 PROCEDURE — 2500000001 HC RX 250 WO HCPCS SELF ADMINISTERED DRUGS (ALT 637 FOR MEDICARE OP): Performed by: NURSE PRACTITIONER

## 2024-06-12 PROCEDURE — 97535 SELF CARE MNGMENT TRAINING: CPT | Mod: GO,CO

## 2024-06-12 PROCEDURE — 84100 ASSAY OF PHOSPHORUS: CPT | Performed by: NURSE PRACTITIONER

## 2024-06-12 PROCEDURE — 2500000004 HC RX 250 GENERAL PHARMACY W/ HCPCS (ALT 636 FOR OP/ED): Performed by: NURSE PRACTITIONER

## 2024-06-12 PROCEDURE — 97530 THERAPEUTIC ACTIVITIES: CPT | Mod: GO,CO

## 2024-06-12 PROCEDURE — 36415 COLL VENOUS BLD VENIPUNCTURE: CPT | Performed by: NURSE PRACTITIONER

## 2024-06-12 PROCEDURE — 7100000011 HC EXTENDED STAY RECOVERY HOURLY - NURSING UNIT

## 2024-06-12 PROCEDURE — 99232 SBSQ HOSP IP/OBS MODERATE 35: CPT | Performed by: STUDENT IN AN ORGANIZED HEALTH CARE EDUCATION/TRAINING PROGRAM

## 2024-06-12 PROCEDURE — 2500000002 HC RX 250 W HCPCS SELF ADMINISTERED DRUGS (ALT 637 FOR MEDICARE OP, ALT 636 FOR OP/ED): Mod: MUE | Performed by: NURSE PRACTITIONER

## 2024-06-12 RX ORDER — ROPINIROLE 1 MG/1
1 TABLET, FILM COATED ORAL NIGHTLY
Start: 2024-06-12

## 2024-06-12 RX ORDER — OXYCODONE AND ACETAMINOPHEN 5; 325 MG/1; MG/1
1 TABLET ORAL EVERY 4 HOURS PRN
Qty: 18 TABLET | Refills: 0 | Status: SHIPPED | OUTPATIENT
Start: 2024-06-12 | End: 2024-06-15

## 2024-06-12 RX ADMIN — OXYCODONE HYDROCHLORIDE 10 MG: 5 TABLET ORAL at 13:25

## 2024-06-12 RX ADMIN — HYDROMORPHONE HYDROCHLORIDE 0.2 MG: 1 INJECTION, SOLUTION INTRAMUSCULAR; INTRAVENOUS; SUBCUTANEOUS at 08:18

## 2024-06-12 RX ADMIN — OXYCODONE HYDROCHLORIDE 10 MG: 5 TABLET ORAL at 09:24

## 2024-06-12 RX ADMIN — OXYCODONE HYDROCHLORIDE 10 MG: 5 TABLET ORAL at 05:15

## 2024-06-12 RX ADMIN — ACETAMINOPHEN 650 MG: 325 TABLET ORAL at 05:15

## 2024-06-12 RX ADMIN — ASPIRIN 81 MG: 81 TABLET, COATED ORAL at 08:10

## 2024-06-12 RX ADMIN — POLYETHYLENE GLYCOL 3350 17 G: 17 POWDER, FOR SOLUTION ORAL at 08:10

## 2024-06-12 RX ADMIN — ACETAMINOPHEN 650 MG: 325 TABLET ORAL at 11:14

## 2024-06-12 RX ADMIN — PAROXETINE 20 MG: 20 TABLET, FILM COATED ORAL at 08:10

## 2024-06-12 ASSESSMENT — COGNITIVE AND FUNCTIONAL STATUS - GENERAL
CLIMB 3 TO 5 STEPS WITH RAILING: A LOT
MOVING FROM LYING ON BACK TO SITTING ON SIDE OF FLAT BED WITH BEDRAILS: A LITTLE
STANDING UP FROM CHAIR USING ARMS: A LITTLE
DAILY ACTIVITIY SCORE: 23
CLIMB 3 TO 5 STEPS WITH RAILING: A LITTLE
STANDING UP FROM CHAIR USING ARMS: A LITTLE
MOBILITY SCORE: 16
MOVING TO AND FROM BED TO CHAIR: A LITTLE
WALKING IN HOSPITAL ROOM: A LITTLE
MOBILITY SCORE: 20
TOILETING: A LITTLE
DAILY ACTIVITIY SCORE: 20
TURNING FROM BACK TO SIDE WHILE IN FLAT BAD: A LOT
DRESSING REGULAR LOWER BODY CLOTHING: A LITTLE
HELP NEEDED FOR BATHING: A LITTLE
WALKING IN HOSPITAL ROOM: A LITTLE
DRESSING REGULAR LOWER BODY CLOTHING: A LOT
MOVING TO AND FROM BED TO CHAIR: A LITTLE

## 2024-06-12 ASSESSMENT — PAIN DESCRIPTION - ORIENTATION: ORIENTATION: RIGHT

## 2024-06-12 ASSESSMENT — PAIN SCALES - GENERAL
PAINLEVEL_OUTOF10: 8
PAINLEVEL_OUTOF10: 0 - NO PAIN
PAINLEVEL_OUTOF10: 3
PAINLEVEL_OUTOF10: 8

## 2024-06-12 ASSESSMENT — PAIN - FUNCTIONAL ASSESSMENT
PAIN_FUNCTIONAL_ASSESSMENT: 0-10

## 2024-06-12 ASSESSMENT — PAIN DESCRIPTION - LOCATION: LOCATION: HIP

## 2024-06-12 ASSESSMENT — ACTIVITIES OF DAILY LIVING (ADL): HOME_MANAGEMENT_TIME_ENTRY: 13

## 2024-06-12 NOTE — PROGRESS NOTES
06/12/24 1144   Discharge Planning   Patient expects to be discharged to: [facility] transport confirmed for 1:15 PM to [facility]. Patient is aware. Bedside nurse updated on transport time and provided with facility number for nurse to nurse report.   Does the patient need discharge transport arranged? Yes   RoundTrip coordination needed? Yes   Has discharge transport been arranged? Yes   What day is the transport expected? 06/12/24   What time is the transport expected? 3689

## 2024-06-12 NOTE — PROGRESS NOTES
Physical Therapy    Physical Therapy Treatment    Patient Name: Jojo Martin  MRN: [id number]  Today's Date: 6/12/2024  Time Calculation  Start Time: 0949  Stop Time: 1015  Time Calculation (min): 26 min    Assessment/Plan   PT Assessment  PT Assessment Results: Decreased strength, Decreased range of motion, Decreased endurance, Impaired balance, Decreased mobility  Rehab Prognosis: Good  Medical Staff Made Aware: Yes  End of Session Communication: Bedside nurse  Assessment Comment: Patient tolerated treatment well. Education provided on anterior THR precautions, posture improvement, and FWW proximity, patient demos good carry over. Mod intensity  End of Session Patient Position: Up in chair, Alarm off, not on at start of session     PT Plan  Treatment/Interventions: Transfer training, Gait training, Strengthening, Endurance training, Range of motion, Bed mobility  PT Plan: Skilled PT  PT Frequency: 3 times per week  PT Discharge Recommendations: Moderate intensity level of continued care  Equipment Recommended upon Discharge: Wheeled walker  PT Recommended Transfer Status: Assist x1  PT - OK to Discharge: Yes (Per PT POC)    General Visit Information:   PT  Visit  PT Received On: 06/12/24  General  Reason for Referral: Impaired mobility, R SNEHA  Referred By: David Corona  Past Medical History Relevant to Rehab: Anemia, HTN, Narcolepsy, mild Renal insufficiency, RLS, CKD Stage 3b, Thyroid Nodule, Reid OA Hips  Prior to Session Communication: Bedside nurse  Patient Position Received: Up in chair, Alarm off, not on at start of session  General Comment: Patient pleasant and agreeable to therapy session. Upon arrival patient OOB in chair and eager to participate in treatment.    Subjective   Precautions:  Precautions  LE Weight Bearing Status: Weight Bearing as Tolerated  Medical Precautions: Fall precautions  Post-Surgical Precautions: Right hip precautions (Anterior)  Vital Signs:     Objective   Pain:  Pain  Assessment  Pain Assessment: 0-10  Pain Score: 8  Pain Type: Surgical pain  Pain Location: Hip  Pain Orientation: Right  Clinical Progression: Gradually improving  Pain Interventions: Repositioned, Ambulation/increased activity  Response to Interventions: Post treatment patient reports her pain has drastically decreased.  Cognition:  Cognition  Overall Cognitive Status: Within Functional Limits  Orientation Level: Oriented X4  Coordination:     Postural Control:  Static Sitting Balance  Static Sitting-Balance Support: No upper extremity supported, Feet supported  Static Sitting-Level of Assistance: Close supervision  Static Standing Balance  Static Standing-Balance Support: Bilateral upper extremity supported  Static Standing-Level of Assistance: Contact guard  Static Standing-Comment/Number of Minutes: 2  Extremity/Trunk Assessments:    Activity Tolerance:     Treatments:  Therapeutic Exercise  Therapeutic Exercise Performed: Yes  Therapeutic Exercise Activity 1: Seated in recliner PF/DF, quad sets, and glute squeezes 3 second holds x15 each                 Bed Mobility  Bed Mobility: No  Ambulation/Gait Training  Ambulation/Gait Training Performed: Yes  Ambulation/Gait Training 1  Surface 1: Level tile  Device 1: Rolling walker  Assistance 1: Contact guard  Quality of Gait 1: Wide base of support, Decreased step length, Forward flexed posture  Comments/Distance (ft) 1: x15ft (Verbal cues for posture correction anf FWW proximity)  Transfers  Transfer: Yes  Transfer 1  Transfer From 1: Chair with arms to, Stand to  Transfer to 1: Stand, Sit  Technique 1: Sit to stand, Stand to sit  Transfer Device 1: Walker  Transfer Level of Assistance 1: Contact guard  Trials/Comments 1: x3. Patient demos good carry over with Anterior THR precautions and technique of moving from sit to stand and stand to sit position.                          Outcome Measures:  [facility] Basic Mobility  Turning from your back to your side while in a  flat bed without using bedrails: A little  Moving from lying on your back to sitting on the side of a flat bed without using bedrails: A lot  Moving to and from bed to chair (including a wheelchair): A little  Standing up from a chair using your arms (e.g. wheelchair or bedside chair): A little  To walk in hospital room: A little  Climbing 3-5 steps with railing: A lot  Basic Mobility - Total Score: 16    Education Documentation  ADL Training, taught by Emely Guajardo PTA at 6/12/2024 10:39 AM.  Learner: Patient  Readiness: Eager  Method: Explanation, Demonstration, Handout  Response: Verbalizes Understanding, Demonstrated Understanding    Body Mechanics, taught by Emely Guajardo PTA at 6/12/2024 10:39 AM.  Learner: Patient  Readiness: Eager  Method: Explanation, Demonstration, Handout  Response: Verbalizes Understanding, Demonstrated Understanding    Precautions, taught by Emely Guajardo PTA at 6/12/2024 10:39 AM.  Learner: Patient  Readiness: Eager  Method: Explanation, Demonstration, Handout  Response: Verbalizes Understanding, Demonstrated Understanding    Handouts, taught by Emely Guajardo PTA at 6/12/2024 10:39 AM.  Learner: Patient  Readiness: Eager  Method: Explanation, Demonstration, Handout  Response: Verbalizes Understanding, Demonstrated Understanding    Precautions, taught by Emely Guajardo PTA at 6/12/2024 10:39 AM.  Learner: Patient  Readiness: Eager  Method: Explanation, Demonstration, Handout  Response: Verbalizes Understanding, Demonstrated Understanding    Body Mechanics, taught by Emely Guajardo PTA at 6/12/2024 10:39 AM.  Learner: Patient  Readiness: Eager  Method: Explanation, Demonstration, Handout  Response: Verbalizes Understanding, Demonstrated Understanding    Home Exercise Program, taught by Emely Guajardo PTA at 6/12/2024 10:39 AM.  Learner: Patient  Readiness: Eager  Method: Explanation, Demonstration, Handout  Response: Verbalizes Understanding, Demonstrated Understanding    Mobility  Training, taught by Emely Guajardo PTA at 6/12/2024 10:39 AM.  Learner: Patient  Readiness: Eager  Method: Explanation, Demonstration, Handout  Response: Verbalizes Understanding, Demonstrated Understanding    Education Comments  No comments found.        OP EDUCATION:       Encounter Problems       Encounter Problems (Active)       Mobility       Patient will demonstrate good understanding of bed mobility, transfers, ambulation, stair negotiation and HEP for safe home going.   (Progressing)       Start:  06/10/24    Expected End:  06/17/24

## 2024-06-12 NOTE — PROGRESS NOTES
06/12/24 0845   Discharge Planning   Living Arrangements Alone   Support Systems Family members;Friends/neighbors   Assistance Needed Patient is A&O X3, on room air (uses CPAP at HS), independent with ADLs and uses a cane and walker for ambulation. Patient was driving prior to surgery.   Type of Residence Private residence   Number of Stairs to Enter Residence 0   Number of Stairs Within Residence 0   Who is requesting discharge planning? Provider   Home or Post Acute Services Post acute facilities (Rehab/SNF/etc)   Type of Post Acute Facility Services Rehab   Patient expects to be discharged to: Pre-cert obtained for [facility]. Ortho CNP made aware. Awaiting final orders for SNF DC (skilled level of care less than 30 days good rehab potential) and for Dr. Corona to sign correa avtar. Once completed transport can be arranged for patient to DC to SNF.   Does the patient need discharge transport arranged? Yes   RoundTrip coordination needed? Yes   Has discharge transport been arranged? No

## 2024-06-12 NOTE — PROGRESS NOTES
Patient: Jojo Martin  Room/bed: 141/141-A  Admitted on: 6/10/2024    Age: 68 y.o.   Gender: female  Code Status:  Full Code   Admitting Dx: Right hip pain [M25.551]  Osteoarthritis of right hip, unspecified osteoarthritis type [M16.11]    MRN: [id number]  PCP: Ronel Greenfield DO       Subjective   Seen and examined in her room this AM. Awake and alert. No new complaints. Pain worse with movement. She denies chest pain, breathing difficulties, abdominal pain, N/V/D/C, fever, or chills.      Objective    Physical Exam   Constitutional: A&O x 3; NAD; calm and cooperative  Eyes: EOM's intact  HEENT: Normocephalic, Atraumatic. Oral mucosa moist.   Neck: Supple. No JVD, lymphadenopathy.   Lungs: CTAB with fair air movement. Respirations even and unlabored on room air.   Heart: RRR  Abdomen: Soft, non-tender, non-distended, +BS  MS/Extremities: FERRER x 4 with RLE pain/weakness from surgery. No edema. Peripheral pulses intact bilaterally.   Neuro: A&O x3; no focal deficits; gross motor and sensation intact.   Skin: Warm and dry. No rashes or lesions  Psych: Normal affect.      Temp:  [36.2 °C (97.2 °F)-36.9 °C (98.4 °F)] 36.9 °C (98.4 °F)  Heart Rate:  [83-89] 83  Resp:  [18] 18  BP: (103-149)/(53-76) 149/74    Vitals:    06/10/24 0603   Weight: 104 kg (229 lb 4.5 oz)             I/Os    Intake/Output Summary (Last 24 hours) at 6/12/2024 1247  Last data filed at 6/12/2024 0944  Gross per 24 hour   Intake 480 ml   Output --   Net 480 ml       Labs:   Results from last 72 hours   Lab Units 06/12/24  0943 06/11/24  0441   SODIUM mmol/L 135* 136   POTASSIUM mmol/L 4.8 4.5   CHLORIDE mmol/L 101 104   CO2 mmol/L 25 22   BUN mg/dL 27* 35*   CREATININE mg/dL 0.99 1.18*   GLUCOSE mg/dL 172* 110*   CALCIUM mg/dL 8.6 8.2*   ANION GAP mmol/L 14 15   EGFR mL/min/1.73m*2 63 51*   PHOSPHORUS mg/dL 2.6  --       Results from last 72 hours   Lab Units 06/12/24  0943 06/11/24  0441   WBC AUTO x10*3/uL 8.2 7.1   HEMOGLOBIN g/dL 10.0*  "9.4*   HEMATOCRIT % 31.7* 30.4*   PLATELETS AUTO x10*3/uL 214 192      Lab Results   Component Value Date    CALCIUM 8.6 06/12/2024    PHOS 2.6 06/12/2024      No results found for: \"CRP\"   [unfilled]     Micro/ID:   Susceptibility data from last 90 days.  Collected Specimen Info Organism   05/23/24 Swab from Nares/Axilla/Groin Methicillin Susceptible Staphylococcus aureus (MSSA)                    No lab exists for component: \"AGALPCRNB\"   .ID  No results found for: \"URINECULTURE\", \"BLOODCULT\", \"CSFCULTSMEAR\"    Images:  FL less than 1 hour  These images are not reportable by radiology and will not be interpreted   by  Radiologists.  XR pelvis 1-2 views  Narrative: Interpreted By:  Santos Subramanian,   STUDY:  XR PELVIS 1-2 VIEWS 6/10/2024 10:41 am      INDICATION:  Signs/Symptoms:post op RTHA      COMPARISON:  06/04/2024      ACCESSION NUMBER(S):  [id number]      ORDERING CLINICIAN:  ANATOLIY ZHANG      TECHNIQUE:  A single AP view of the pelvis was obtained.      FINDINGS:  The patient is noted to status post right total hip arthroplasty.  There is no evidence of acute fracture or dislocation identified.      Impression: 1.  Postoperative changes status post right hip arthroplasty.  2. No evidence of hardware failure or acute fracture..      MACRO:  None.          Signed by: Santos Subramanian 6/10/2024 11:46 AM  Dictation workstation:   [id number]       Meds    Scheduled medications  acetaminophen, 650 mg, oral, q6h YUDELKA  amLODIPine, 5 mg, oral, Nightly  amphetamine-dextroamphetamine, 10 mg, oral, BID  amphetamine-dextroamphetamine, 20 mg, oral, Daily  aspirin, 81 mg, oral, BID  PARoxetine, 20 mg, oral, Daily  pitolisant, 2 tablet, oral, q AM  polyethylene glycol, 17 g, oral, Daily  rOPINIRole, 1 mg, oral, Nightly      Continuous medications  oxygen, 2 L/min  sodium chloride 0.9%, 100 mL/hr, Last Rate: Stopped (06/10/24 1029)      PRN medications  PRN medications: HYDROmorphone, naloxone, naloxone, ondansetron " **OR** ondansetron, oxyCODONE, oxyCODONE, sennosides     Assessment and Plan    Jojo Martin is a 68 y.o. female admitted by ortho service following elective total hip arthroplasty. Medicine consulted for general medical management.     Hypertension  -Home regimen resumed: Amlodipine  -BP has been stable postoperatively.     Right Hip Osteoarthritis  -S/P right SNEHA by Dr. Corona on 6/10/24.  -Incisional care, antibiotics, activity restrictions per orthopedics surgery.  -PT/OT to follow. WBAT.   -Medicate for pain  -Maintain bowel regimen  -Advised IS use, mobilization.   -Monitor H&H for ABLA. Hgb stable at 10.0.   -DVT prophylaxis per surgery: ASA 81mg BID.  -Afebrile. No leukocytosis.     Anxiety  -Mood is stable on Paxil.     ADHD/Narcolepsy  -On Adderal and Pitolisant     Fluids/Electrolytes/Nutrition  -Laboratory data reviewed.   -Electrolytes stable. Hyperglycemia.   -No nutritional concerns at this time.      Disposition  -Plan of care discussed with medicine attending, Dr. De Jesus and Orthopedic APRN.   -Medically stable for discharge to SNF.       Jaymie Swift, APRN-CNP

## 2024-06-12 NOTE — PROGRESS NOTES
"Jojo Martin is a 68 y.o. female on day 1 of admission presenting with Right hip pain.    Subjective   No acute events overnight. VSS. Pain is controlled on current regimen, encouraged to keep working with PT.  PT recommendation was moderate intensity level PT, pending pre-cert to rehab facility.         Objective     Physical Exam  Constitutional:       General: She is not in acute distress.     Appearance: She is obese.   HENT:      Head: Normocephalic and atraumatic.      Mouth/Throat:      Mouth: Mucous membranes are moist.   Eyes:      Extraocular Movements: Extraocular movements intact.      Pupils: Pupils are equal, round, and reactive to light.   Cardiovascular:      Rate and Rhythm: Normal rate and regular rhythm.   Pulmonary:      Effort: Pulmonary effort is normal.   Abdominal:      General: There is no distension.   Musculoskeletal:      Cervical back: Neck supple.      Comments:  Right hip dressing C/D/I, no strikethrough, thigh and calf supple, leg and foot warm and well perfused, SILT S/S/SPN/DPN distributions    Skin:     General: Skin is warm and dry.      Capillary Refill: Capillary refill takes less than 2 seconds.   Neurological:      General: No focal deficit present.      Mental Status: She is alert and oriented to person, place, and time.   Psychiatric:         Mood and Affect: Mood normal.         Behavior: Behavior normal.       Last Recorded Vitals  Blood pressure 149/74, pulse 83, temperature 36.9 °C (98.4 °F), temperature source Temporal, resp. rate 18, height 1.575 m (5' 2\"), weight 104 kg (229 lb 4.5 oz), SpO2 97%.  Intake/Output last 3 Shifts:  I/O last 3 completed shifts:  In: 720 (6.9 mL/kg) [P.O.:720]  Out: 1000 (9.6 mL/kg) [Urine:1000 (0.3 mL/kg/hr)]  Weight: 104 kg     Relevant Results    FL less than 1 hour    Result Date: 6/10/2024  These images are not reportable by radiology and will not be interpreted by  Radiologists.    XR pelvis 1-2 views    Result Date: " 6/10/2024  Interpreted By:  Santos Subramanian, STUDY: XR PELVIS 1-2 VIEWS 6/10/2024 10:41 am   INDICATION: Signs/Symptoms:post op RTHA   COMPARISON: 06/04/2024   ACCESSION NUMBER(S): [id number]   ORDERING CLINICIAN: ANATOLIY ZHANG   TECHNIQUE: A single AP view of the pelvis was obtained.   FINDINGS: The patient is noted to status post right total hip arthroplasty. There is no evidence of acute fracture or dislocation identified.       1.  Postoperative changes status post right hip arthroplasty. 2. No evidence of hardware failure or acute fracture..   MACRO: None.     Signed by: Santos Subramanian 6/10/2024 11:46 AM Dictation workstation:   [id number]    XR lumbar spine 2-3 views    Result Date: 6/5/2024  Interpreted By:  Julio Kan, STUDY: XR LUMBAR SPINE 2-3 VIEWS; ;  6/4/2024 11:16 am   INDICATION: Signs/Symptoms:pre operative evaluation for spinal mobility.   COMPARISON: None.   ACCESSION NUMBER(S): [id number]   ORDERING CLINICIAN: JANA YOUNGBLOOD   FINDINGS: Lumbar Spine, two views   There is moderate disc space narrowing osteophytosis at L4-5 and L5-S1 along with facet disease. There is no fracture there is no spondylolisthesis. There is no change in dynamic views       Moderate spondylosis and facet disease lower lumbar spine without acute abnormality     MACRO: None   Signed by: Julio Kan 6/5/2024 9:00 PM Dictation workstation:   [id number]    XR hip right with pelvis when performed 2 or 3 views    Result Date: 6/5/2024  Interpreted By:  Julio Kan, STUDY: XR HIP RIGHT WITH PELVIS WHEN PERFORMED 2 OR 3 VIEWS; ;  6/4/2024 11:16 am   INDICATION: Signs/Symptoms:HIP PAIN.   COMPARISON: 03/05/2024   ACCESSION NUMBER(S): [id number]   ORDERING CLINICIAN: VIRGIE ZAVALA   FINDINGS: Right hip, three views   There is severe joint space narrowing with osteophytosis, sclerosis and subchondral formation in the right hip. There is severe pubic symphysis degenerative change. The left hip is unremarkable.  No fracture or dislocation       Severe right hip osteoarthritis     MACRO: None   Signed by: Julio Kan 6/5/2024 8:52 PM Dictation workstation:   [id number]      Scheduled medications  acetaminophen, 650 mg, oral, q6h YUDEKLA  amLODIPine, 5 mg, oral, Nightly  amphetamine-dextroamphetamine, 10 mg, oral, BID  amphetamine-dextroamphetamine, 20 mg, oral, Daily  aspirin, 81 mg, oral, BID  PARoxetine, 20 mg, oral, Daily  pitolisant, 2 tablet, oral, q AM  polyethylene glycol, 17 g, oral, Daily  rOPINIRole, 1 mg, oral, Nightly      Continuous medications  oxygen, 2 L/min  sodium chloride 0.9%, 100 mL/hr, Last Rate: Stopped (06/10/24 1029)      PRN medications  PRN medications: HYDROmorphone, naloxone, naloxone, ondansetron **OR** ondansetron, oxyCODONE, oxyCODONE, sennosides  Results for orders placed or performed during the hospital encounter of 06/10/24 (from the past 24 hour(s))   CBC   Result Value Ref Range    WBC 8.2 4.4 - 11.3 x10*3/uL    nRBC 0.0 0.0 - 0.0 /100 WBCs    RBC 3.37 (L) 4.00 - 5.20 x10*6/uL    Hemoglobin 10.0 (L) 12.0 - 16.0 g/dL    Hematocrit 31.7 (L) 36.0 - 46.0 %    MCV 94 80 - 100 fL    MCH 29.7 26.0 - 34.0 pg    MCHC 31.5 (L) 32.0 - 36.0 g/dL    RDW 14.1 11.5 - 14.5 %    Platelets 214 150 - 450 x10*3/uL              Assessment/Plan   Active Problems:  There are no active Hospital Problems.    68 year old female, POD 2 from right SNEHA    - AM labs pending (Renal panel pending), Hgb improved to 10.0 form 9.4.  - WBAT RLE, PT recommendation skilled PT for moderate intensity level of continued care.   - DVT prophylaxis with Aspirin 81 mg BID, SCDs  - pain controlled on current regimen  - regular diet with BR  - will speak with Dr. Brock regarding patient's post operative pain control (patient is concerned about receiving new medication as she does not want to be removed from pain management) - multiple attempts failed to reach doctor yesterday 6/11  - DC to SNF, care coordination following, Pending  pre-cert.   - follow up with Dr. Corona as scheduled     I have reviewed the above documentation and have performed an independent exam.  I have spent 30 minutes reviewing chart and with direct patient care.    SHAYLEE Gan-CNP  SAEED TONG PA-S

## 2024-06-12 NOTE — CARE PLAN
The patient's goals for the shift include pain control    The clinical goals for the shift include recognizing her accountability in effectively managing patients' pain through assessment      Problem: Pain  Goal: Takes deep breaths with improved pain control throughout the shift  Outcome: Progressing  Goal: Turns in bed with improved pain control throughout the shift  Outcome: Progressing  Goal: Walks with improved pain control throughout the shift  Outcome: Progressing  Goal: Performs ADL's with improved pain control throughout shift  Outcome: Progressing  Goal: Participates in PT with improved pain control throughout the shift  Outcome: Progressing  Goal: Free from opioid side effects throughout the shift  Outcome: Progressing  Goal: Free from acute confusion related to pain meds throughout the shift  Outcome: Progressing

## 2024-06-12 NOTE — PROGRESS NOTES
Occupational Therapy    Occupational Therapy Treatment    Name: Jojo Martin  MRN: [id number]  : 1955  Date: 24  Time Calculation  Start Time: 921  Stop Time: 945  Time Calculation (min): 24 min    Assessment:  OT Assessment: Pt. presents with impaired ADL and mobility. Skilled OT services recommended to increase functional outcomes.  Prognosis: Good  Barriers to Discharge: None  Evaluation/Treatment Tolerance: Patient tolerated treatment well  Medical Staff Made Aware: Yes  End of Session Communication: Bedside nurse, Care Coordinator (Discussed level of assist needed in tasks and discharge planning needs.)  End of Session Patient Position: Up in chair, Alarm off, not on at start of session (Alarm not needed per nurse.)  Plan:  Treatment Interventions: ADL retraining, Functional transfer training, UE strengthening/ROM, Endurance training, Equipment evaluation/education, Compensatory technique education  OT Frequency: 3 times per week  OT Discharge Recommendations: Moderate intensity level of continued care continues to be appropriate as pt is not at prior baseline of independent function and is not yet safe to reassume independent self-care and IADL with currently established supports in her home environment.  Equipment Recommended upon Discharge: Wheeled walker  OT Recommended Transfer Status: Assist of 1  OT - OK to Discharge: Yes (In accord with OT POC)    Subjective   Previous Visit Info:  OT Last Visit  OT Received On: 24  General:  General  Reason for Referral: Impaired mobility, R SNEHA  Referred By: David Corona  Past Medical History Relevant to Rehab: Anemia, HTN, Narcolepsy, mild Renal insufficiency, RLS, CKD Stage 3b, Thyroid Nodule, Reid OA Hips  Family/Caregiver Present: No  Prior to Session Communication: Bedside nurse  Patient Position Received: Up in chair, Alarm off, not on at start of session  Preferred Learning Style: verbal, written, visual  General Comment: Pt pleasant  "and cooperative, eager to engage in treatment.  Precautions:  LE Weight Bearing Status: Weight Bearing as Tolerated  Medical Precautions: Fall precautions  Post-Surgical Precautions: Right hip precautions (Anterior)  Vitals:     Pain Assessment:  Pain Assessment  Pain Assessment: 0-10  Pain Score: 8  Pain Type: Surgical pain  Pain Location: Hip  Pain Orientation: Right  Clinical Progression: Gradually improving  Pain Interventions: Repositioned, Ambulation/increased activity (Medicated by nurse in course of session)  Response to Interventions: Pt reports pain to be, \"just about the same\"     Objective   Cognition:  Overall Cognitive Status: Within Functional Limits  Orientation Level: Oriented X4  Activities of Daily Living: LE Dressing  LE Dressing: Yes  LE Dressing Adaptive Equipment: Reacher, Sock aide, Dressing stick (Plastic bag)  Sock Level of Assistance: Minimum assistance  Compression Hose Level of Assistance: Moderate assistance  LE Dressing Where Assessed: Chair  LE Dressing Comments: Increased time for task with simplified single step instruction.     Bed Mobility/Transfers: Transfers  Transfer: Yes  Transfer 1  Transfer From 1: Chair with arms to  Transfer to 1: Stand, Sit  Technique 1: Sit to stand, Stand to sit  Transfer Device 1: Walker  Transfer Level of Assistance 1: Contact guard  Trials/Comments 1: x2 including clothing management for pulling stockings to hip level. Pt fully compliant with anterior THR precautions.    Outcome Measures:  [facility] Daily Activity  Putting on and taking off regular lower body clothing: A lot  Bathing (including washing, rinsing, drying): A little  Putting on and taking off regular upper body clothing: None  Toileting, which includes using toilet, bedpan or urinal: A little  Taking care of personal grooming such as brushing teeth: None  Eating Meals: None  Daily Activity - Total Score: 20    Education Documentation  ADL Training, taught by Roldan Ferro, OT at 6/12/2024 " 10:07 AM.  Learner: Patient  Readiness: Acceptance  Method: Explanation, Demonstration, Handout  Response: Verbalizes Understanding, Demonstrated Understanding, Needs Reinforcement  Comment: Pt reports forgetfulness, continued education needed to insure compliance to precautions and safety with mobility, selfcare and IADL activities.    Body Mechanics, taught by Roldan Ferro, OT at 6/12/2024 10:07 AM.  Learner: Patient  Readiness: Acceptance  Method: Explanation, Demonstration, Handout  Response: Verbalizes Understanding, Demonstrated Understanding, Needs Reinforcement  Comment: Pt reports forgetfulness, continued education needed to insure compliance to precautions and safety with mobility, selfcare and IADL activities.    Precautions, taught by Roldan Ferro OT at 6/12/2024 10:07 AM.  Learner: Patient  Readiness: Acceptance  Method: Explanation, Demonstration, Handout  Response: Verbalizes Understanding, Demonstrated Understanding, Needs Reinforcement  Comment: Pt reports forgetfulness, continued education needed to insure compliance to precautions and safety with mobility, selfcare and IADL activities.    Education Comments  Continued education including rehearsal of anterior hip precautions, adaptive techniques and related safety components indicated d/t impaired memory for functional uptake of instruction.      Goals:  Encounter Problems       Encounter Problems (Active)       OT Goals       ADL Routine (Progressing)       Start:  06/11/24    Expected End:  06/25/24       Pt. will demonstrate ability to perform LE ADL tasks using Soniya and LRD.          Functional Mobility (Progressing)       Start:  06/11/24    Expected End:  06/25/24       Pt. will perform functional mobility needed to complete ADL routine using Joana and LRD.          Functional Transfer (Progressing)       Start:  06/11/24    Expected End:  06/25/24       Pt. will demonstrate functional transfers using Joana for increased safety.           Balance/Activity Tolerance (Progressing)       Start:  06/11/24    Expected End:  06/25/24       Pt. will maintain good static/dynamic balance while standing EOB with Joana to increase activity tolerance with completion of dual-tasks.          Precautions (Progressing)       Start:  06/11/24    Expected End:  06/25/24       Pt. will demonstrate and verbalize hip precautions using adaptive tools necessary to complete functional tasks.

## 2024-06-12 NOTE — CARE PLAN
Problem: Pain  Goal: Takes deep breaths with improved pain control throughout the shift  Outcome: Progressing  Goal: Turns in bed with improved pain control throughout the shift  Outcome: Progressing  Goal: Walks with improved pain control throughout the shift  Outcome: Progressing  Goal: Performs ADL's with improved pain control throughout shift  Outcome: Progressing  Goal: Participates in PT with improved pain control throughout the shift  Outcome: Progressing  Goal: Free from opioid side effects throughout the shift  Outcome: Progressing  Goal: Free from acute confusion related to pain meds throughout the shift  Outcome: Progressing   The patient's goals for the shift include pain control    The clinical goals for the shift include pain control    Over the shift, the patient did not make progress toward the following goals. Barriers to progression include pain. Recommendations to address these barriers include pain meds, repositioning, and rest.

## 2024-06-13 ENCOUNTER — TELEPHONE (OUTPATIENT)
Dept: PRIMARY CARE | Facility: CLINIC | Age: 69
End: 2024-06-13
Payer: MEDICARE

## 2024-06-13 NOTE — TELEPHONE ENCOUNTER
----- Message from Ronel Greenfield DO sent at 6/10/2024  2:02 PM EDT -----  She has severe oa of this hi[. See orthopedics

## 2024-06-21 LAB
LABORATORY COMMENT REPORT: NORMAL
PATH REPORT.FINAL DX SPEC: NORMAL
PATH REPORT.GROSS SPEC: NORMAL
PATH REPORT.RELEVANT HX SPEC: NORMAL
PATH REPORT.TOTAL CANCER: NORMAL

## 2024-06-25 ENCOUNTER — APPOINTMENT (OUTPATIENT)
Dept: ORTHOPEDIC SURGERY | Facility: CLINIC | Age: 69
End: 2024-06-25
Payer: MEDICARE

## 2024-06-28 ENCOUNTER — DOCUMENTATION (OUTPATIENT)
Dept: HOME HEALTH SERVICES | Facility: HOME HEALTH | Age: 69
End: 2024-06-28

## 2024-06-28 ENCOUNTER — HOSPITAL ENCOUNTER (OUTPATIENT)
Dept: RADIOLOGY | Facility: HOSPITAL | Age: 69
Discharge: HOME | End: 2024-06-28
Payer: MEDICARE

## 2024-06-28 ENCOUNTER — HOME HEALTH ADMISSION (OUTPATIENT)
Dept: HOME HEALTH SERVICES | Facility: HOME HEALTH | Age: 69
End: 2024-06-28
Payer: MEDICARE

## 2024-06-28 ENCOUNTER — APPOINTMENT (OUTPATIENT)
Dept: ORTHOPEDIC SURGERY | Facility: CLINIC | Age: 69
End: 2024-06-28
Payer: MEDICARE

## 2024-06-28 DIAGNOSIS — Z96.641 STATUS POST RIGHT HIP REPLACEMENT: Primary | ICD-10-CM

## 2024-06-28 DIAGNOSIS — M25.551 PAIN OF RIGHT HIP: ICD-10-CM

## 2024-06-28 PROCEDURE — 1160F RVW MEDS BY RX/DR IN RCRD: CPT | Performed by: ORTHOPAEDIC SURGERY

## 2024-06-28 PROCEDURE — 73502 X-RAY EXAM HIP UNI 2-3 VIEWS: CPT | Mod: RT

## 2024-06-28 PROCEDURE — 1157F ADVNC CARE PLAN IN RCRD: CPT | Performed by: ORTHOPAEDIC SURGERY

## 2024-06-28 PROCEDURE — 1036F TOBACCO NON-USER: CPT | Performed by: ORTHOPAEDIC SURGERY

## 2024-06-28 PROCEDURE — 99024 POSTOP FOLLOW-UP VISIT: CPT | Performed by: ORTHOPAEDIC SURGERY

## 2024-06-28 PROCEDURE — 1159F MED LIST DOCD IN RCRD: CPT | Performed by: ORTHOPAEDIC SURGERY

## 2024-06-28 NOTE — HH CARE COORDINATION
Home Care received a Referral for Physical Therapy. We have processed the referral for a Start of Care on 6/30/24.     If you have any questions or concerns, please feel free to contact us at [phone]. Follow the prompts, enter your five digit zip code, and you will be directed to your care team on [facility].

## 2024-06-28 NOTE — PROGRESS NOTES
Chief Complaint   Patient presents with    Right Hip - Post-op     6/10/24 RT SNEHA         This is a 68 y.o. female who is 2 weeks out from right total hip.  Pain is under control with current medications.  No drainage from her incision no fevers or chills.  Progressing well with physical therapy and appropriately improving in function.  No other new issues or symptoms.    Right hip examination: Surgical incision well approximated no erythema no drainage.  no pain with range of motion neurovascular tact distally    X-rays of the hip were reviewed independently interpreted by me today, the show stable total hip arthroplasty no fracture dislocation or loosening    Impression plan: 68 y.o. female 2 weeks out from right total hip.  We discussed continuing physical therapy and home exercise program. Pain medications to be used as needed. Assistive devices as needed per PT. We discussed DVT prophylaxis until 6 weeks. No dentist until 3 months and thereafter dental prophylaxis for life. Activity as tolerated weightbearing as tolerated, hip precautions until 3 months. I have personally reviewed the OARRS report for the patient. This report is scanned into the electronic medical record. I have considered the risks of abuse, dependence, addiction and diversion. Follow up 4 weeks with xrays

## 2024-07-01 ENCOUNTER — TELEPHONE (OUTPATIENT)
Dept: ORTHOPEDIC SURGERY | Facility: CLINIC | Age: 69
End: 2024-07-01

## 2024-07-01 ENCOUNTER — HOME CARE VISIT (OUTPATIENT)
Dept: HOME HEALTH SERVICES | Facility: HOME HEALTH | Age: 69
End: 2024-07-01
Payer: MEDICARE

## 2024-07-01 VITALS
TEMPERATURE: 97.6 F | BODY MASS INDEX: 42.51 KG/M2 | DIASTOLIC BLOOD PRESSURE: 71 MMHG | WEIGHT: 231 LBS | OXYGEN SATURATION: 98 % | SYSTOLIC BLOOD PRESSURE: 128 MMHG | HEART RATE: 75 BPM | RESPIRATION RATE: 18 BRPM | HEIGHT: 62 IN

## 2024-07-01 DIAGNOSIS — Z96.641 STATUS POST RIGHT HIP REPLACEMENT: Primary | ICD-10-CM

## 2024-07-01 PROCEDURE — G0151 HHCP-SERV OF PT,EA 15 MIN: HCPCS | Mod: HHH

## 2024-07-01 PROCEDURE — 169592 NO-PAY CLAIM PROCEDURE

## 2024-07-01 RX ORDER — DOXYCYCLINE 100 MG/1
100 CAPSULE ORAL 2 TIMES DAILY
Qty: 20 CAPSULE | Refills: 0 | Status: SHIPPED | OUTPATIENT
Start: 2024-07-01 | End: 2024-07-11

## 2024-07-01 ASSESSMENT — ENCOUNTER SYMPTOMS
HYPERTENSION: 1
PERSON REPORTING PAIN: PATIENT
PAIN SEVERITY GOAL: 0/10
LOWER EXTREMITY EDEMA: 1
OCCASIONAL FEELINGS OF UNSTEADINESS: 1
HIGHEST PAIN SEVERITY IN PAST 24 HOURS: 7/10
SUBJECTIVE PAIN PROGRESSION: UNCHANGED
LOWEST PAIN SEVERITY IN PAST 24 HOURS: 7/10
PAIN LOCATION: RIGHT HIP

## 2024-07-01 ASSESSMENT — ACTIVITIES OF DAILY LIVING (ADL)
ENTERING_EXITING_HOME: CONTACT GUARD ASSIST
AMBULATION_DISTANCE/DURATION_TOLERATED: 50
OASIS_M1830: 05
AMBULATION ASSISTANCE ON FLAT SURFACES: 1

## 2024-07-01 NOTE — TELEPHONE ENCOUNTER
I was notified by Heather Christensen PT that Jojo Martin has erythema around the superior corner of hip incision s/p right total hip replacement. There is also erythema under abdominal folds. She sent a picture and uploaded it on Epic as well.     Plan: I have ordered doxycycline to her pharmacy and she should follow up around a week to examine how the incision and skin looks after prophylactic antibiotics

## 2024-07-01 NOTE — TELEPHONE ENCOUNTER
Pt wanted to know if the incision should just be covered or just have the ointment on the incision from after surgery pt would like a call back at [phone]

## 2024-07-01 NOTE — CASE COMMUNICATION
patient seen s/p hospital stay and SNF stay. Pt is a 68 year old female who underwent a right total hip arthroplasty. pt admitted for home care and PT services to return pt to [facility] and develop and implement a safe and appropraite hep

## 2024-07-02 ENCOUNTER — TELEPHONE (OUTPATIENT)
Dept: PRIMARY CARE | Facility: CLINIC | Age: 69
End: 2024-07-02

## 2024-07-02 DIAGNOSIS — Z96.641 STATUS POST RIGHT HIP REPLACEMENT: Primary | ICD-10-CM

## 2024-07-02 NOTE — TELEPHONE ENCOUNTER
6/28/24 pov 6/10/24 rt heavenly  Patient called and wants to know if Dr. Corona can put in a script for Safety Rail and a back bench. Told patient we will call her to let her know once I hear back from you.    [facility] [place].

## 2024-07-02 NOTE — TELEPHONE ENCOUNTER
Transition of Care    Inpatient facility: [facility]  Discharge diagnosis: HIP REPLACEMENT  Discharged to: HOME  Discharge date: 6/27/24  Initial Call date: 7/2/24  Spoke with patient/caregiver: SPOKE WITH BEBO  Do you need assistance  visits prior to your PCP visit: No  Home health care ordered: Yes  Have you been contacted by home care and have a start of care date: Yes-  Home Health  Are you taking medications as prescribed at discharge: Yes  Patient advised to bring all medications to PCP follow-up appointment.  Patient advised to follow discharge instructions until provider follow-up.  TCM visit date: 7/10/24  TCM provider visit with: DR. ZAVALA

## 2024-07-03 ENCOUNTER — HOME CARE VISIT (OUTPATIENT)
Dept: HOME HEALTH SERVICES | Facility: HOME HEALTH | Age: 69
End: 2024-07-03
Payer: MEDICARE

## 2024-07-03 DIAGNOSIS — Z96.641 HISTORY OF TOTAL RIGHT HIP ARTHROPLASTY: ICD-10-CM

## 2024-07-03 PROCEDURE — G0157 HHC PT ASSISTANT EA 15: HCPCS | Mod: CQ,HHH

## 2024-07-03 ASSESSMENT — PAIN SCALES - PAIN ASSESSMENT IN ADVANCED DEMENTIA (PAINAD)
CONSOLABILITY: 0
BREATHING: 0
BODYLANGUAGE: 0
TOTALSCORE: 0
BODYLANGUAGE: 0 - RELAXED.
FACIALEXPRESSION: 0
NEGVOCALIZATION: 0
NEGVOCALIZATION: 0 - NONE.
CONSOLABILITY: 0 - NO NEED TO CONSOLE.
FACIALEXPRESSION: 0 - SMILING OR INEXPRESSIVE.

## 2024-07-03 ASSESSMENT — ENCOUNTER SYMPTOMS
SUBJECTIVE PAIN PROGRESSION: WAXING AND WANING
PAIN SEVERITY GOAL: 0/10
PAIN LOCATION - PAIN SEVERITY: 2/10
LOWEST PAIN SEVERITY IN PAST 24 HOURS: 0/10
PAIN: 1
PAIN LOCATION: RIGHT HIP
PERSON REPORTING PAIN: PATIENT
HIGHEST PAIN SEVERITY IN PAST 24 HOURS: 7/10

## 2024-07-05 ENCOUNTER — HOME CARE VISIT (OUTPATIENT)
Dept: HOME HEALTH SERVICES | Facility: HOME HEALTH | Age: 69
End: 2024-07-05
Payer: MEDICARE

## 2024-07-05 PROCEDURE — G0152 HHCP-SERV OF OT,EA 15 MIN: HCPCS | Mod: HHH

## 2024-07-05 ASSESSMENT — ENCOUNTER SYMPTOMS
PERSON REPORTING PAIN: PATIENT
PAIN SEVERITY GOAL: 0/10
PAIN LOCATION - PAIN SEVERITY: 5/10
PAIN LOCATION - PAIN QUALITY: ACHY
PAIN LOCATION: RIGHT HIP
LOWEST PAIN SEVERITY IN PAST 24 HOURS: 0/10
PAIN: 1
SUBJECTIVE PAIN PROGRESSION: GRADUALLY IMPROVING
NAUSEA: 1
PAIN LOCATION - PAIN FREQUENCY: INTERMITTENT
HIGHEST PAIN SEVERITY IN PAST 24 HOURS: 8/10

## 2024-07-06 ENCOUNTER — HOME CARE VISIT (OUTPATIENT)
Dept: HOME HEALTH SERVICES | Facility: HOME HEALTH | Age: 69
End: 2024-07-06
Payer: MEDICARE

## 2024-07-08 ENCOUNTER — HOME CARE VISIT (OUTPATIENT)
Dept: HOME HEALTH SERVICES | Facility: HOME HEALTH | Age: 69
End: 2024-07-08
Payer: MEDICARE

## 2024-07-08 VITALS
RESPIRATION RATE: 18 BRPM | SYSTOLIC BLOOD PRESSURE: 124 MMHG | HEART RATE: 94 BPM | DIASTOLIC BLOOD PRESSURE: 76 MMHG | TEMPERATURE: 96.4 F | OXYGEN SATURATION: 99 %

## 2024-07-08 PROCEDURE — G0157 HHC PT ASSISTANT EA 15: HCPCS | Mod: CQ,HHH

## 2024-07-08 ASSESSMENT — PAIN SCALES - PAIN ASSESSMENT IN ADVANCED DEMENTIA (PAINAD)
NEGVOCALIZATION: 0 - NONE.
TOTALSCORE: 0
BREATHING: 0
CONSOLABILITY: 0
BODYLANGUAGE: 0 - RELAXED.
BODYLANGUAGE: 0
FACIALEXPRESSION: 0
CONSOLABILITY: 0 - NO NEED TO CONSOLE.
FACIALEXPRESSION: 0 - SMILING OR INEXPRESSIVE.
NEGVOCALIZATION: 0

## 2024-07-08 ASSESSMENT — ENCOUNTER SYMPTOMS
PERSON REPORTING PAIN: PATIENT
PAIN LOCATION: RIGHT HIP
PAIN SEVERITY GOAL: 0/10
HIGHEST PAIN SEVERITY IN PAST 24 HOURS: 5/10
DENIES PAIN: 1
LOWEST PAIN SEVERITY IN PAST 24 HOURS: 0/10
SUBJECTIVE PAIN PROGRESSION: GRADUALLY IMPROVING

## 2024-07-10 ENCOUNTER — APPOINTMENT (OUTPATIENT)
Dept: PRIMARY CARE | Facility: CLINIC | Age: 69
End: 2024-07-10
Payer: MEDICARE

## 2024-07-10 VITALS
HEART RATE: 74 BPM | SYSTOLIC BLOOD PRESSURE: 120 MMHG | BODY MASS INDEX: 41.34 KG/M2 | TEMPERATURE: 96.6 F | DIASTOLIC BLOOD PRESSURE: 80 MMHG | OXYGEN SATURATION: 96 % | WEIGHT: 226 LBS

## 2024-07-10 DIAGNOSIS — Z96.641 STATUS POST TOTAL REPLACEMENT OF RIGHT HIP: Primary | ICD-10-CM

## 2024-07-10 PROCEDURE — 1036F TOBACCO NON-USER: CPT | Performed by: FAMILY MEDICINE

## 2024-07-10 PROCEDURE — 1157F ADVNC CARE PLAN IN RCRD: CPT | Performed by: FAMILY MEDICINE

## 2024-07-10 PROCEDURE — 1159F MED LIST DOCD IN RCRD: CPT | Performed by: FAMILY MEDICINE

## 2024-07-10 PROCEDURE — 3074F SYST BP LT 130 MM HG: CPT | Performed by: FAMILY MEDICINE

## 2024-07-10 PROCEDURE — 3079F DIAST BP 80-89 MM HG: CPT | Performed by: FAMILY MEDICINE

## 2024-07-10 PROCEDURE — 99214 OFFICE O/P EST MOD 30 MIN: CPT | Performed by: FAMILY MEDICINE

## 2024-07-10 ASSESSMENT — ENCOUNTER SYMPTOMS
FLANK PAIN: 0
BLOOD IN STOOL: 0
JOINT SWELLING: 0
PALPITATIONS: 0
VOMITING: 0
UNEXPECTED WEIGHT CHANGE: 0
NERVOUS/ANXIOUS: 0
SHORTNESS OF BREATH: 0
LIGHT-HEADEDNESS: 0
NAUSEA: 0
WHEEZING: 0
HEADACHES: 0
DYSURIA: 0
EYE DISCHARGE: 0
DIZZINESS: 0
APPETITE CHANGE: 0
DIARRHEA: 0
ARTHRALGIAS: 1
SORE THROAT: 0
CONSTIPATION: 0
ACTIVITY CHANGE: 0
COUGH: 0
DYSPHORIC MOOD: 0
HEMATURIA: 0
MYALGIAS: 0
NUMBNESS: 0
SLEEP DISTURBANCE: 0
EYE ITCHING: 0
FEVER: 0
ABDOMINAL PAIN: 0
RHINORRHEA: 0
SINUS PRESSURE: 0
WEAKNESS: 0

## 2024-07-10 NOTE — PATIENT INSTRUCTIONS
FOLLOW LIKE PLANNED FOR WELLNESS AND BLOOD WORK  FOLLOW WITH THE SURGEON LIKE PLANNED /DR YOUNGBLOOD THE 16TH   SOONER WHEN NEEDED

## 2024-07-10 NOTE — PROGRESS NOTES
Subjective   Patient ID: Jojo Martin is a 68 y.o. female who presents for Hospital Follow-up (Post R hip surgery with Dr. Corona-6/10/24).    HPI DOING WELL ONE MONTH POST OP TOTAL HIP     Review of Systems   Constitutional:  Negative for activity change, appetite change, fever and unexpected weight change.   HENT:  Negative for congestion, ear pain, postnasal drip, rhinorrhea, sinus pressure and sore throat.    Eyes:  Negative for discharge, itching and visual disturbance.   Respiratory:  Negative for cough, shortness of breath and wheezing.    Cardiovascular:  Negative for chest pain, palpitations and leg swelling.   Gastrointestinal:  Negative for abdominal pain, blood in stool, constipation, diarrhea, nausea and vomiting.   Endocrine: Negative for cold intolerance, heat intolerance and polyuria.   Genitourinary:  Negative for dysuria, flank pain and hematuria.   Musculoskeletal:  Positive for arthralgias. Negative for gait problem, joint swelling and myalgias.        S/P TOTAL HIP RIGHT    Skin:  Negative for rash.   Allergic/Immunologic: Negative for environmental allergies and food allergies.   Neurological:  Negative for dizziness, syncope, weakness, light-headedness, numbness and headaches.   Psychiatric/Behavioral:  Negative for dysphoric mood and sleep disturbance. The patient is not nervous/anxious.        Objective   /80   Pulse 74   Temp 35.9 °C (96.6 °F)   Wt 103 kg (226 lb)   SpO2 96%   BMI 41.34 kg/m²     Physical Exam  Vitals and nursing note reviewed.   Constitutional:       Appearance: Normal appearance.   HENT:      Head: Normocephalic.   Cardiovascular:      Rate and Rhythm: Normal rate and regular rhythm.      Pulses: Normal pulses.      Heart sounds: Normal heart sounds. No murmur heard.     No friction rub. No gallop.   Pulmonary:      Effort: Pulmonary effort is normal. No respiratory distress.      Breath sounds: Normal breath sounds. No wheezing.   Abdominal:      General:  Bowel sounds are normal. There is no distension.      Palpations: Abdomen is soft.      Tenderness: There is no abdominal tenderness.   Musculoskeletal:         General: No deformity. Normal range of motion.      Comments: USING WALKER AND STILL GETTING PHYSICAL THERAPY    Skin:     General: Skin is warm and dry.      Capillary Refill: Capillary refill takes less than 2 seconds.   Neurological:      General: No focal deficit present.      Mental Status: She is alert and oriented to person, place, and time.   Psychiatric:         Mood and Affect: Mood normal.         Assessment/Plan   Problem List Items Addressed This Visit    None  Visit Diagnoses         Codes    Status post total replacement of right hip    -  Primary Z96.641

## 2024-07-11 ENCOUNTER — TELEPHONE (OUTPATIENT)
Dept: ORTHOPEDIC SURGERY | Facility: CLINIC | Age: 69
End: 2024-07-11
Payer: MEDICARE

## 2024-07-11 ENCOUNTER — HOME CARE VISIT (OUTPATIENT)
Dept: HOME HEALTH SERVICES | Facility: HOME HEALTH | Age: 69
End: 2024-07-11
Payer: MEDICARE

## 2024-07-11 NOTE — TELEPHONE ENCOUNTER
Patient called she had surgery on 6/10/24 LT SNEHA she is having cramping in her hip area and wanted to be seen as soon as she can, we put her in with emily hutchins PA-C to check on her Lt hip, she having no fever or chills just a cramping in her hip area

## 2024-07-12 ENCOUNTER — TELEPHONE (OUTPATIENT)
Dept: ORTHOPEDIC SURGERY | Facility: CLINIC | Age: 69
End: 2024-07-12

## 2024-07-12 ENCOUNTER — LAB (OUTPATIENT)
Dept: LAB | Facility: LAB | Age: 69
End: 2024-07-12
Payer: MEDICARE

## 2024-07-12 ENCOUNTER — HOSPITAL ENCOUNTER (OUTPATIENT)
Dept: RADIOLOGY | Facility: HOSPITAL | Age: 69
Discharge: HOME | End: 2024-07-12
Payer: MEDICARE

## 2024-07-12 ENCOUNTER — OFFICE VISIT (OUTPATIENT)
Dept: ORTHOPEDIC SURGERY | Facility: CLINIC | Age: 69
End: 2024-07-12
Payer: MEDICARE

## 2024-07-12 VITALS — HEIGHT: 62 IN | BODY MASS INDEX: 41.59 KG/M2 | WEIGHT: 226 LBS

## 2024-07-12 DIAGNOSIS — Z96.641 HISTORY OF TOTAL RIGHT HIP ARTHROPLASTY: ICD-10-CM

## 2024-07-12 DIAGNOSIS — M62.838 MUSCLE SPASM: ICD-10-CM

## 2024-07-12 DIAGNOSIS — Z96.641 HISTORY OF TOTAL RIGHT HIP ARTHROPLASTY: Primary | ICD-10-CM

## 2024-07-12 LAB
CRP SERPL-MCNC: 0.53 MG/DL
ERYTHROCYTE [DISTWIDTH] IN BLOOD BY AUTOMATED COUNT: 14 % (ref 11.5–14.5)
ERYTHROCYTE [SEDIMENTATION RATE] IN BLOOD BY WESTERGREN METHOD: 19 MM/H (ref 0–30)
HCT VFR BLD AUTO: 35.1 % (ref 36–46)
HGB BLD-MCNC: 11 G/DL (ref 12–16)
MCH RBC QN AUTO: 29.3 PG (ref 26–34)
MCHC RBC AUTO-ENTMCNC: 31.3 G/DL (ref 32–36)
MCV RBC AUTO: 93 FL (ref 80–100)
NRBC BLD-RTO: 0 /100 WBCS (ref 0–0)
PLATELET # BLD AUTO: 213 X10*3/UL (ref 150–450)
RBC # BLD AUTO: 3.76 X10*6/UL (ref 4–5.2)
WBC # BLD AUTO: 5.7 X10*3/UL (ref 4.4–11.3)

## 2024-07-12 PROCEDURE — 1036F TOBACCO NON-USER: CPT

## 2024-07-12 PROCEDURE — 1160F RVW MEDS BY RX/DR IN RCRD: CPT

## 2024-07-12 PROCEDURE — 1125F AMNT PAIN NOTED PAIN PRSNT: CPT

## 2024-07-12 PROCEDURE — 85652 RBC SED RATE AUTOMATED: CPT

## 2024-07-12 PROCEDURE — 1157F ADVNC CARE PLAN IN RCRD: CPT

## 2024-07-12 PROCEDURE — 99024 POSTOP FOLLOW-UP VISIT: CPT

## 2024-07-12 PROCEDURE — 1159F MED LIST DOCD IN RCRD: CPT

## 2024-07-12 PROCEDURE — 36415 COLL VENOUS BLD VENIPUNCTURE: CPT

## 2024-07-12 PROCEDURE — 86140 C-REACTIVE PROTEIN: CPT

## 2024-07-12 PROCEDURE — 85027 COMPLETE CBC AUTOMATED: CPT

## 2024-07-12 PROCEDURE — 73503 X-RAY EXAM HIP UNI 4/> VIEWS: CPT | Mod: RT

## 2024-07-12 ASSESSMENT — PAIN - FUNCTIONAL ASSESSMENT: PAIN_FUNCTIONAL_ASSESSMENT: 0-10

## 2024-07-12 ASSESSMENT — PAIN SCALES - GENERAL: PAINLEVEL_OUTOF10: 9

## 2024-07-12 NOTE — PROGRESS NOTES
History of Present Illness  Chief Complaint   Patient presents with    Right Hip - Post-op, Pain     6/10/24 RT SNEHA having pain worse now    Patient returns today as she is having a lot of pain over the last two days. Denies doing anything she shouldn't be doing.  Prescribed Robaxin and Norco for pain and spasm from pain management yesterday, but hasn't started taking yet. Denies any new neuro deficits. No fever or drainage.     Review of Systems   GENERAL: Negative for malaise, significant weight loss, fever  MUSCULOSKELETAL: see HPI  NEURO:  Negative     Examination  side: right hip  Healthy incision without erythema, warmth, or drainage.   No tenderness about incision.  Tenderness anterior, groin area.  Pain with passive flexion of the hip.  Sensation intact distal to incision  Patient in wheelchair in office.  X-rays taken today were reviewed by myself and show no acute fracture or dislocation.  No hardware complications.     Assessment:  Patient status post side: right total hip arthroplasty     Plan  Patient to start taking the muscle relaxant and pain medication as prescribed by pain management.  Labs ordered to assess for infection. (CBC, SHARRI, CRP).  Will call once resulted.  Follow-up: next Tuesday to review labs and to reassess.    Zahra Hussein PA-C  07/12/24

## 2024-07-12 NOTE — TELEPHONE ENCOUNTER
Patient will come in early for her appt today to get xrays and see Zahra Hussein PA-C  she knows that she is to get xrays first

## 2024-07-13 ENCOUNTER — HOME CARE VISIT (OUTPATIENT)
Dept: HOME HEALTH SERVICES | Facility: HOME HEALTH | Age: 69
End: 2024-07-13
Payer: MEDICARE

## 2024-07-15 ENCOUNTER — TELEPHONE (OUTPATIENT)
Dept: ORTHOPEDIC SURGERY | Facility: CLINIC | Age: 69
End: 2024-07-15
Payer: MEDICARE

## 2024-07-15 ENCOUNTER — HOME CARE VISIT (OUTPATIENT)
Dept: HOME HEALTH SERVICES | Facility: HOME HEALTH | Age: 69
End: 2024-07-15
Payer: MEDICARE

## 2024-07-15 NOTE — TELEPHONE ENCOUNTER
[facility]  Insurance is giving a hard time  regarding the safety rail. They need more description on a tub rail, wall rail etc? Call with any questions other wise prescription needs to have more detail on it.    Phone: [phone]

## 2024-07-15 NOTE — TELEPHONE ENCOUNTER
Patients blood work came back normal per Zahra Hussein PA-C and to still in tomorrow 7/16/24 to see Tono Lau for her return appt POV

## 2024-07-16 ENCOUNTER — APPOINTMENT (OUTPATIENT)
Dept: ORTHOPEDIC SURGERY | Facility: CLINIC | Age: 69
End: 2024-07-16
Payer: MEDICARE

## 2024-07-16 ENCOUNTER — OFFICE VISIT (OUTPATIENT)
Dept: ORTHOPEDIC SURGERY | Facility: CLINIC | Age: 69
End: 2024-07-16
Payer: MEDICARE

## 2024-07-16 VITALS — BODY MASS INDEX: 41.7 KG/M2 | WEIGHT: 228 LBS

## 2024-07-16 DIAGNOSIS — Z96.641 STATUS POST RIGHT HIP REPLACEMENT: Primary | ICD-10-CM

## 2024-07-16 DIAGNOSIS — R26.89 UNSTABLE BALANCE: ICD-10-CM

## 2024-07-16 PROCEDURE — 1160F RVW MEDS BY RX/DR IN RCRD: CPT

## 2024-07-16 PROCEDURE — 1157F ADVNC CARE PLAN IN RCRD: CPT

## 2024-07-16 PROCEDURE — 1159F MED LIST DOCD IN RCRD: CPT

## 2024-07-16 PROCEDURE — 99214 OFFICE O/P EST MOD 30 MIN: CPT

## 2024-07-16 PROCEDURE — 1036F TOBACCO NON-USER: CPT

## 2024-07-16 RX ORDER — DOXYCYCLINE 100 MG/1
100 CAPSULE ORAL 2 TIMES DAILY
Qty: 28 CAPSULE | Refills: 0 | Status: SHIPPED | OUTPATIENT
Start: 2024-07-16 | End: 2024-07-30

## 2024-07-16 NOTE — PROGRESS NOTES
This is a consultation from Dr. Ronel Greenfield DO for   Chief Complaint   Patient presents with    Right Hip - Post-op     6/10/24 RT SNEHA-Pt previously had bloodwork done-ordered by Dr. Corona-she would like to go over that today.        This is a 68 y.o. female who presents for concerns of with her right hip and incision.  Patient states that the top of her incision from her postop right total hip replacement 5 weeks ago has not fully healed and is draining minimal blood for the past few days.  She states she has been keeping a Band-Aid on it and keeping it covered not picking at it.  Patient denies pain fever chills redness or warmth around the area.    Physical Exam    There has been no interval change in this patient's past medical, surgical, medications, allergies, family history or social history since the most recent visit to a provider within our department. 14 point review of systems was performed, reviewed, and negative except for pertinent positives documented in the history of present illness.     Constitutional: well developed, well nourished female in no acute distress  Psychiatric: normal mood, appropriate affect  Eyes: sclera anicteric  HENT: normocephalic/atraumatic  CV: regular rate and rhythm   Respiratory: non labored breathing  Integumentary: no rash  Neurological: moves all extremities    Right hip exam: Well-healing incision with a small opening on the superior aspect of the incision with a small opening and presence of white tissue on top of the hole.  No drainage at this time.  No abrasions, wounds, or lacerations. nttp over greater trochanter. negative log roll, negative landon's test. flexion to 90 degrees without pain. no pain with flexion abduction and external rotation, no pain with flexion adduction and internal rotation. neurovascularly intact distally        Procedures      Impression/Plan: This is a 68 y.o. female with concerns for her right hip incision.  I stated that the  "top of the incision is in a difficult area because of her belly that lays over top of it and does not allow for great wound healing and open to air.  I suggested that she keeps the Band-Aid over top of it and showers like normal.  I have prescribed a prophylactic antibiotic just in case.  She should follow-up in 2 weeks for reevaluation of her incision healing.  I suggested that she calls and if there is any worse worsening in the look of her incision with redness drainage of pus or fevers chills with severe increase in pain.  We also discussed her normal lab results that she received recently.  that show    BMI Readings from Last 1 Encounters:   07/16/24 41.70 kg/m²      Lab Results   Component Value Date    CREATININE 0.99 06/12/2024     Tobacco Use: Low Risk  (7/16/2024)    Patient History     Smoking Tobacco Use: Never     Smokeless Tobacco Use: Never     Passive Exposure: Not on file      Computed MELD 3.0 unavailable. One or more values for this score either were not found within the given timeframe or did not fit some other criterion.  Computed MELD-Na unavailable. One or more values for this score either were not found within the given timeframe or did not fit some other criterion.       No results found for: \"HGBA1C\"  Lab Results   Component Value Date    STAPHMRSASCR (A) 05/23/2024     Isolated: Methicillin Susceptible Staphylococcus aureus (MSSA)     "

## 2024-07-17 ENCOUNTER — HOME CARE VISIT (OUTPATIENT)
Dept: HOME HEALTH SERVICES | Facility: HOME HEALTH | Age: 69
End: 2024-07-17
Payer: MEDICARE

## 2024-07-17 DIAGNOSIS — Z96.641 STATUS POST RIGHT HIP REPLACEMENT: Primary | ICD-10-CM

## 2024-07-17 PROCEDURE — G0157 HHC PT ASSISTANT EA 15: HCPCS | Mod: CQ,HHH

## 2024-07-17 ASSESSMENT — PAIN SCALES - PAIN ASSESSMENT IN ADVANCED DEMENTIA (PAINAD)
BODYLANGUAGE: 0 - RELAXED.
NEGVOCALIZATION: 0
CONSOLABILITY: 0 - NO NEED TO CONSOLE.
TOTALSCORE: 0
BREATHING: 0
NEGVOCALIZATION: 0 - NONE.
BODYLANGUAGE: 0
CONSOLABILITY: 0
FACIALEXPRESSION: 0
FACIALEXPRESSION: 0 - SMILING OR INEXPRESSIVE.

## 2024-07-17 ASSESSMENT — ENCOUNTER SYMPTOMS
HIGHEST PAIN SEVERITY IN PAST 24 HOURS: 7/10
PERSON REPORTING PAIN: PATIENT
SUBJECTIVE PAIN PROGRESSION: GRADUALLY IMPROVING
LOWEST PAIN SEVERITY IN PAST 24 HOURS: 0/10
PAIN SEVERITY GOAL: 0/10
PAIN: 1
PAIN LOCATION: RIGHT HIP

## 2024-07-17 NOTE — TELEPHONE ENCOUNTER
6/10/24 rt heavenly  Patient wants to know if we can put in a prescription for a 4 prong cane? She is no longer needing the walker.    [facility] [place]

## 2024-07-22 ENCOUNTER — HOME CARE VISIT (OUTPATIENT)
Dept: HOME HEALTH SERVICES | Facility: HOME HEALTH | Age: 69
End: 2024-07-22
Payer: MEDICARE

## 2024-07-22 PROCEDURE — G0157 HHC PT ASSISTANT EA 15: HCPCS | Mod: CQ,HHH

## 2024-07-22 ASSESSMENT — ENCOUNTER SYMPTOMS
PERSON REPORTING PAIN: PATIENT
HIGHEST PAIN SEVERITY IN PAST 24 HOURS: 0/10
LOWEST PAIN SEVERITY IN PAST 24 HOURS: 0/10
DENIES PAIN: 1
PAIN SEVERITY GOAL: 0/10
SUBJECTIVE PAIN PROGRESSION: GRADUALLY IMPROVING

## 2024-07-23 ENCOUNTER — TELEPHONE (OUTPATIENT)
Dept: ORTHOPEDIC SURGERY | Facility: CLINIC | Age: 69
End: 2024-07-23
Payer: MEDICARE

## 2024-07-23 NOTE — TELEPHONE ENCOUNTER
7/16/24 rt heavenly 6/10/24  Patient had antibiotic once and then came back and was seen and infection hadn't cleared up so they put her on the Doxycycline 100mg capsules again. Patient began the 2nd round and she began to have hives and itchiness on Saturday 7/20/24. She has since stopped the medication on 07/21/24 because she feels she had a reaction to it. Has an appointment next week with Dr. Corona but wants to make sure this is ok? She has been taking Benedryl. I told patient to also notify her PCP.

## 2024-07-24 RX ORDER — SULFAMETHOXAZOLE AND TRIMETHOPRIM 800; 160 MG/1; MG/1
1 TABLET ORAL 2 TIMES DAILY
Qty: 28 TABLET | Refills: 0 | Status: SHIPPED | OUTPATIENT
Start: 2024-07-24

## 2024-07-25 ENCOUNTER — HOME CARE VISIT (OUTPATIENT)
Dept: HOME HEALTH SERVICES | Facility: HOME HEALTH | Age: 69
End: 2024-07-25
Payer: MEDICARE

## 2024-07-25 SDOH — HEALTH STABILITY: PHYSICAL HEALTH: EXERCISE TYPE: HEP

## 2024-07-25 ASSESSMENT — PAIN SCALES - PAIN ASSESSMENT IN ADVANCED DEMENTIA (PAINAD)
BREATHING: 0
CONSOLABILITY: 0 - NO NEED TO CONSOLE.
TOTALSCORE: 0
BODYLANGUAGE: 0 - RELAXED.
NEGVOCALIZATION: 0
FACIALEXPRESSION: 0
NEGVOCALIZATION: 0 - NONE.
FACIALEXPRESSION: 0 - SMILING OR INEXPRESSIVE.
CONSOLABILITY: 0
BODYLANGUAGE: 0

## 2024-07-25 ASSESSMENT — ENCOUNTER SYMPTOMS
HIGHEST PAIN SEVERITY IN PAST 24 HOURS: 4/10
SUBJECTIVE PAIN PROGRESSION: UNCHANGED
PAIN SEVERITY GOAL: 0/10
PERSON REPORTING PAIN: PATIENT
LOWEST PAIN SEVERITY IN PAST 24 HOURS: 0/10
PAIN LOCATION: INCISION
PAIN: 1

## 2024-07-25 ASSESSMENT — ACTIVITIES OF DAILY LIVING (ADL)
AMBULATION ASSISTANCE ON FLAT SURFACES: 1
HOME_HEALTH_OASIS: 00
OASIS_M1830: 01

## 2024-07-25 NOTE — CASE COMMUNICATION
Patient discharged from PT services and home health care with pt expected to transition to op services following dr parisi on tuesday with Dr Corona. Pt refused in person discharge and requested a phome dc today secondary to conflicting appts this AM. Pt discharged with pt in agreement. pt denies any falls and reported antibiotic change to bactrim as she had a reaction to doxycycline

## 2024-07-26 ENCOUNTER — APPOINTMENT (OUTPATIENT)
Dept: ORTHOPEDIC SURGERY | Facility: CLINIC | Age: 69
End: 2024-07-26
Payer: MEDICARE

## 2024-07-30 ENCOUNTER — APPOINTMENT (OUTPATIENT)
Dept: ORTHOPEDIC SURGERY | Facility: CLINIC | Age: 69
End: 2024-07-30
Payer: MEDICARE

## 2024-07-30 VITALS — BODY MASS INDEX: 41.34 KG/M2 | WEIGHT: 226 LBS

## 2024-07-30 DIAGNOSIS — Z96.641 STATUS POST RIGHT HIP REPLACEMENT: Primary | ICD-10-CM

## 2024-07-30 PROCEDURE — 1036F TOBACCO NON-USER: CPT

## 2024-07-30 PROCEDURE — 1157F ADVNC CARE PLAN IN RCRD: CPT

## 2024-07-30 PROCEDURE — 1160F RVW MEDS BY RX/DR IN RCRD: CPT

## 2024-07-30 PROCEDURE — 99024 POSTOP FOLLOW-UP VISIT: CPT

## 2024-07-30 PROCEDURE — 1159F MED LIST DOCD IN RCRD: CPT

## 2024-07-30 ASSESSMENT — PAIN - FUNCTIONAL ASSESSMENT: PAIN_FUNCTIONAL_ASSESSMENT: NO/DENIES PAIN

## 2024-07-30 NOTE — PROGRESS NOTES
Chief Complaint   Patient presents with    Right Hip - Post-op     6/10/24 RT SNEHA         This is a 68 y.o. female who is 6 weeks out from right total hip.  Pain is under control with current medications.  No drainage from her incision no fevers or chills.  She recently finished a round of doxycycline which she states originally gave her hives the first.  She was switched to Bactrim which worked better for her.  The superior part of her incision looks much better and is healing well.  Progressing well with physical therapy and appropriately improving in function.  No other new issues or symptoms.    Right hip examination: Surgical incision well healed no erythema no drainage.  no pain with range of motion neurovascular tact distally    X-rays of the hip were reviewed independently interpreted by me today, the show stable total hip arthroplasty no fracture dislocation or loosening    Impression plan: 68 y.o. female 6 weeks out from right total hip.  We discussed continuing physical therapy and home exercise program. Pain medications to be used as needed. Assistive devices as needed per PT. We discussed DVT prophylaxis until 6 weeks. No dentist until 3 months and thereafter dental prophylaxis for life. Activity as tolerated weightbearing as tolerated, hip precautions until 3 months. I have personally reviewed the OARRS report for the patient. This report is scanned into the electronic medical record. I have considered the risks of abuse, dependence, addiction and diversion. Follow up 3 months with xrays.

## 2024-08-06 ENCOUNTER — APPOINTMENT (OUTPATIENT)
Dept: PRIMARY CARE | Facility: CLINIC | Age: 69
End: 2024-08-06
Payer: MEDICARE

## 2024-08-16 ENCOUNTER — APPOINTMENT (OUTPATIENT)
Dept: PRIMARY CARE | Facility: CLINIC | Age: 69
End: 2024-08-16
Payer: MEDICARE

## 2024-09-03 ENCOUNTER — APPOINTMENT (OUTPATIENT)
Dept: PRIMARY CARE | Facility: CLINIC | Age: 69
End: 2024-09-03
Payer: MEDICARE

## 2024-09-03 VITALS
OXYGEN SATURATION: 98 % | WEIGHT: 226 LBS | HEIGHT: 62 IN | HEART RATE: 108 BPM | BODY MASS INDEX: 41.59 KG/M2 | DIASTOLIC BLOOD PRESSURE: 82 MMHG | TEMPERATURE: 97.8 F | SYSTOLIC BLOOD PRESSURE: 130 MMHG

## 2024-09-03 DIAGNOSIS — Z12.31 ENCOUNTER FOR SCREENING MAMMOGRAM FOR MALIGNANT NEOPLASM OF BREAST: ICD-10-CM

## 2024-09-03 DIAGNOSIS — Z00.00 ROUTINE GENERAL MEDICAL EXAMINATION AT HEALTH CARE FACILITY: Primary | ICD-10-CM

## 2024-09-03 DIAGNOSIS — D12.6 ADENOMATOUS POLYP OF COLON, UNSPECIFIED PART OF COLON: ICD-10-CM

## 2024-09-03 DIAGNOSIS — Z78.0 POST-MENOPAUSAL: ICD-10-CM

## 2024-09-03 PROCEDURE — G0439 PPPS, SUBSEQ VISIT: HCPCS | Performed by: FAMILY MEDICINE

## 2024-09-03 PROCEDURE — 90662 IIV NO PRSV INCREASED AG IM: CPT | Performed by: FAMILY MEDICINE

## 2024-09-03 PROCEDURE — 1124F ACP DISCUSS-NO DSCNMKR DOCD: CPT | Performed by: FAMILY MEDICINE

## 2024-09-03 PROCEDURE — 1170F FXNL STATUS ASSESSED: CPT | Performed by: FAMILY MEDICINE

## 2024-09-03 PROCEDURE — G0008 ADMIN INFLUENZA VIRUS VAC: HCPCS | Performed by: FAMILY MEDICINE

## 2024-09-03 PROCEDURE — 3075F SYST BP GE 130 - 139MM HG: CPT | Performed by: FAMILY MEDICINE

## 2024-09-03 PROCEDURE — 1159F MED LIST DOCD IN RCRD: CPT | Performed by: FAMILY MEDICINE

## 2024-09-03 PROCEDURE — 3008F BODY MASS INDEX DOCD: CPT | Performed by: FAMILY MEDICINE

## 2024-09-03 PROCEDURE — 1157F ADVNC CARE PLAN IN RCRD: CPT | Performed by: FAMILY MEDICINE

## 2024-09-03 PROCEDURE — 3079F DIAST BP 80-89 MM HG: CPT | Performed by: FAMILY MEDICINE

## 2024-09-03 RX ORDER — METHOCARBAMOL 500 MG/1
500 TABLET, FILM COATED ORAL 3 TIMES DAILY PRN
COMMUNITY

## 2024-09-03 ASSESSMENT — ACTIVITIES OF DAILY LIVING (ADL)
DRESSING: INDEPENDENT
TAKING_MEDICATION: INDEPENDENT
MANAGING_FINANCES: INDEPENDENT
GROCERY_SHOPPING: INDEPENDENT
DOING_HOUSEWORK: INDEPENDENT
BATHING: INDEPENDENT

## 2024-09-03 ASSESSMENT — ENCOUNTER SYMPTOMS
DEPRESSION: 0
OCCASIONAL FEELINGS OF UNSTEADINESS: 1
LOSS OF SENSATION IN FEET: 0

## 2024-09-03 ASSESSMENT — PATIENT HEALTH QUESTIONNAIRE - PHQ9
2. FEELING DOWN, DEPRESSED OR HOPELESS: NOT AT ALL
SUM OF ALL RESPONSES TO PHQ9 QUESTIONS 1 AND 2: 0
1. LITTLE INTEREST OR PLEASURE IN DOING THINGS: NOT AT ALL

## 2024-09-03 NOTE — PROGRESS NOTES
"Subjective   Reason for Visit: Jojo Martin is an 68 y.o. female here for a Medicare Wellness visit.     Past Medical, Surgical, and Family History reviewed and updated in chart.    Reviewed all medications by prescribing practitioner or clinical pharmacist (such as prescriptions, OTCs, herbal therapies and supplements) and documented in the medical record.    HPI FEELS WELL MOST DAYS     Patient Care Team:  Ronel Greenfield DO as PCP - General  Ronel Greenfield DO as PCP - [employer] PCP     Review of Systems   Constitutional:  Negative for activity change, appetite change, fever and unexpected weight change.   HENT:  Negative for congestion, ear pain, postnasal drip, rhinorrhea, sinus pressure and sore throat.    Eyes:  Negative for discharge, itching and visual disturbance.   Respiratory:  Negative for cough, shortness of breath and wheezing.    Cardiovascular:  Negative for chest pain, palpitations and leg swelling.   Gastrointestinal:  Negative for abdominal pain, blood in stool, constipation, diarrhea, nausea and vomiting.   Endocrine: Negative for cold intolerance, heat intolerance and polyuria.   Genitourinary:  Negative for dysuria, flank pain and hematuria.   Musculoskeletal:  Negative for arthralgias, gait problem, joint swelling and myalgias.   Skin:  Negative for rash.   Allergic/Immunologic: Negative for environmental allergies and food allergies.   Neurological:  Negative for dizziness, syncope, weakness, light-headedness, numbness and headaches.   Psychiatric/Behavioral:  Negative for dysphoric mood and sleep disturbance. The patient is not nervous/anxious.        Objective   Vitals:  /82   Pulse 108   Temp 36.6 °C (97.8 °F)   Ht 1.568 m (5' 1.75\")   Wt 103 kg (226 lb)   SpO2 98%   BMI 41.67 kg/m²       Physical Exam  Vitals and nursing note reviewed.   Constitutional:       Appearance: Normal appearance.   HENT:      Head: Normocephalic.   Cardiovascular:      " Rate and Rhythm: Normal rate and regular rhythm.      Pulses: Normal pulses.      Heart sounds: Normal heart sounds. No murmur heard.     No friction rub. No gallop.   Pulmonary:      Effort: Pulmonary effort is normal. No respiratory distress.      Breath sounds: Normal breath sounds. No wheezing.   Abdominal:      General: There is no distension.      Palpations: Abdomen is soft.      Tenderness: There is no abdominal tenderness.   Musculoskeletal:         General: No deformity. Normal range of motion.   Skin:     General: Skin is warm and dry.      Capillary Refill: Capillary refill takes less than 2 seconds.   Neurological:      General: No focal deficit present.      Mental Status: She is alert and oriented to person, place, and time.   Psychiatric:         Mood and Affect: Mood normal.         Assessment/Plan   Problem List Items Addressed This Visit    None  Visit Diagnoses         Codes    Routine general medical examination at health care facility    -  Primary Z00.00    Encounter for screening mammogram for malignant neoplasm of breast     Z12.31    Relevant Orders    BI mammo bilateral screening tomosynthesis    Post-menopausal     Z78.0    Relevant Orders    XR DEXA bone density

## 2024-09-03 NOTE — PATIENT INSTRUCTIONS
mammogram and bone density ordered these will be at a [facility] AND REFERRAL TO [facility]   Return here for lipid cmp and A1c due October   Micro albumin of urine

## 2024-09-09 ENCOUNTER — APPOINTMENT (OUTPATIENT)
Dept: GASTROENTEROLOGY | Facility: CLINIC | Age: 69
End: 2024-09-09
Payer: MEDICARE

## 2024-09-10 ASSESSMENT — ENCOUNTER SYMPTOMS
FEVER: 0
FLANK PAIN: 0
WEAKNESS: 0
DYSPHORIC MOOD: 0
JOINT SWELLING: 0
RHINORRHEA: 0
APPETITE CHANGE: 0
CONSTIPATION: 0
DIARRHEA: 0
NAUSEA: 0
LIGHT-HEADEDNESS: 0
NUMBNESS: 0
SHORTNESS OF BREATH: 0
NERVOUS/ANXIOUS: 0
UNEXPECTED WEIGHT CHANGE: 0
HEMATURIA: 0
BLOOD IN STOOL: 0
EYE ITCHING: 0
PALPITATIONS: 0
EYE DISCHARGE: 0
ACTIVITY CHANGE: 0
SORE THROAT: 0
WHEEZING: 0
MYALGIAS: 0
SINUS PRESSURE: 0
HEADACHES: 0
ABDOMINAL PAIN: 0
DIZZINESS: 0
DYSURIA: 0
COUGH: 0
SLEEP DISTURBANCE: 0
ARTHRALGIAS: 0
VOMITING: 0

## 2024-09-13 ENCOUNTER — APPOINTMENT (OUTPATIENT)
Dept: ORTHOPEDIC SURGERY | Facility: CLINIC | Age: 69
End: 2024-09-13
Payer: MEDICARE

## 2024-09-13 ENCOUNTER — HOSPITAL ENCOUNTER (OUTPATIENT)
Dept: RADIOLOGY | Facility: HOSPITAL | Age: 69
Discharge: HOME | End: 2024-09-13
Payer: MEDICARE

## 2024-09-13 DIAGNOSIS — Z96.641 HISTORY OF TOTAL RIGHT HIP ARTHROPLASTY: Primary | ICD-10-CM

## 2024-09-13 DIAGNOSIS — M25.552 LEFT HIP PAIN: ICD-10-CM

## 2024-09-13 DIAGNOSIS — Z96.641 HISTORY OF TOTAL RIGHT HIP ARTHROPLASTY: ICD-10-CM

## 2024-09-13 PROCEDURE — 1159F MED LIST DOCD IN RCRD: CPT

## 2024-09-13 PROCEDURE — 1036F TOBACCO NON-USER: CPT

## 2024-09-13 PROCEDURE — 1157F ADVNC CARE PLAN IN RCRD: CPT

## 2024-09-13 PROCEDURE — 99213 OFFICE O/P EST LOW 20 MIN: CPT

## 2024-09-13 PROCEDURE — 73502 X-RAY EXAM HIP UNI 2-3 VIEWS: CPT | Mod: RT

## 2024-09-13 PROCEDURE — 1160F RVW MEDS BY RX/DR IN RCRD: CPT

## 2024-09-13 NOTE — PROGRESS NOTES
This is a consultation from Dr. Ronel Greenfield DO for   Chief Complaint   Patient presents with    Right Hip - Follow-up       This is a 68 y.o. female who presents for postop evaluation of her right total hip.  She states that she has been doing well but her only complaint is that she cannot get socks to stay on her right foot.  She does not complain of pain when she is walking but states that she has noticed a leg length discrepancy.  She believes that this is caused her left hip to become more painful and experiences pain almost every day.  She states that the pain occurs when she walks and rises from a seated position.  Patient denies any numbness or tingling or distal extremities.    Physical Exam    There has been no interval change in this patient's past medical, surgical, medications, allergies, family history or social history since the most recent visit to a provider within our department. 14 point review of systems was performed, reviewed, and negative except for pertinent positives documented in the history of present illness.     Constitutional: well developed, well nourished female in no acute distress  Psychiatric: normal mood, appropriate affect  Eyes: sclera anicteric  HENT: normocephalic/atraumatic  CV: regular rate and rhythm   Respiratory: non labored breathing  Integumentary: no rash  Neurological: moves all extremities    Right hip exam: skin normal, no abrasions, wounds, or lacerations.  Incision is well-healed, dry, not erythematous.  nttp over greater trochanter. negative log roll, negative landon's test. flexion to 90 degrees without pain. no pain with flexion abduction and external rotation, no pain with flexion adduction and internal rotation. neurovascularly intact distally    Left hip exam: skin normal, no abrasions, wounds, or lacerations. nttp over greater trochanter. negative log roll, negative landon's test. flexion to 90 degrees with pain.  pain with flexion abduction and  "external rotation, no pain with flexion adduction and internal rotation. neurovascularly intact distally        Xrays were ordered by me, they were reviewed and independently interpreted by me today, they show mild joint space degeneration on her left hip.  No presence of acute fracture or dislocation.  Also shows a stable right hip total arthroplasty with no presence of loosening or acute fracture.    Procedures      Impression/Plan: This is a 68 y.o. female is doing well following a right total hip arthroplasty.  She is worried about her left hip now and is wondering if she has to get it replaced as well.  I explained to the patient that since her pain is almost constant and bothers her daily activities I would recommend a steroid shot in the hip to see if that can help alleviate pain in the time being.  She agreed and would like to proceed with this treatment option.  She is welcome to follow-up in the future and I recommended that she sees Dr. Corona to discuss surgical options whenever she is ready.    BMI Readings from Last 1 Encounters:   09/03/24 41.67 kg/m²      Lab Results   Component Value Date    CREATININE 0.99 06/12/2024     Tobacco Use: Low Risk  (9/11/2024)    Received from [facility]    Patient History     Smoking Tobacco Use: Never     Smokeless Tobacco Use: Never     Passive Exposure: Not on file      Computed MELD 3.0 unavailable. One or more values for this score either were not found within the given timeframe or did not fit some other criterion.  Computed MELD-Na unavailable. One or more values for this score either were not found within the given timeframe or did not fit some other criterion.       No results found for: \"HGBA1C\"  Lab Results   Component Value Date    STAPHMRSASCR (A) 05/23/2024     Isolated: Methicillin Susceptible Staphylococcus aureus (MSSA)     "

## 2024-09-19 ENCOUNTER — APPOINTMENT (OUTPATIENT)
Dept: RADIOLOGY | Facility: HOSPITAL | Age: 69
End: 2024-09-19
Payer: MEDICARE

## 2024-09-23 LAB
ATRIAL RATE: 98 BPM
P AXIS: 46 DEGREES
P OFFSET: 181 MS
P ONSET: 147 MS
PR INTERVAL: 130 MS
Q ONSET: 212 MS
QRS COUNT: 16 BEATS
QRS DURATION: 78 MS
QT INTERVAL: 360 MS
QTC CALCULATION(BAZETT): 459 MS
QTC FREDERICIA: 424 MS
R AXIS: -24 DEGREES
T AXIS: 9 DEGREES
T OFFSET: 392 MS
VENTRICULAR RATE: 98 BPM

## 2024-09-24 ENCOUNTER — HOSPITAL ENCOUNTER (OUTPATIENT)
Dept: RADIOLOGY | Facility: HOSPITAL | Age: 69
Discharge: HOME | End: 2024-09-24
Payer: MEDICARE

## 2024-09-24 DIAGNOSIS — D12.6 ADENOMATOUS POLYP OF COLON, UNSPECIFIED PART OF COLON: ICD-10-CM

## 2024-09-24 DIAGNOSIS — Z78.0 POST-MENOPAUSAL: ICD-10-CM

## 2024-09-24 PROCEDURE — 77080 DXA BONE DENSITY AXIAL: CPT

## 2024-09-24 PROCEDURE — 77080 DXA BONE DENSITY AXIAL: CPT | Performed by: RADIOLOGY

## 2024-09-27 ENCOUNTER — APPOINTMENT (OUTPATIENT)
Dept: ORTHOPEDIC SURGERY | Facility: CLINIC | Age: 69
End: 2024-09-27
Payer: MEDICARE

## 2024-09-27 DIAGNOSIS — M16.12 ARTHRITIS OF LEFT HIP: Primary | ICD-10-CM

## 2024-09-27 PROCEDURE — 1160F RVW MEDS BY RX/DR IN RCRD: CPT

## 2024-09-27 PROCEDURE — 1159F MED LIST DOCD IN RCRD: CPT

## 2024-09-27 PROCEDURE — 99024 POSTOP FOLLOW-UP VISIT: CPT

## 2024-09-27 PROCEDURE — 1125F AMNT PAIN NOTED PAIN PRSNT: CPT

## 2024-09-27 PROCEDURE — 1036F TOBACCO NON-USER: CPT

## 2024-09-27 PROCEDURE — 1157F ADVNC CARE PLAN IN RCRD: CPT

## 2024-09-27 ASSESSMENT — PAIN SCALES - GENERAL: PAINLEVEL_OUTOF10: 3

## 2024-09-27 ASSESSMENT — PAIN - FUNCTIONAL ASSESSMENT: PAIN_FUNCTIONAL_ASSESSMENT: 0-10

## 2024-09-27 NOTE — PROGRESS NOTES
This visit was a mistake because she was supposed to be set up with an appointment with radiology for a left hip joint injection.  I walked the patient to radiology where she set up a future appointment for this injection.  No charge for the visit.

## 2024-10-01 ENCOUNTER — HOSPITAL ENCOUNTER (OUTPATIENT)
Dept: RADIOLOGY | Facility: HOSPITAL | Age: 69
Discharge: HOME | End: 2024-10-01
Payer: MEDICARE

## 2024-10-01 DIAGNOSIS — M25.552 LEFT HIP PAIN: ICD-10-CM

## 2024-10-01 PROCEDURE — 20610 DRAIN/INJ JOINT/BURSA W/O US: CPT | Mod: LEFT SIDE | Performed by: RADIOLOGY

## 2024-10-01 PROCEDURE — 77002 NEEDLE LOCALIZATION BY XRAY: CPT | Mod: LT

## 2024-10-01 PROCEDURE — 77002 NEEDLE LOCALIZATION BY XRAY: CPT | Mod: LEFT SIDE | Performed by: RADIOLOGY

## 2024-10-01 PROCEDURE — 2550000001 HC RX 255 CONTRASTS

## 2024-10-01 RX ORDER — LIDOCAINE HYDROCHLORIDE 10 MG/ML
13 INJECTION, SOLUTION EPIDURAL; INFILTRATION; INTRACAUDAL; PERINEURAL ONCE
Status: DISCONTINUED | OUTPATIENT
Start: 2024-10-01 | End: 2024-10-02 | Stop reason: HOSPADM

## 2024-10-01 RX ORDER — BUPIVACAINE HYDROCHLORIDE 2.5 MG/ML
INJECTION, SOLUTION EPIDURAL; INFILTRATION; INTRACAUDAL
Status: DISPENSED
Start: 2024-10-01 | End: 2024-10-01

## 2024-10-01 RX ORDER — TRIAMCINOLONE ACETONIDE 40 MG/ML
INJECTION, SUSPENSION INTRA-ARTICULAR; INTRAMUSCULAR
Status: DISPENSED
Start: 2024-10-01 | End: 2024-10-01

## 2024-10-01 NOTE — PRE-PROCEDURE NOTE
Interventional Radiology Preprocedure Note    Indication for procedure: The encounter diagnosis was Left hip pain.    Relevant review of systems: NA    Relevant Labs:   Lab Results   Component Value Date    CREATININE 0.99 06/12/2024    EGFR 63 06/12/2024    INR 1.1 07/16/2018    PROTIME 11.3 07/16/2018       Planned Sedation/Anesthesia: Minimal    Airway assessment:  NA    Directed physical examination:    NA    Mallampati:  NA    ASA Score: ASA 1 - Normal health patient    Benefits, risks and alternatives of procedure and planned sedation have been discussed with the patient and/or their representative. All questions answered and they agree to proceed.

## 2024-10-01 NOTE — POST-PROCEDURE NOTE
Interventional Radiology Brief Postprocedure Note    Attending: Herber Santiago MD    Assistant: NOne    Diagnosis: Left hip pain    Description of procedure: Fluoroscopically guided left  hip injection     Anesthesia:  Local    Complications: None    Estimated Blood Loss: none    Medications (Filter: Administrations occurring from 1310 to 1415 on 10/01/24) As of 10/01/24 1415      None          No specimens collected      See detailed result report with images in PACS.    The patient tolerated the procedure well without incident or complication and is in stable condition.

## 2024-10-25 ENCOUNTER — LAB (OUTPATIENT)
Dept: LAB | Facility: LAB | Age: 69
End: 2024-10-25
Payer: MEDICARE

## 2024-10-25 ENCOUNTER — APPOINTMENT (OUTPATIENT)
Dept: PRIMARY CARE | Facility: CLINIC | Age: 69
End: 2024-10-25
Payer: MEDICARE

## 2024-10-25 DIAGNOSIS — Z00.00 ROUTINE GENERAL MEDICAL EXAMINATION AT HEALTH CARE FACILITY: ICD-10-CM

## 2024-10-25 DIAGNOSIS — I10 ESSENTIAL (PRIMARY) HYPERTENSION: ICD-10-CM

## 2024-10-25 LAB
ALBUMIN SERPL BCP-MCNC: 4.4 G/DL (ref 3.4–5)
ALP SERPL-CCNC: 109 U/L (ref 33–136)
ALT SERPL W P-5'-P-CCNC: 34 U/L (ref 7–45)
ANION GAP SERPL CALC-SCNC: 12 MMOL/L (ref 10–20)
AST SERPL W P-5'-P-CCNC: 41 U/L (ref 9–39)
BILIRUB SERPL-MCNC: 1 MG/DL (ref 0–1.2)
BUN SERPL-MCNC: 27 MG/DL (ref 6–23)
CALCIUM SERPL-MCNC: 9.1 MG/DL (ref 8.6–10.3)
CHLORIDE SERPL-SCNC: 103 MMOL/L (ref 98–107)
CHOLEST SERPL-MCNC: 157 MG/DL (ref 0–199)
CHOLESTEROL/HDL RATIO: 3.4
CO2 SERPL-SCNC: 27 MMOL/L (ref 21–32)
CREAT SERPL-MCNC: 1.07 MG/DL (ref 0.5–1.05)
EGFRCR SERPLBLD CKD-EPI 2021: 57 ML/MIN/1.73M*2
GLUCOSE SERPL-MCNC: 107 MG/DL (ref 74–99)
HDLC SERPL-MCNC: 45.7 MG/DL
LDLC SERPL CALC-MCNC: 90 MG/DL
NON HDL CHOLESTEROL: 111 MG/DL (ref 0–149)
POTASSIUM SERPL-SCNC: 4.9 MMOL/L (ref 3.5–5.3)
PROT SERPL-MCNC: 7 G/DL (ref 6.4–8.2)
SODIUM SERPL-SCNC: 137 MMOL/L (ref 136–145)
TRIGL SERPL-MCNC: 109 MG/DL (ref 0–149)
VLDL: 22 MG/DL (ref 0–40)

## 2024-10-25 PROCEDURE — 83036 HEMOGLOBIN GLYCOSYLATED A1C: CPT

## 2024-10-25 PROCEDURE — 80061 LIPID PANEL: CPT

## 2024-10-25 PROCEDURE — 80053 COMPREHEN METABOLIC PANEL: CPT

## 2024-10-25 PROCEDURE — 36415 COLL VENOUS BLD VENIPUNCTURE: CPT

## 2024-10-26 LAB
EST. AVERAGE GLUCOSE BLD GHB EST-MCNC: 131 MG/DL
HBA1C MFR BLD: 6.2 %

## 2024-11-01 ENCOUNTER — APPOINTMENT (OUTPATIENT)
Dept: PRIMARY CARE | Facility: CLINIC | Age: 69
End: 2024-11-01
Payer: MEDICARE

## 2024-11-04 PROBLEM — K59.00 CONSTIPATION, UNSPECIFIED: Status: ACTIVE | Noted: 2024-06-12

## 2024-11-04 PROBLEM — K57.92 DIVERTICULITIS OF INTESTINE: Status: ACTIVE | Noted: 2024-11-04

## 2024-11-04 PROBLEM — G89.4 CHRONIC PAIN SYNDROME: Status: ACTIVE | Noted: 2024-04-24

## 2024-11-04 NOTE — PROGRESS NOTES
History Of Present Illness  Jojo Martin is a 69 y.o. female presenting to GI clinic with a chief complaint of      Pt has history of colon polyps and Dr. Greenfield said she is due to have another. Last colonoscopy was in 2022. No diarrhea, has problems with constipation. Takes Metamucil and Colace daily.           Social History  She reports that she has never smoked. She has never used smokeless tobacco. She reports that she does not drink alcohol and does not use drugs.  She does not take NSAIDs on a regular basis    Family History  Family History   Problem Relation Name Age of Onset    Cancer Mother      Stomach cancer Mother      Other (Urinary bladder cancer) Father      Throat cancer Father      Cancer Sister      Brain cancer Sister      Breast cancer Sister      Sarcoidosis Sister HARIKA     Brain cancer Sibling       The patient does not have a FH of CRC. she does not have a FH of IBD    Review of Systems   Gastrointestinal:         See HPI   All other systems reviewed and are negative.        Physical Exam  Constitutional:       General: She is not in acute distress.     Appearance: Normal appearance. She is obese. She is not ill-appearing.   HENT:      Head: Normocephalic and atraumatic.      Mouth/Throat:      Mouth: Mucous membranes are moist.   Eyes:      General: No scleral icterus.     Conjunctiva/sclera: Conjunctivae normal.      Pupils: Pupils are equal, round, and reactive to light.   Pulmonary:      Effort: Pulmonary effort is normal.   Abdominal:      General: Bowel sounds are normal. There is no distension.      Palpations: Abdomen is soft. There is no mass.      Tenderness: There is no abdominal tenderness.      Hernia: No hernia is present.   Musculoskeletal:         General: Normal range of motion.      Cervical back: Normal range of motion.   Skin:     General: Skin is warm and dry.      Coloration: Skin is not jaundiced or pale.   Neurological:      Mental Status: She is alert. Mental  "status is at baseline.   Psychiatric:         Mood and Affect: Mood normal.         Behavior: Behavior normal.          Last Vital Signs  BP (!) 150/93   Pulse 85   Ht 1.568 m (5' 1.75\")   Wt 107 kg (235 lb)   SpO2 97%   BMI 43.33 kg/m²      Relevant Results  Lab Results   Component Value Date    WBC 5.7 07/12/2024    HGB 11.0 (L) 07/12/2024    HCT 35.1 (L) 07/12/2024    MCV 93 07/12/2024     07/12/2024      Lab Results   Component Value Date    GLUCOSE 107 (H) 10/25/2024    CALCIUM 9.1 10/25/2024     10/25/2024    K 4.9 10/25/2024    CO2 27 10/25/2024     10/25/2024    BUN 27 (H) 10/25/2024    CREATININE 1.07 (H) 10/25/2024      Lab Results   Component Value Date    ALT 34 10/25/2024    AST 41 (H) 10/25/2024    ALKPHOS 109 10/25/2024    BILITOT 1.0 10/25/2024    INR 1.1 07/16/2018    No results found for: \"IRON\", \"TIBC\", \"IRONSAT\", \"FERRITIN\", \"[id number]\", \"FOLATE\"    Lab Results   Component Value Date    CRP 0.53 07/12/2024    No results found for: \"LIPASE\"   Lab Results   Component Value Date    HGBA1C 6.2 (H) 10/25/2024        EGD/COLONOSCOPY/COLOGUARD  EGD- never       Colonoscopy 3/8/2022 with Dr. Mesa- Findings:  RECTAL EXAM: Normal sphincter tone, no palpable masses.  ILEUM: The terminal ileum appeared normal.  COLON: There were 2 sessile 2 mm diameter polyps in the cecum and 2 in the ascending colon removed with cold biopsy forceps. There was a 4 mm diameter sessile polyp at the hepatic flexure and another at the mid transverse colon both removed with cold snare polypectomy and retrieved. There were multiple small to medium-sized diverticuli in the descending and sigmoid colon some impacted with solid stool. Otherwise the colon appeared normal. Retroflexed views of the rectum revealed small internal hemorrhoids.   FINAL DIAGNOSIS  A.  Cecal polyps, biopsy: Tubular adenoma and hyperplastic polyp.    B.  Ascending colon polyps, biopsy: Tubular adenomas.    C.  Colon, hepatic " flexure polyp, biopsy: Tubular adenoma.    D.  Transverse colon polyp, biopsy: Tubular adenoma.      Colonoscopy 11/3/2016 with Dr. Figueroa- Findings:  RECTAL EXAM: Normal sphincter tone, no palpable masses.   COLON: The mucosa throughout the colon appeared to be normal. There is one sessile polyp noted within the ascending colon measuring about 0.3 cm in diameter. This was removed using a cold biopsy forceps. There were no mass lesions, vascular abnormalities, strictures or any inflammatory changes seen. The scope was retroflexed in the distal rectum, that revealed grade 1 internal hemorrhoids.   FINAL DIAGNOSIS  Ascending Colon, Biopsy - SMALL FRAGMENTS OF TUBULAR ADENOMA.      Colonoscopy 10/17/2012 with Dr. Figueroa- FINDINGS:   The preparation appeared to be adequate. There was 1 sessile polyp noted within the ascending colon measuring about 0.3 to 0.4 cm in diameter and this was removed using a cold biopsy forceps. There were 3 sessile polyps noted within the ascending colon. One appeared to be measure about 0.8 to 1 cm in diameter and this was removed using a snare and electrocautery. The other two appeared to be smaller measuring about 0.3 cm in diameter and these were removed using a cold biopsy forceps. There was 1 sessile polyp noted within the transverse colon measuring about 0.3 cm in diameter and this was removed using a cold biopsy forceps.   Apart from the above, the cecum, ascending colon, hepatic flexure, transverse colon, splenic flexure, descending colon, sigmoid colon and the rectum  appeared to be free of any other polyps, mass lesions or any inflammatory  changes. The scope was then retroflexed in the distal rectum that revealed  internal hemorrhoids. Digital rectal examination did not reveal any lesions.   FINAL DIAGNOSIS  A.  Colon, Random Biopsy:  BENIGN COLONIC MUCOSA SHOWING OCCASIONAL INTRAMUCOSAL LYMPHOID AGGREGATES, NO PATHOLOGIC ALTERATION, SEE COMMENT.    B.  Ascending Colon, Polyp,  Polypectomy:  FRAGMENTS OF TUBULAR ADENOMA(S).    C.  Transverse Colon, Polyp, Polypectomy:  FRAGMENTS OF TUBULAR ADENOMA(S).      PERTINENT IMAGING  CT abdomen pelvis w IV contrast 9/25/2021 [facility]  IMPRESSION:  Acute uncomplicated sigmoid diverticulitis.    Assessment/Plan    Assessment & Plan  Colon cancer screening  Due in March 2025  No red flag symptoms  Orders:    Colonoscopy Screening; High Risk Patient; Hx adenomas; Future     Follow up as needed      Janine Abreu, SHAYLEE-CNP  11/05/24

## 2024-11-05 ENCOUNTER — APPOINTMENT (OUTPATIENT)
Dept: GASTROENTEROLOGY | Facility: CLINIC | Age: 69
End: 2024-11-05
Payer: MEDICARE

## 2024-11-05 VITALS
BODY MASS INDEX: 43.24 KG/M2 | SYSTOLIC BLOOD PRESSURE: 150 MMHG | DIASTOLIC BLOOD PRESSURE: 93 MMHG | HEIGHT: 62 IN | OXYGEN SATURATION: 97 % | HEART RATE: 85 BPM | WEIGHT: 235 LBS

## 2024-11-05 DIAGNOSIS — Z12.11 COLON CANCER SCREENING: Primary | ICD-10-CM

## 2024-11-05 DIAGNOSIS — D12.6 ADENOMATOUS POLYP OF COLON, UNSPECIFIED PART OF COLON: ICD-10-CM

## 2024-11-05 PROCEDURE — 1036F TOBACCO NON-USER: CPT

## 2024-11-05 PROCEDURE — 3008F BODY MASS INDEX DOCD: CPT

## 2024-11-05 PROCEDURE — 3080F DIAST BP >= 90 MM HG: CPT

## 2024-11-05 PROCEDURE — 1157F ADVNC CARE PLAN IN RCRD: CPT

## 2024-11-05 PROCEDURE — 99203 OFFICE O/P NEW LOW 30 MIN: CPT

## 2024-11-05 PROCEDURE — 3077F SYST BP >= 140 MM HG: CPT

## 2024-11-05 PROCEDURE — 1160F RVW MEDS BY RX/DR IN RCRD: CPT

## 2024-11-05 PROCEDURE — 1159F MED LIST DOCD IN RCRD: CPT

## 2024-11-05 ASSESSMENT — ENCOUNTER SYMPTOMS: ROS GI COMMENTS: SEE HPI

## 2024-11-05 NOTE — PATIENT INSTRUCTIONS
Colonoscopy ordered, due in March 2025  Follow up as needed  Continue Metamucil and stool softener daily

## 2024-11-06 ENCOUNTER — APPOINTMENT (OUTPATIENT)
Dept: PRIMARY CARE | Facility: CLINIC | Age: 69
End: 2024-11-06
Payer: MEDICARE

## 2024-11-06 VITALS
HEART RATE: 95 BPM | DIASTOLIC BLOOD PRESSURE: 82 MMHG | BODY MASS INDEX: 42.96 KG/M2 | OXYGEN SATURATION: 100 % | SYSTOLIC BLOOD PRESSURE: 132 MMHG | WEIGHT: 233 LBS | TEMPERATURE: 96.8 F

## 2024-11-06 DIAGNOSIS — N18.32 STAGE 3B CHRONIC KIDNEY DISEASE (MULTI): ICD-10-CM

## 2024-11-06 DIAGNOSIS — R73.03 PRE-DIABETES: ICD-10-CM

## 2024-11-06 DIAGNOSIS — L97.521 ULCER OF LEFT FOOT, LIMITED TO BREAKDOWN OF SKIN: Primary | ICD-10-CM

## 2024-11-06 DIAGNOSIS — I10 ESSENTIAL (PRIMARY) HYPERTENSION: ICD-10-CM

## 2024-11-06 PROCEDURE — 99214 OFFICE O/P EST MOD 30 MIN: CPT | Performed by: FAMILY MEDICINE

## 2024-11-06 PROCEDURE — 3075F SYST BP GE 130 - 139MM HG: CPT | Performed by: FAMILY MEDICINE

## 2024-11-06 PROCEDURE — 1157F ADVNC CARE PLAN IN RCRD: CPT | Performed by: FAMILY MEDICINE

## 2024-11-06 PROCEDURE — 3079F DIAST BP 80-89 MM HG: CPT | Performed by: FAMILY MEDICINE

## 2024-11-06 NOTE — PROGRESS NOTES
Subjective   Patient ID: Jojo Martin is a 69 y.o. female who presents for Results and Toe Problem (Sore on the L foot between the Great and 2nd toe.  Painful without anything between to cushion.).    HPI SEE ABOVE     Review of Systems   Constitutional:  Negative for activity change, appetite change, fever and unexpected weight change.   HENT:  Negative for congestion, ear pain, postnasal drip, rhinorrhea, sinus pressure and sore throat.    Eyes:  Negative for discharge, itching and visual disturbance.   Respiratory:  Negative for cough, shortness of breath and wheezing.    Cardiovascular:  Negative for chest pain, palpitations and leg swelling.   Gastrointestinal:  Negative for abdominal pain, blood in stool, constipation, diarrhea, nausea and vomiting.   Endocrine: Negative for cold intolerance, heat intolerance and polyuria.   Genitourinary:  Negative for dysuria, flank pain and hematuria.   Musculoskeletal:  Positive for arthralgias. Negative for gait problem, joint swelling and myalgias.        FEET PAIN    Skin:  Positive for wound. Negative for rash.   Allergic/Immunologic: Negative for environmental allergies and food allergies.   Neurological:  Positive for numbness. Negative for dizziness, syncope, weakness, light-headedness and headaches.   Psychiatric/Behavioral:  Negative for dysphoric mood and sleep disturbance. The patient is not nervous/anxious.        Objective   /82   Pulse 95   Temp 36 °C (96.8 °F)   Wt 106 kg (233 lb)   SpO2 100%   BMI 42.96 kg/m²     Physical Exam  Vitals and nursing note reviewed.   Constitutional:       Appearance: Normal appearance.   HENT:      Head: Normocephalic.      Mouth/Throat:      Mouth: Mucous membranes are moist.   Cardiovascular:      Rate and Rhythm: Normal rate and regular rhythm.      Pulses: Normal pulses.           Dorsalis pedis pulses are 2+ on the left side.        Posterior tibial pulses are 2+ on the left side.      Heart sounds: Normal  heart sounds. No murmur heard.     No friction rub. No gallop.   Pulmonary:      Effort: Pulmonary effort is normal. No respiratory distress.      Breath sounds: Normal breath sounds. No wheezing.   Abdominal:      General: Bowel sounds are normal. There is no distension.      Palpations: Abdomen is soft.      Tenderness: There is no abdominal tenderness.   Musculoskeletal:         General: Normal range of motion.      Left foot: Deformity and bunion present.   Feet:      Left foot:      Protective Sensation: 3 sites tested.  3 sites sensed.      Skin integrity: Ulcer, skin breakdown and erythema present.      Comments: Sore I between first and second toe   Pressure   Skin:     General: Skin is warm and dry.      Capillary Refill: Capillary refill takes less than 2 seconds.      Findings: Lesion present.      Comments: SOUND IN BETWEEN TOES PRESSURE ULCER DUE TO FOOT DEFORMITY    Neurological:      General: No focal deficit present.      Mental Status: She is alert and oriented to person, place, and time.   Psychiatric:         Mood and Affect: Mood normal.         Assessment/Plan   Problem List Items Addressed This Visit             ICD-10-CM    Essential (primary) hypertension I10    Stage 3b chronic kidney disease (Multi) N18.32    Pre-diabetes R73.03    Ulcer of left foot, limited to breakdown of skin - Primary L97.521

## 2024-11-06 NOTE — PATIENT INSTRUCTIONS
You are doing well  Lower carbs for the pre diabetes   Lower fat for the cholesterol which is much improved   DEXA SCAN SHOWS NO OSTEOPOROSIS    6  MONTHS FOLLOW   SOONER WHEN NEEDED

## 2024-11-19 PROBLEM — L97.521 ULCER OF LEFT FOOT, LIMITED TO BREAKDOWN OF SKIN: Status: ACTIVE | Noted: 2024-11-19

## 2024-11-19 ASSESSMENT — ENCOUNTER SYMPTOMS
WHEEZING: 0
FEVER: 0
EYE DISCHARGE: 0
VOMITING: 0
WEAKNESS: 0
DIZZINESS: 0
MYALGIAS: 0
ACTIVITY CHANGE: 0
HEMATURIA: 0
SHORTNESS OF BREATH: 0
LIGHT-HEADEDNESS: 0
DIARRHEA: 0
NAUSEA: 0
RHINORRHEA: 0
DYSURIA: 0
BLOOD IN STOOL: 0
SLEEP DISTURBANCE: 0
SINUS PRESSURE: 0
ARTHRALGIAS: 1
HEADACHES: 0
SORE THROAT: 0
JOINT SWELLING: 0
PALPITATIONS: 0
FLANK PAIN: 0
ABDOMINAL PAIN: 0
NUMBNESS: 1
DYSPHORIC MOOD: 0
APPETITE CHANGE: 0
COUGH: 0
NERVOUS/ANXIOUS: 0
WOUND: 1
UNEXPECTED WEIGHT CHANGE: 0
EYE ITCHING: 0
CONSTIPATION: 0

## 2024-12-20 ENCOUNTER — HOSPITAL ENCOUNTER (OUTPATIENT)
Dept: RADIOLOGY | Facility: HOSPITAL | Age: 69
Discharge: HOME | End: 2024-12-20
Payer: MEDICARE

## 2024-12-20 ENCOUNTER — APPOINTMENT (OUTPATIENT)
Dept: ORTHOPEDIC SURGERY | Facility: CLINIC | Age: 69
End: 2024-12-20
Payer: MEDICARE

## 2024-12-20 DIAGNOSIS — M25.551 PAIN OF RIGHT HIP: ICD-10-CM

## 2024-12-20 PROCEDURE — 73502 X-RAY EXAM HIP UNI 2-3 VIEWS: CPT | Mod: RT

## 2024-12-20 PROCEDURE — 99213 OFFICE O/P EST LOW 20 MIN: CPT | Performed by: ORTHOPAEDIC SURGERY

## 2024-12-20 PROCEDURE — G2211 COMPLEX E/M VISIT ADD ON: HCPCS | Performed by: ORTHOPAEDIC SURGERY

## 2024-12-20 PROCEDURE — 1157F ADVNC CARE PLAN IN RCRD: CPT | Performed by: ORTHOPAEDIC SURGERY

## 2024-12-20 PROCEDURE — 1036F TOBACCO NON-USER: CPT | Performed by: ORTHOPAEDIC SURGERY

## 2024-12-20 PROCEDURE — 1159F MED LIST DOCD IN RCRD: CPT | Performed by: ORTHOPAEDIC SURGERY

## 2024-12-20 PROCEDURE — 1160F RVW MEDS BY RX/DR IN RCRD: CPT | Performed by: ORTHOPAEDIC SURGERY

## 2024-12-20 ASSESSMENT — PAIN - FUNCTIONAL ASSESSMENT: PAIN_FUNCTIONAL_ASSESSMENT: NO/DENIES PAIN

## 2024-12-20 NOTE — PROGRESS NOTES
This is a consultation from Dr. Ronel Greenfield DO for   Chief Complaint   Patient presents with    Right Hip - Follow-up     6/10/24 RT SNEHA       This is a 69 y.o. female who presents for follow up for her right hip.  Patient is about 4 months out from total hip replacement. she is doing very well.  Back to all normal activities.  Walking without difficulty.  No drainage from her incision no fevers no chills.  Not using any assistive devices or pain medications for her hip.      Physical Exam    There has been no interval change in this patient's past medical, surgical, medications, allergies, family history or social history since the most recent visit to a provider within our department. 14 point review of systems was performed, reviewed, and negative except for pertinent positives documented in the history of present illness.     Constitutional: well developed, well nourished female in no acute distress  Psychiatric: normal mood, appropriate affect  Eyes: sclera anicteric  HENT: normocephalic/atraumatic  CV: regular rate and rhythm   Respiratory: non labored breathing  Integumentary: no rash  Neurological: moves all extremities    Right hip examination: Well-healed surgical incision no erythema no drainage, no pain with range of motion, neurovascular intact distally.    Xrays were ordered by me, they were reviewed and independently interpreted by me today, they show stable right total hip arthroplasty no evidence of any fracture loosening or dislocation.    Procedures      Impression/Plan: This is a 69 y.o. female who is about 4 months out from right total hip.  Weightbearing as tolerated activity as tolerated, plan for routine radiographic follow-up.    BMI Readings from Last 1 Encounters:   11/06/24 42.96 kg/m²      Lab Results   Component Value Date    CREATININE 1.07 (H) 10/25/2024     Tobacco Use: Low Risk  (12/20/2024)    Patient History     Smoking Tobacco Use: Never     Smokeless Tobacco Use: Never      Passive Exposure: Not on file      Computed MELD 3.0 unavailable. One or more values for this score either were not found within the given timeframe or did not fit some other criterion.  Computed MELD-Na unavailable. One or more values for this score either were not found within the given timeframe or did not fit some other criterion.       Lab Results   Component Value Date    HGBA1C 6.2 (H) 10/25/2024     Lab Results   Component Value Date    STAPHMRSASCR (A) 05/23/2024     Isolated: Methicillin Susceptible Staphylococcus aureus (MSSA)

## 2024-12-23 ENCOUNTER — APPOINTMENT (OUTPATIENT)
Dept: RADIOLOGY | Facility: HOSPITAL | Age: 69
End: 2024-12-23
Payer: MEDICARE

## 2024-12-27 ENCOUNTER — APPOINTMENT (OUTPATIENT)
Dept: RADIOLOGY | Facility: HOSPITAL | Age: 69
End: 2024-12-27
Payer: MEDICARE

## 2025-01-23 ENCOUNTER — TELEPHONE (OUTPATIENT)
Dept: ORTHOPEDIC SURGERY | Facility: CLINIC | Age: 70
End: 2025-01-23
Payer: MEDICARE

## 2025-01-23 NOTE — TELEPHONE ENCOUNTER
06/10/24 rt heavenly  Patient is at the point where she could get a shoe lift. She wanted to know if Dr. Corona had anyone/anywhere in mind for her to go?

## 2025-02-07 ENCOUNTER — APPOINTMENT (OUTPATIENT)
Dept: PRIMARY CARE | Facility: CLINIC | Age: 70
End: 2025-02-07
Payer: MEDICARE

## 2025-02-07 VITALS
BODY MASS INDEX: 41.88 KG/M2 | HEART RATE: 102 BPM | DIASTOLIC BLOOD PRESSURE: 80 MMHG | SYSTOLIC BLOOD PRESSURE: 130 MMHG | WEIGHT: 227.6 LBS | TEMPERATURE: 97.5 F | OXYGEN SATURATION: 91 % | HEIGHT: 62 IN

## 2025-02-07 DIAGNOSIS — Z00.00 ROUTINE GENERAL MEDICAL EXAMINATION AT HEALTH CARE FACILITY: Primary | ICD-10-CM

## 2025-02-07 PROCEDURE — 1157F ADVNC CARE PLAN IN RCRD: CPT | Performed by: FAMILY MEDICINE

## 2025-02-07 PROCEDURE — 3008F BODY MASS INDEX DOCD: CPT | Performed by: FAMILY MEDICINE

## 2025-02-07 PROCEDURE — 3079F DIAST BP 80-89 MM HG: CPT | Performed by: FAMILY MEDICINE

## 2025-02-07 PROCEDURE — 1170F FXNL STATUS ASSESSED: CPT | Performed by: FAMILY MEDICINE

## 2025-02-07 PROCEDURE — G0439 PPPS, SUBSEQ VISIT: HCPCS | Performed by: FAMILY MEDICINE

## 2025-02-07 PROCEDURE — 3075F SYST BP GE 130 - 139MM HG: CPT | Performed by: FAMILY MEDICINE

## 2025-02-07 ASSESSMENT — ENCOUNTER SYMPTOMS
LOSS OF SENSATION IN FEET: 0
OCCASIONAL FEELINGS OF UNSTEADINESS: 1
DEPRESSION: 0

## 2025-02-07 ASSESSMENT — PATIENT HEALTH QUESTIONNAIRE - PHQ9
1. LITTLE INTEREST OR PLEASURE IN DOING THINGS: SEVERAL DAYS
2. FEELING DOWN, DEPRESSED OR HOPELESS: NOT AT ALL
SUM OF ALL RESPONSES TO PHQ9 QUESTIONS 1 AND 2: 1

## 2025-02-07 ASSESSMENT — ACTIVITIES OF DAILY LIVING (ADL)
MANAGING_FINANCES: INDEPENDENT
DOING_HOUSEWORK: INDEPENDENT
BATHING: INDEPENDENT
TAKING_MEDICATION: INDEPENDENT
DRESSING: INDEPENDENT
GROCERY_SHOPPING: INDEPENDENT

## 2025-02-07 NOTE — PROGRESS NOTES
"Subjective   Reason for Visit: Jojo Martin is an 69 y.o. female here for a Medicare Wellness visit.               HPIRESOLVED GI FLU  FEELS WELL NOW     Patient Care Team:  Ronel Greenfield DO as PCP - General  Ronel Greenfield DO as PCP - [place] [employer] PCP     Review of Systems   Constitutional:  Negative for activity change, appetite change, fever and unexpected weight change.   HENT:  Negative for congestion, ear pain, postnasal drip, rhinorrhea, sinus pressure and sore throat.    Eyes:  Negative for discharge, itching and visual disturbance.   Respiratory:  Negative for cough, shortness of breath and wheezing.    Cardiovascular:  Negative for chest pain, palpitations and leg swelling.   Gastrointestinal:  Negative for abdominal pain, blood in stool, constipation, diarrhea, nausea and vomiting.        HAD GI FLU BETTER NOW    Endocrine: Negative for cold intolerance, heat intolerance and polyuria.   Genitourinary:  Negative for dysuria, flank pain and hematuria.   Musculoskeletal:  Negative for arthralgias, gait problem, joint swelling and myalgias.   Skin:  Negative for rash.   Allergic/Immunologic: Negative for environmental allergies and food allergies.   Neurological:  Negative for dizziness, syncope, weakness, light-headedness, numbness and headaches.   Psychiatric/Behavioral:  Negative for dysphoric mood and sleep disturbance. The patient is not nervous/anxious.        Objective   Vitals:  /80   Pulse 102   Temp 36.4 °C (97.5 °F)   Ht 1.575 m (5' 2\")   Wt 103 kg (227 lb 9.6 oz)   SpO2 91%   BMI 41.63 kg/m²       Physical Exam  Vitals and nursing note reviewed.   Constitutional:       Appearance: Normal appearance.   HENT:      Head: Normocephalic.   Cardiovascular:      Rate and Rhythm: Normal rate and regular rhythm.      Pulses: Normal pulses.      Heart sounds: Normal heart sounds. No murmur heard.     No friction rub. No gallop.   Pulmonary:      Effort: Pulmonary " effort is normal. No respiratory distress.      Breath sounds: Normal breath sounds. No wheezing.   Abdominal:      General: There is no distension.      Palpations: Abdomen is soft.      Tenderness: There is no abdominal tenderness.   Musculoskeletal:         General: No deformity. Normal range of motion.   Skin:     General: Skin is warm and dry.      Capillary Refill: Capillary refill takes less than 2 seconds.   Neurological:      General: No focal deficit present.      Mental Status: She is alert and oriented to person, place, and time.   Psychiatric:         Mood and Affect: Mood normal.         Assessment & Plan  Routine general medical examination at health care facility    Orders:    1 Year Follow Up In Primary Care - Wellness Exam; Future

## 2025-02-07 NOTE — PATIENT INSTRUCTIONS
MAMMOGRAM  IS UTD   COLONOSCOPY IS PLANNED   ALL VACCINE ARE GOOD     CONSIDER THE RSV/RESPIRATORY SYNCYTIAL VISUS   WELLNESS YRLY  SEE REGULAR AT 6 MONTHS ABOUT BLOOD PRESSURE

## 2025-02-11 PROBLEM — Z00.00 ROUTINE GENERAL MEDICAL EXAMINATION AT HEALTH CARE FACILITY: Status: ACTIVE | Noted: 2025-02-11

## 2025-02-11 ASSESSMENT — ENCOUNTER SYMPTOMS
UNEXPECTED WEIGHT CHANGE: 0
SLEEP DISTURBANCE: 0
DYSURIA: 0
WEAKNESS: 0
ACTIVITY CHANGE: 0
FLANK PAIN: 0
EYE DISCHARGE: 0
VOMITING: 0
HEADACHES: 0
APPETITE CHANGE: 0
ABDOMINAL PAIN: 0
NAUSEA: 0
SHORTNESS OF BREATH: 0
NERVOUS/ANXIOUS: 0
HEMATURIA: 0
EYE ITCHING: 0
DIZZINESS: 0
BLOOD IN STOOL: 0
PALPITATIONS: 0
ARTHRALGIAS: 0
JOINT SWELLING: 0
WHEEZING: 0
SINUS PRESSURE: 0
DYSPHORIC MOOD: 0
SORE THROAT: 0
COUGH: 0
LIGHT-HEADEDNESS: 0
CONSTIPATION: 0
NUMBNESS: 0
FEVER: 0
MYALGIAS: 0
RHINORRHEA: 0
DIARRHEA: 0

## 2025-02-17 DIAGNOSIS — L71.9 ROSACEA: ICD-10-CM

## 2025-02-17 RX ORDER — AMOXICILLIN 500 MG/1
500 CAPSULE ORAL DAILY
Qty: 60 CAPSULE | Refills: 3 | Status: SHIPPED | OUTPATIENT
Start: 2025-02-17

## 2025-03-10 ENCOUNTER — ANESTHESIA EVENT (OUTPATIENT)
Dept: GASTROENTEROLOGY | Facility: HOSPITAL | Age: 70
End: 2025-03-10

## 2025-03-10 ENCOUNTER — PRE-ADMISSION TESTING (OUTPATIENT)
Dept: PREADMISSION TESTING | Facility: HOSPITAL | Age: 70
End: 2025-03-10
Payer: MEDICARE

## 2025-03-10 VITALS — WEIGHT: 230 LBS | HEIGHT: 61 IN | BODY MASS INDEX: 43.43 KG/M2

## 2025-03-10 PROBLEM — E66.813 CLASS 3 SEVERE OBESITY IN ADULT: Status: ACTIVE | Noted: 2025-03-10

## 2025-03-10 PROBLEM — E66.01 CLASS 3 SEVERE OBESITY IN ADULT: Status: ACTIVE | Noted: 2025-03-10

## 2025-03-10 RX ORDER — ROPINIROLE 2 MG/1
1 TABLET, FILM COATED ORAL NIGHTLY
COMMUNITY
Start: 2025-02-17

## 2025-03-10 RX ORDER — ASPIRIN 81 MG/1
81 TABLET ORAL DAILY
COMMUNITY

## 2025-03-10 ASSESSMENT — DUKE ACTIVITY SCORE INDEX (DASI)
CAN YOU TAKE CARE OF YOURSELF (EAT, DRESS, BATHE, OR USE TOILET): YES
CAN YOU RUN A SHORT DISTANCE: NO
CAN YOU CLIMB A FLIGHT OF STAIRS OR WALK UP A HILL: NO
CAN YOU WALK A BLOCK OR TWO ON LEVEL GROUND: NO
CAN YOU WALK INDOORS, SUCH AS AROUND YOUR HOUSE: YES
CAN YOU PARTICIPATE IN MODERATE RECREATIONAL ACTIVITIES LIKE GOLF, BOWLING, DANCING, DOUBLES TENNIS OR THROWING A BASEBALL OR FOOTBALL: NO
TOTAL_SCORE: 10.7
CAN YOU DO YARD WORK LIKE RAKING LEAVES, WEEDING OR PUSHING A MOWER: NO
CAN YOU DO LIGHT WORK AROUND THE HOUSE LIKE DUSTING OR WASHING DISHES: YES
CAN YOU HAVE SEXUAL RELATIONS: NO
DASI METS SCORE: 4.1
CAN YOU DO HEAVY WORK AROUND THE HOUSE LIKE SCRUBBING FLOORS OR LIFTING AND MOVING HEAVY FURNITURE: NO
CAN YOU DO MODERATE WORK AROUND THE HOUSE LIKE VACUUMING, SWEEPING FLOORS OR CARRYING GROCERIES: YES
CAN YOU PARTICIPATE IN STRENOUS SPORTS LIKE SWIMMING, SINGLES TENNIS, FOOTBALL, BASKETBALL, OR SKIING: NO

## 2025-03-10 NOTE — ANESTHESIA PREPROCEDURE EVALUATION
Patient: Jojo Martin    Procedure Information       Date/Time: 04/02/25 0905    Scheduled providers: Vivian Gomez MD    Procedure: COLONOSCOPY    Location: [facility]            Relevant Problems   Anesthesia (within normal limits)      Cardiac   (+) Essential (primary) hypertension      Pulmonary   (+) Asthmatic bronchitis (HHS-HCC)      Neuro (within normal limits)      GI   (+) Chronic diarrhea      /Renal (within normal limits)      Liver (within normal limits)      Endocrine   (+) Class 3 severe obesity in adult   (+) Goiter   (+) Pre-diabetes   (+) Thyroid nodule      Hematology   (+) Anemia      Musculoskeletal   (+) Arthritis   (+) Chronic pain syndrome   (+) Primary osteoarthritis of both hips      HEENT (within normal limits)      ID (within normal limits)      Skin (within normal limits)      GYN (within normal limits)      Nervous   (+) Narcolepsy   (+) Restless legs syndrome (RLS)      Genitourinary   (+) Stage 3b chronic kidney disease (Multi)     There were no vitals filed for this visit.    Past Surgical History:   Procedure Laterality Date    BREAST LUMPECTOMY Right     with 25 lymph nodes removed    CHOLECYSTECTOMY      HYSTERECTOMY  08/22/2016    Hysterectomy    KNEE ARTHROPLASTY Bilateral     MEDIPORT INSERTION, SINGLE Left     then removal for chemo    OTHER SURGICAL HISTORY  09/18/2018    Treatment Of The Left Ankle    OTHER SURGICAL HISTORY  09/18/2018    Oral Surgery    REVISION TOTAL HIP ARTHROPLASTY Right 06/10/2024    DR YOUNGBLOOD    VEIN LIGATION AND STRIPPING Left      Past Medical History:   Diagnosis Date    Acquired absence of both cervix and uterus     History of hysterectomy    Acute upper respiratory infection, unspecified 09/09/2019    Acute URI    Anemia     Arthritis     Chronic sinusitis, unspecified 09/24/2019    Sinobronchitis    CKD (chronic kidney disease) stage 3, GFR 30-59 ml/min (Multi)     Delayed emergence from general anesthesia     Goiter     pt  denies    History of recent fall     5/21/24  no injury    Hypertension     Narcolepsy     Other specified postprocedural states 10/11/2018    Status post ORIF of fracture of ankle    Personal history of (healed) traumatic fracture 08/03/2018    History of fracture of left ankle    Personal history of diseases of the skin and subcutaneous tissue     History of erythematous condition    Personal history of malignant neoplasm of breast     History of malignant neoplasm of breast right    Personal history of other diseases of the respiratory system 11/15/2021    History of acute sinusitis    Personal history of other specified conditions 09/29/2021    History of diarrhea    Renal insufficiency     RLS (restless legs syndrome)     Rosacea     Sleep apnea     with CPAP    Unspecified nonsuppurative otitis media, unspecified ear 09/24/2019    Otitis media with effusion    Uses roller walker     Wears dentures     upper and lower full dentures    Wears glasses        Current Outpatient Medications:     amLODIPine (Norvasc) 5 mg tablet, Take 1 tablet (5 mg) by mouth once daily at bedtime., Disp: , Rfl:     amoxicillin (Amoxil) 500 mg capsule, Take 1 capsule (500 mg) by mouth once daily., Disp: 60 capsule, Rfl: 3    amphetamine-dextroamphetamine (Adderall) 10 mg tablet, Take 1 tablet (10 mg) by mouth 3 times a day., Disp: , Rfl:     amphetamine-dextroamphetamine (Adderall) 20 mg tablet, Take 1 tablet (20 mg) by mouth once daily., Disp: , Rfl:     ascorbic acid (Vitamin C) 500 mg tablet, Take 2 tablets (1,000 mg) by mouth once daily., Disp: , Rfl:     magnesium 250 mg tablet, Take 1 tablet (250 mg) by mouth once daily., Disp: , Rfl:     melatonin 3 mg capsule, Take 3 mg by mouth once daily at bedtime., Disp: , Rfl:     methocarbamol (Robaxin) 500 mg tablet, Take 1 tablet (500 mg) by mouth 3 times a day as needed for muscle spasms., Disp: , Rfl:     multivitamin capsule, Take 1 capsule by mouth once daily., Disp: , Rfl:      niacin 500 mg tablet, Take 1 tablet (500 mg) by mouth once daily with breakfast., Disp: , Rfl:     PaxiL 20 mg tablet, TAKE 2 TABLET BY MOUTH EVERY DAY AS DIRECTED, Disp: , Rfl:     pitolisant (Wakix) 17.8 mg tablet, Take 2 tablets by mouth once daily in the morning., Disp: , Rfl:     psyllium (Metamucil) 3.4 gram packet, Take 1 packet by mouth once daily., Disp: , Rfl:     rOPINIRole (Requip) 1 mg tablet, Take 1 tablet (1 mg) by mouth once daily at bedtime., Disp: , Rfl:     walker misc, Rollator for pre and post op stability, Disp: 1 each, Rfl: 0    ZINC ORAL, Take 1 tablet by mouth once daily., Disp: , Rfl:   Prior to Admission medications    Medication Sig Start Date End Date Taking? Authorizing Provider   amLODIPine (Norvasc) 5 mg tablet Take 1 tablet (5 mg) by mouth once daily at bedtime.    Historical Provider, MD   amoxicillin (Amoxil) 500 mg capsule Take 1 capsule (500 mg) by mouth once daily. 2/17/25   Ronel Greenfield,    amphetamine-dextroamphetamine (Adderall) 10 mg tablet Take 1 tablet (10 mg) by mouth 3 times a day.    Historical Provider, MD   amphetamine-dextroamphetamine (Adderall) 20 mg tablet Take 1 tablet (20 mg) by mouth once daily.    Historical Provider, MD   ascorbic acid (Vitamin C) 500 mg tablet Take 2 tablets (1,000 mg) by mouth once daily.    Historical Provider, MD   magnesium 250 mg tablet Take 1 tablet (250 mg) by mouth once daily.    Historical Provider, MD   melatonin 3 mg capsule Take 3 mg by mouth once daily at bedtime.    Historical Provider, MD   methocarbamol (Robaxin) 500 mg tablet Take 1 tablet (500 mg) by mouth 3 times a day as needed for muscle spasms.    Historical Provider, MD   multivitamin capsule Take 1 capsule by mouth once daily.    Historical Provider, MD   niacin 500 mg tablet Take 1 tablet (500 mg) by mouth once daily with breakfast.    Historical Provider, MD   PaxiL 20 mg tablet TAKE 2 TABLET BY MOUTH EVERY DAY AS DIRECTED 12/18/23   Historical Provider, MD    pitolisant (Wakix) 17.8 mg tablet Take 2 tablets by mouth once daily in the morning.    Historical Provider, MD   psyllium (Metamucil) 3.4 gram packet Take 1 packet by mouth once daily.    Historical Provider, MD   rOPINIRole (Requip) 1 mg tablet Take 1 tablet (1 mg) by mouth once daily at bedtime. 6/12/24   Halima Terrazas, APRN-CNP   walker misc Rollator for pre and post op stability 5/23/24   Daivd Corona MD   ZINC ORAL Take 1 tablet by mouth once daily.    Historical Provider, MD     Allergies   Allergen Reactions    Anastrozole Other     Muscle/tissue break down    Dicyclomine Hives    Ibuprofen Unknown     Motrin brand    Levocetirizine Unknown     Burning esophagus    Norethindrone Ac-Eth Estradiol Other     ALL BIRTH CONTROLS!!!!!Reports very high blood pressure with oral contraceptive    Paroxetine Hallucinations     Patient stated she can take the name brand, Paxil, but not the generic.. It is why/how she ended up in hospital    Tamoxifen Other     Suicidal      Social History     Tobacco Use    Smoking status: Never    Smokeless tobacco: Never   Substance Use Topics    Alcohol use: Never         Chemistry    Lab Results   Component Value Date/Time     10/25/2024 1115    K 4.9 10/25/2024 1115     10/25/2024 1115    CO2 27 10/25/2024 1115    BUN 27 (H) 10/25/2024 1115    CREATININE 1.07 (H) 10/25/2024 1115    Lab Results   Component Value Date/Time    CALCIUM 9.1 10/25/2024 1115    ALKPHOS 109 10/25/2024 1115    AST 41 (H) 10/25/2024 1115    ALT 34 10/25/2024 1115    BILITOT 1.0 10/25/2024 1115          Lab Results   Component Value Date/Time    WBC 5.7 07/12/2024 1626    HGB 11.0 (L) 07/12/2024 1626    HCT 35.1 (L) 07/12/2024 1626     07/12/2024 1626     Lab Results   Component Value Date/Time    PROTIME 11.3 07/16/2018 0941    INR 1.1 07/16/2018 0941     No results found for this or any previous visit (from the past 4464 hours).  No results found for this or any previous visit  from the past 1095 days.    Clinical information reviewed:                 Chart reviewed.  No clearances ordered.      NPO Detail:  No data recorded     PHYSICAL EXAM    Anesthesia Plan

## 2025-03-10 NOTE — PREPROCEDURE INSTRUCTIONS
Medication List            Accurate as of March 10, 2025  8:45 AM. Always use your most recent med list.                amLODIPine 5 mg tablet  Commonly known as: Norvasc  Medication Adjustments for Surgery: Do Not take on the morning of surgery     amoxicillin 500 mg capsule  Commonly known as: Amoxil  Take 1 capsule (500 mg) by mouth once daily.  Medication Adjustments for Surgery: Do Not take on the morning of surgery     * amphetamine-dextroamphetamine 20 mg tablet  Commonly known as: Adderall  Medication Adjustments for Surgery: Do Not take on the morning of surgery     * amphetamine-dextroamphetamine 10 mg tablet  Commonly known as: Adderall  Medication Adjustments for Surgery: Do Not take on the morning of surgery     ascorbic acid 500 mg tablet  Commonly known as: Vitamin C  Additional Medication Adjustments for Surgery: Take last dose 7 days before surgery     aspirin 81 mg EC tablet  Medication Adjustments for Surgery: Do Not take on the morning of surgery     magnesium 250 mg tablet  Medication Adjustments for Surgery: Do Not take on the morning of surgery     melatonin 3 mg capsule  Medication Adjustments for Surgery: Do Not take on the morning of surgery     multivitamin capsule  Additional Medication Adjustments for Surgery: Take last dose 7 days before surgery     niacin 500 mg tablet  Additional Medication Adjustments for Surgery: Take last dose 7 days before surgery     PARoxetine 20 mg tablet  Commonly known as: Paxil  Medication Adjustments for Surgery: Do Not take on the morning of surgery     psyllium 3.4 gram packet  Commonly known as: Metamucil  Medication Adjustments for Surgery: Do Not take on the morning of surgery     rOPINIRole 2 mg tablet  Commonly known as: Requip  Medication Adjustments for Surgery: Do Not take on the morning of surgery     Wakix 17.8 mg tablet  Generic drug: pitolisant  Medication Adjustments for Surgery: Do Not take on the morning of surgery     walker  misc  Rollator for pre and post op stability  Medication Adjustments for Surgery: Take/Use as prescribed     ZINC ORAL  Additional Medication Adjustments for Surgery: Take last dose 7 days before surgery           * This list has 2 medication(s) that are the same as other medications prescribed for you. Read the directions carefully, and ask your doctor or other care provider to review them with you.                           NPO Instructions:     Follow PREP instructions.   Day before procedure -CLEAR LIQUIDS ONLY, No dairy products, NOTHING RED OR PURPLE.  Take 1st part of prep- Evening Before Procedure.  Drink lots of liquids.  Day of Procedure- 3-4am Take 2nd Part of Prep.     STOP DRINKING 3 HOURS PRIOR TO PROCEDURE.     **AFTER MIDNIGHT - ONLY WATER, BLACK COFFEE, TEA, GATORADE, CLEAR SODA**  NO GUM, CANDY OR MINTS IN THE MORNING!     Additional Instructions:      Review your medication instructions, take indicated medications and STOP those when applicable.  Wear comfortable, loose fitting clothing.  Please remove ALL jewelry and body piercing's.   All valuables should be left at home.  Bring a glass case or container/solution for contacts.  Bring Photo ID and Insurance card.     Park in back of hospital by ER. Come up to Second floor-OUTPATIENT dept to check-in.    You MUST have an adult  with you. NO DRIVING FOR 24 HOURS AFTER SURGERY.     If you get ill at all the week before your procedure- CALL YOUR DOCTOR/SURGEON.  Any illness might lead to your procedure being delayed.       Call Outpatient dept at [phone] between 1-3pm the day before your procedure (Friday for Monday procedure), for your arrival time.

## 2025-03-26 ENCOUNTER — APPOINTMENT (OUTPATIENT)
Dept: GASTROENTEROLOGY | Facility: HOSPITAL | Age: 70
End: 2025-03-26
Payer: MEDICARE

## 2025-04-02 ENCOUNTER — APPOINTMENT (OUTPATIENT)
Dept: GASTROENTEROLOGY | Facility: HOSPITAL | Age: 70
End: 2025-04-02
Payer: MEDICARE

## 2025-04-02 ENCOUNTER — ANESTHESIA (OUTPATIENT)
Dept: GASTROENTEROLOGY | Facility: HOSPITAL | Age: 70
End: 2025-04-02

## 2025-05-06 ENCOUNTER — APPOINTMENT (OUTPATIENT)
Dept: PRIMARY CARE | Facility: CLINIC | Age: 70
End: 2025-05-06
Payer: MEDICARE

## 2025-05-06 VITALS
OXYGEN SATURATION: 93 % | TEMPERATURE: 96.6 F | BODY MASS INDEX: 42.63 KG/M2 | WEIGHT: 225.6 LBS | DIASTOLIC BLOOD PRESSURE: 72 MMHG | SYSTOLIC BLOOD PRESSURE: 134 MMHG | HEART RATE: 104 BPM

## 2025-05-06 DIAGNOSIS — R73.03 PRE-DIABETES: ICD-10-CM

## 2025-05-06 LAB — POC HEMOGLOBIN A1C: 5.8 % (ref 4.2–6.5)

## 2025-05-06 PROCEDURE — 99214 OFFICE O/P EST MOD 30 MIN: CPT | Performed by: FAMILY MEDICINE

## 2025-05-06 PROCEDURE — 3075F SYST BP GE 130 - 139MM HG: CPT | Performed by: FAMILY MEDICINE

## 2025-05-06 PROCEDURE — 3078F DIAST BP <80 MM HG: CPT | Performed by: FAMILY MEDICINE

## 2025-05-06 PROCEDURE — 1159F MED LIST DOCD IN RCRD: CPT | Performed by: FAMILY MEDICINE

## 2025-05-06 PROCEDURE — 83036 HEMOGLOBIN GLYCOSYLATED A1C: CPT | Performed by: FAMILY MEDICINE

## 2025-05-06 NOTE — PROGRESS NOTES
Subjective   Patient ID: Jojo Martin is a 69 y.o. female who presents for Follow-up (A1c check/NEEDING NEW COLONOSCOPY REFERRAL) and Diabetes (Last done 10/25/24-6.2/Today is 5.8).  HPI GOOD DIABETIC CONTROL  4-2-15 HAD TO CX HER SCOPE   CALL TO RESCHEDULE     Review of Systems   Constitutional:  Negative for activity change, appetite change, fever and unexpected weight change.   HENT:  Negative for congestion, ear pain, postnasal drip, rhinorrhea, sinus pressure and sore throat.    Eyes:  Negative for discharge, itching and visual disturbance.   Respiratory:  Negative for cough, shortness of breath and wheezing.    Cardiovascular:  Negative for chest pain, palpitations and leg swelling.   Gastrointestinal:  Negative for abdominal pain, blood in stool, constipation, diarrhea, nausea and vomiting.   Endocrine: Negative for cold intolerance, heat intolerance and polyuria.   Genitourinary:  Negative for dysuria, flank pain and hematuria.   Musculoskeletal:  Negative for arthralgias, gait problem, joint swelling and myalgias.   Skin:  Negative for rash.   Allergic/Immunologic: Negative for environmental allergies and food allergies.   Neurological:  Negative for dizziness, syncope, weakness, light-headedness, numbness and headaches.   Psychiatric/Behavioral:  Negative for dysphoric mood and sleep disturbance. The patient is not nervous/anxious.        Objective   Physical Exam  Vitals (stable weight) and nursing note reviewed.   Constitutional:       Appearance: Normal appearance. She is obese.   HENT:      Head: Normocephalic.      Mouth/Throat:      Mouth: Mucous membranes are moist.   Cardiovascular:      Rate and Rhythm: Normal rate and regular rhythm.      Pulses: Normal pulses.      Heart sounds: Normal heart sounds. No murmur heard.     No friction rub. No gallop.   Pulmonary:      Effort: Pulmonary effort is normal. No respiratory distress.      Breath sounds: Normal breath sounds. No wheezing.   Abdominal:       General: There is no distension.      Palpations: Abdomen is soft.      Tenderness: There is no abdominal tenderness.   Musculoskeletal:         General: No deformity. Normal range of motion.   Skin:     General: Skin is warm and dry.      Capillary Refill: Capillary refill takes less than 2 seconds.   Neurological:      General: No focal deficit present.      Mental Status: She is alert and oriented to person, place, and time.   Psychiatric:         Mood and Affect: Mood normal.         Assessment/Plan   Problem List Items Addressed This Visit       Pre-diabetes    Relevant Orders    POCT glycosylated hemoglobin (Hb A1C) manually resulted (Completed)          Ronel Greenfield DO

## 2025-05-09 ENCOUNTER — TELEPHONE (OUTPATIENT)
Dept: ORTHOPEDIC SURGERY | Facility: CLINIC | Age: 70
End: 2025-05-09
Payer: MEDICARE

## 2025-05-12 ASSESSMENT — ENCOUNTER SYMPTOMS
ARTHRALGIAS: 0
NERVOUS/ANXIOUS: 0
SINUS PRESSURE: 0
MYALGIAS: 0
SLEEP DISTURBANCE: 0
WEAKNESS: 0
SHORTNESS OF BREATH: 0
DIZZINESS: 0
VOMITING: 0
RHINORRHEA: 0
APPETITE CHANGE: 0
LIGHT-HEADEDNESS: 0
NUMBNESS: 0
BLOOD IN STOOL: 0
COUGH: 0
CONSTIPATION: 0
UNEXPECTED WEIGHT CHANGE: 0
ABDOMINAL PAIN: 0
DYSPHORIC MOOD: 0
ACTIVITY CHANGE: 0
DIARRHEA: 0
SORE THROAT: 0
HEMATURIA: 0
FLANK PAIN: 0
FEVER: 0
JOINT SWELLING: 0
EYE DISCHARGE: 0
HEADACHES: 0
EYE ITCHING: 0
WHEEZING: 0
DYSURIA: 0
NAUSEA: 0
PALPITATIONS: 0

## 2025-05-30 DIAGNOSIS — D12.6 ADENOMATOUS POLYP OF COLON, UNSPECIFIED PART OF COLON: ICD-10-CM

## 2025-07-09 ENCOUNTER — APPOINTMENT (OUTPATIENT)
Dept: PODIATRY | Facility: CLINIC | Age: 70
End: 2025-07-09
Payer: MEDICARE

## 2025-07-09 DIAGNOSIS — M20.5X9 CONTRACTURE OF TOE JOINT: ICD-10-CM

## 2025-07-09 DIAGNOSIS — R26.2 DIFFICULTY WALKING: ICD-10-CM

## 2025-07-09 DIAGNOSIS — M79.671 FOOT PAIN, BILATERAL: Primary | ICD-10-CM

## 2025-07-09 DIAGNOSIS — M21.42 PES PLANUS OF BOTH FEET: ICD-10-CM

## 2025-07-09 DIAGNOSIS — M21.70 ACQUIRED UNEQUAL LIMB LENGTH: ICD-10-CM

## 2025-07-09 DIAGNOSIS — M21.41 PES PLANUS OF BOTH FEET: ICD-10-CM

## 2025-07-09 DIAGNOSIS — M79.672 FOOT PAIN, BILATERAL: Primary | ICD-10-CM

## 2025-07-09 PROCEDURE — 1159F MED LIST DOCD IN RCRD: CPT | Performed by: PODIATRIST

## 2025-07-09 PROCEDURE — 1036F TOBACCO NON-USER: CPT | Performed by: PODIATRIST

## 2025-07-09 PROCEDURE — 1160F RVW MEDS BY RX/DR IN RCRD: CPT | Performed by: PODIATRIST

## 2025-07-09 PROCEDURE — 99203 OFFICE O/P NEW LOW 30 MIN: CPT | Performed by: PODIATRIST

## 2025-07-09 NOTE — PROGRESS NOTES
History of Present Illness:   Patient states they are here for foot pain    Status post hip replacement  States she has uneven gait post replacement    Has been using heel lifts    No other pedal concerns    Would like inserts for shoes  Denies DM    NO other pedal complaints at this time    Past Medical History  Medical History[1]    Medications and Allergies have been reviewed.    Review Of Systems:  GENERAL: No weight loss, malaise or fevers.  HEENT: Negative for frequent or significant headaches,   RESPIRATORY: Negative for cough, wheezing or shortness of breath.  CARDIOVASCULAR: Negative for chest pain, leg swelling or palpitations.    Examination of Both Lower Extremities:   Objective:   Vasc: DP and PT pulses are palpable bilateral.  CFT is less than 3 seconds bilateral.  Skin temperature is warm to cool proximal to distal bilateral.      Neuro: Vibratory, light touch and proprioception are intact bilateral.    Derm: Nails 1-5 bilateral are intact.  Skin is supple with normal texture and turgor noted.     Ortho: Muscle strength is 5/5 for all pedal groups tested.  Contracted digits noted bl. Bunion noted bl. Pes planus noted upon wb.  No edema, erythema or ecchymosis noted  1. Foot pain, bilateral  Custom Orthotics      2. Acquired unequal limb length  Custom Orthotics      3. Difficulty walking  Custom Orthotics      4. Pes planus of both feet        5. Contracture of toe joint          Patient exam and eval  Discussed arthritic changes in feet  Discussed causes, symptoms, aggravating factors and treatment options  Recommend stiff supportive shoe gear  Shoes that do not twist or bend  Recommend powerstep inserts  Gave rx for custom inserts and list of vendors  Would benefit from daily use  Minimize walking barefoot  Patient to follow up if no improvement noted.   Pt in agreement to plan  All questions answered          Verenice Chauhan DPM  [phone]  [facility]  Fax: [phone]       [1]   Past  Medical History:  Diagnosis Date    Acquired absence of both cervix and uterus     History of hysterectomy    Acute upper respiratory infection, unspecified 09/09/2019    Acute URI    Anemia     Arthritis     Chronic sinusitis, unspecified 09/24/2019    Sinobronchitis    CKD (chronic kidney disease) stage 3, GFR 30-59 ml/min (Multi)     Delayed emergence from general anesthesia     Goiter     pt denies    History of recent fall     5/21/24  no injury    Hypertension     Narcolepsy     Other specified postprocedural states 10/11/2018    Status post ORIF of fracture of ankle    Personal history of (healed) traumatic fracture 08/03/2018    History of fracture of left ankle    Personal history of diseases of the skin and subcutaneous tissue     History of erythematous condition    Personal history of malignant neoplasm of breast     History of malignant neoplasm of breast right    Personal history of other diseases of the respiratory system 11/15/2021    History of acute sinusitis    Personal history of other specified conditions 09/29/2021    History of diarrhea    Renal insufficiency     RLS (restless legs syndrome)     Rosacea     Sleep apnea     with CPAP    Unspecified nonsuppurative otitis media, unspecified ear 09/24/2019    Otitis media with effusion    Uses roller walker     Wears dentures     upper and lower full dentures    Wears glasses

## 2025-07-10 ENCOUNTER — TELEPHONE (OUTPATIENT)
Dept: ORTHOPEDIC SURGERY | Facility: CLINIC | Age: 70
End: 2025-07-10
Payer: MEDICARE

## 2025-07-10 DIAGNOSIS — M25.551 PAIN OF RIGHT HIP: Primary | ICD-10-CM

## 2025-07-10 DIAGNOSIS — Z96.641 HISTORY OF TOTAL RIGHT HIP ARTHROPLASTY: Primary | ICD-10-CM

## 2025-07-10 NOTE — TELEPHONE ENCOUNTER
6/10/24 RT SNEHA    Patient called asking for a Rx for shoe lift for RT side.  Previous vendor has measurements and x-rays for a custom insert and patient will deliver to this new company.    Fax Rx: [facility] [facility]  Fax #[phone]    Phone #[phone]

## 2025-07-11 NOTE — TELEPHONE ENCOUNTER
Patient called back and stated she told us the wrong thing she needed yesterday. She needs a bilateral foot orthotic. We will need to fax to [facility].    Fax: [phone]

## 2025-07-14 ENCOUNTER — TELEPHONE (OUTPATIENT)
Dept: PRIMARY CARE | Facility: CLINIC | Age: 70
End: 2025-07-14
Payer: MEDICARE

## 2025-07-17 ENCOUNTER — TELEPHONE (OUTPATIENT)
Dept: ORTHOPEDIC SURGERY | Facility: CLINIC | Age: 70
End: 2025-07-17
Payer: MEDICARE

## 2025-07-17 NOTE — TELEPHONE ENCOUNTER
"Patient called again to state that order for her \"bilateral orthotics\" has not been received by the vendor, "[employer].      I called and left a message with the vendor to verify fax number and confirm they are not receiving our faxes if indeed we have the correct number.  "

## 2025-07-21 ENCOUNTER — APPOINTMENT (OUTPATIENT)
Dept: SLEEP MEDICINE | Facility: CLINIC | Age: 70
End: 2025-07-21
Payer: MEDICARE

## 2025-07-21 VITALS
OXYGEN SATURATION: 97 % | HEART RATE: 98 BPM | HEIGHT: 62 IN | BODY MASS INDEX: 42.33 KG/M2 | SYSTOLIC BLOOD PRESSURE: 132 MMHG | DIASTOLIC BLOOD PRESSURE: 80 MMHG | WEIGHT: 230 LBS

## 2025-07-21 DIAGNOSIS — G47.421 NARCOLEPSY DUE TO UNDERLYING CONDITION WITH CATAPLEXY (HHS-HCC): Primary | ICD-10-CM

## 2025-07-21 DIAGNOSIS — G47.33 OBSTRUCTIVE SLEEP APNEA (ADULT) (PEDIATRIC): ICD-10-CM

## 2025-07-21 DIAGNOSIS — Z79.899 MEDICATION MANAGEMENT: ICD-10-CM

## 2025-07-21 DIAGNOSIS — E83.10 IRON METABOLISM DISORDER: ICD-10-CM

## 2025-07-21 DIAGNOSIS — G25.81 RESTLESS LEGS SYNDROME (RLS): ICD-10-CM

## 2025-07-21 PROCEDURE — 1160F RVW MEDS BY RX/DR IN RCRD: CPT | Performed by: INTERNAL MEDICINE

## 2025-07-21 PROCEDURE — 1159F MED LIST DOCD IN RCRD: CPT | Performed by: INTERNAL MEDICINE

## 2025-07-21 PROCEDURE — 99205 OFFICE O/P NEW HI 60 MIN: CPT | Performed by: INTERNAL MEDICINE

## 2025-07-21 PROCEDURE — G2211 COMPLEX E/M VISIT ADD ON: HCPCS | Performed by: INTERNAL MEDICINE

## 2025-07-21 PROCEDURE — 3075F SYST BP GE 130 - 139MM HG: CPT | Performed by: INTERNAL MEDICINE

## 2025-07-21 PROCEDURE — 1126F AMNT PAIN NOTED NONE PRSNT: CPT | Performed by: INTERNAL MEDICINE

## 2025-07-21 PROCEDURE — 3008F BODY MASS INDEX DOCD: CPT | Performed by: INTERNAL MEDICINE

## 2025-07-21 PROCEDURE — 1036F TOBACCO NON-USER: CPT | Performed by: INTERNAL MEDICINE

## 2025-07-21 PROCEDURE — 3079F DIAST BP 80-89 MM HG: CPT | Performed by: INTERNAL MEDICINE

## 2025-07-21 ASSESSMENT — SLEEP AND FATIGUE QUESTIONNAIRES
HOW LIKELY ARE YOU TO NOD OFF OR FALL ASLEEP IN A CAR, WHILE STOPPED FOR A FEW MINUTES IN TRAFFIC: WOULD NEVER DOZE
HOW LIKELY ARE YOU TO NOD OFF OR FALL ASLEEP WHEN YOU ARE A PASSENGER IN A CAR FOR AN HOUR WITHOUT A BREAK: WOULD NEVER DOZE
HOW LIKELY ARE YOU TO NOD OFF OR FALL ASLEEP WHILE SITTING QUIETLY AFTER LUNCH WITHOUT ALCOHOL: WOULD NEVER DOZE
HOW LIKELY ARE YOU TO NOD OFF OR FALL ASLEEP WHILE LYING DOWN TO REST IN THE AFTERNOON WHEN CIRCUMSTANCES PERMIT: WOULD NEVER DOZE
ESS-CHAD TOTAL SCORE: 2
HOW LIKELY ARE YOU TO NOD OFF OR FALL ASLEEP WHILE SITTING AND READING: SLIGHT CHANCE OF DOZING
SITING INACTIVE IN A PUBLIC PLACE LIKE A CLASS ROOM OR A MOVIE THEATER: WOULD NEVER DOZE
SLEEP_PROBLEM_INTERFERES_DAILY_ACTIVITIES: A LITTLE
WORRIED_DISTRESSED_DUE_TO_SLEEP: NOT AT ALL NOTICEABLE
SLEEP_PROBLEM_NOTICEABLE_TO_OTHERS: A LITTLE
SATISFACTION_WITH_CURRENT_SLEEP_PATTERN: SATISFIED
HOW LIKELY ARE YOU TO NOD OFF OR FALL ASLEEP WHILE WATCHING TV: SLIGHT CHANCE OF DOZING
HOW LIKELY ARE YOU TO NOD OFF OR FALL ASLEEP WHILE SITTING AND TALKING TO SOMEONE: WOULD NEVER DOZE

## 2025-07-21 ASSESSMENT — LIFESTYLE VARIABLES
AUDIT-C TOTAL SCORE: 0
SKIP TO QUESTIONS 9-10: 1
HOW OFTEN DO YOU HAVE A DRINK CONTAINING ALCOHOL: NEVER
HOW MANY STANDARD DRINKS CONTAINING ALCOHOL DO YOU HAVE ON A TYPICAL DAY: PATIENT DOES NOT DRINK
HOW OFTEN DO YOU HAVE SIX OR MORE DRINKS ON ONE OCCASION: NEVER

## 2025-07-21 ASSESSMENT — PAIN SCALES - GENERAL: PAINLEVEL_OUTOF10: 0-NO PAIN

## 2025-07-21 NOTE — ASSESSMENT & PLAN NOTE
Compliance report was received after the note was completed from DME.    Does appear the patient has insufficient management of her obstructive sleep apnea which can contribute to severity of hypersomnolence.  We will discuss this on follow-up.  Appropriate management is recommended to be able to properly evaluate level of hypersomnolence due to comorbid sleep disorders

## 2025-07-21 NOTE — ASSESSMENT & PLAN NOTE
She is currently on ropinirole.  Last ferritin checked was less than 100.  Recommend retesting ferritin and iron studies and replete iron.  The plan is to get her off of ropinirole which causes augmentation and is new recommendation per AASM standards

## 2025-07-21 NOTE — ASSESSMENT & PLAN NOTE
"Very complex case Reviewed multiple sleep studies that had been provided by the patient. She had an MSLT with sleep latency less than 8 minutes with 1 sleep onset REM..  Patient has been managed and treated as having narcolepsy.  It is unclear whether or not this is idiopathic hypersomnolence or narcolepsy. Which is a diagnosis I would give if entirely going off of her MSLT report.  As she did not meet current MSLT criteria for narcolepsy.  We did discuss Xyrem versus Xywav to reduce the amount of stimulants she is using.  She denies cataplexy, hypnagogic/hypnopompic hallucinations or sleep paralysis.  She does report life like dreams and sees a shadow of her \"guardian giovani\" when awake.  I do not have a complete medical record as she is a transfer of care from Dr. Anthony and we do not have access to his records other than what was provided by the patient at today's office visit.  We may need to consider repeating sleep testing once I have been able to manage her other sleep conditions appropriately.  We will continue her current medications and maintain current diagnosis as diagnosed by her prior sleep physician  "

## 2025-07-21 NOTE — PROGRESS NOTES
Subjective   Patient ID: Jojo Martin is a 69 y.o. female who presents for Narcolepsy.    Prior sleep history:      HPI  History of Present Illness  The patient presents for evaluation of narcolepsy, restless legs syndrome, and sleep apnea.    She is currently on Adderall and Wakix for narcolepsy. She has not tried modafinil or Nuvigil. Provigil induced excessive sleepiness, leading her to return home and sleep. She believes her vivid dreams contribute to her fatigue. She does not experience sleep paralysis but occasionally sees shadows, which she attributes to her guardian giovani. She does not experience muscle weakness during sleep onset. She recalls trying a medication requiring administration every 4 hours, which was ineffective. She also tried Reglan, but it lost efficacy over time. She has not tried Xyrem or Xywav.    She has restless leg syndrome and is currently taking Requip 1 mg, which she finds beneficial. She does not experience symptoms during the day but reports discomfort during sleep. She is not taking iron supplements.    She uses a CPAP machine for sleep apnea, obtained from [facility] less than 5 years ago. Diagnosed with sleep apnea in 2018, she replaced her previous machine due to a recall. She reports no issues with her current medication regimen. She typically goes to bed around 1:00 AM and wakes up at 8:15 AM, as she finds it difficult to function if she sleeps more than 6 to 7 hours.    MEDICATIONS  Current: Adderall, Wakix, Requip  Past: Provigil, Reglan       ESS: 2   Insomnia Severity Index  1. Difficulty falling asleep: None  2. Difficulty staying asleep: None  3. Problems waking up too early: None  4. How SATISFIED/DISSATISFIED are you with your CURRENT sleep pattern?: Satisfied  5. How NOTICEABLE to others do you think your sleep problem is in terms of impairing the quality of your life?: A Little  6. How WORRIED/DISTRESSED are you about your current sleep problem?: Not at all  "Noticeable  7. To what extent do you consider your sleep problem to INTERFERE with your daily functioning (e.g. daytime fatigue, mood, ability to function at work/daily chores, concentration, memory, mood, etc.) CURRENTLY?: A Little  DIANNA TS: 0-7 = No clinically significant insomnia 8-14 = Subthreshold insomnia 15-21 = Clinical insomnia (moderate severity) 22-28 = Clinical insomnia (severe): 3   FOSQ: 21    Review of Systems  Review of systems negative except as per HPI  Objective     /80   Pulse 98   Ht 1.562 m (5' 1.5\")   Wt 104 kg (230 lb)   SpO2 97%   BMI 42.75 kg/m²      Physical Exam       PHYSICAL EXAM: GENERAL: alert pleasant and cooperative no acute distress  PSYCH EXAM: alert,oriented, in NAD with a full range of affect, normal behavior and no psychotic features   HEENT: Modified Mallampati III, uvula midline, tonsils 1+, no tongue ridging, nasal turbinates, non-edematous    Results  Ferritin level  The ferritin level was low for restless legs.   A downloaded compliance report was reviewed and was interpreted by myself as follows:  > 4 hour compliance was 53 %, with an average use of 3 hours and 44 minutes, with a residual AHI 4.1 on AutoPAP 4-16 cmH2O, Average pressure 16 cmH2O. Leak is 23 L/min .     Jojo Martin reports good benefit from her device.     Assessment/Plan     Assessment & Plan  Narcolepsy  Currently on Adderall and Wakix. Provigil made her excessively sleepy. She has not tried modafinil, Xyrem, or Xywav. Discussed potential benefits of Xyrem or Xywav, including improved restful sleep and reduced need for stimulants.  Treatment plan: Initiate controlled substance agreement for Adderall.  Clinical decision making: Discussed potential benefits of Xyrem or Xywav, including improved restful sleep and reduced need for stimulants.    Restless legs syndrome  Currently on Requip 1 mg, which she finds effective but has been breaking in half due to its strength. Discussed risks of " "long-term Requip use, including augmentation.  Diagnostic plan: Repeat ferritin level test.  Treatment plan: Initiate iron supplementation if necessary. Consider weaning off Requip and switching to an alternative medication.  Clinical decision making: Discussed risks of long-term Requip use, including augmentation.    Sleep apnea  Uses CPAP machine obtained from [facility] less than 5 years ago. Diagnosed with sleep apnea in 2018, replaced previous machine due to recall. Reports no issues with current medication regimen.     Problem List Items Addressed This Visit           ICD-10-CM    Narcolepsy - Primary G47.419    Very complex case Reviewed multiple sleep studies that had been provided by the patient. She had an MSLT with sleep latency less than 8 minutes with 1 sleep onset REM..  Patient has been managed and treated as having narcolepsy.  It is unclear whether or not this is idiopathic hypersomnolence or narcolepsy. Which is a diagnosis I would give if entirely going off of her MSLT report.  As she did not meet current MSLT criteria for narcolepsy.  We did discuss Xyrem versus Xywav to reduce the amount of stimulants she is using.  She denies cataplexy, hypnagogic/hypnopompic hallucinations or sleep paralysis.  She does report life like dreams and sees a shadow of her \"guardian giovani\" when awake.  I do not have a complete medical record as she is a transfer of care from Dr. Anthony and we do not have access to his records other than what was provided by the patient at today's office visit.  We may need to consider repeating sleep testing once I have been able to manage her other sleep conditions appropriately.  We will continue her current medications and maintain current diagnosis as diagnosed by her prior sleep physician         Restless legs syndrome (RLS) G25.81    She is currently on ropinirole.  Last ferritin checked was less than 100.  Recommend retesting ferritin and iron studies and replete iron.  " The plan is to get her off of ropinirole which causes augmentation and is new recommendation per AASM standards         Obstructive sleep apnea (adult) (pediatric) G47.33    Compliance report was received after the note was completed from DME.    Does appear the patient has insufficient management of her obstructive sleep apnea which can contribute to severity of hypersomnolence.  We will discuss this on follow-up.  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     This medical note was created with the assistance of artificial intelligence (AI) for documentation purposes. The content has been reviewed and confirmed by the healthcare provider for accuracy and completeness. Patient consented to the use of audio recording and use of AI during their visit.

## 2025-07-22 ENCOUNTER — PRE-ADMISSION TESTING (OUTPATIENT)
Dept: PREADMISSION TESTING | Facility: HOSPITAL | Age: 70
End: 2025-07-22
Payer: MEDICARE

## 2025-07-22 ENCOUNTER — ANESTHESIA EVENT (OUTPATIENT)
Dept: GASTROENTEROLOGY | Facility: HOSPITAL | Age: 70
End: 2025-07-22

## 2025-07-22 VITALS — WEIGHT: 230 LBS | BODY MASS INDEX: 43.43 KG/M2 | HEIGHT: 61 IN

## 2025-07-22 SDOH — HEALTH STABILITY: MENTAL HEALTH: CURRENT SMOKER: 0

## 2025-07-22 ASSESSMENT — DUKE ACTIVITY SCORE INDEX (DASI)
CAN YOU PARTICIPATE IN STRENOUS SPORTS LIKE SWIMMING, SINGLES TENNIS, FOOTBALL, BASKETBALL, OR SKIING: NO
CAN YOU DO LIGHT WORK AROUND THE HOUSE LIKE DUSTING OR WASHING DISHES: YES
CAN YOU TAKE CARE OF YOURSELF (EAT, DRESS, BATHE, OR USE TOILET): YES
CAN YOU PARTICIPATE IN MODERATE RECREATIONAL ACTIVITIES LIKE GOLF, BOWLING, DANCING, DOUBLES TENNIS OR THROWING A BASEBALL OR FOOTBALL: NO
CAN YOU WALK INDOORS, SUCH AS AROUND YOUR HOUSE: YES
CAN YOU DO MODERATE WORK AROUND THE HOUSE LIKE VACUUMING, SWEEPING FLOORS OR CARRYING GROCERIES: YES
CAN YOU WALK A BLOCK OR TWO ON LEVEL GROUND: NO
TOTAL_SCORE: 10.7
CAN YOU CLIMB A FLIGHT OF STAIRS OR WALK UP A HILL: NO
CAN YOU DO HEAVY WORK AROUND THE HOUSE LIKE SCRUBBING FLOORS OR LIFTING AND MOVING HEAVY FURNITURE: NO
CAN YOU RUN A SHORT DISTANCE: NO
DASI METS SCORE: 4.1
CAN YOU DO YARD WORK LIKE RAKING LEAVES, WEEDING OR PUSHING A MOWER: NO
CAN YOU HAVE SEXUAL RELATIONS: NO

## 2025-07-22 NOTE — ANESTHESIA PREPROCEDURE EVALUATION
Patient: Jojo Martin    Procedure Information       Date/Time: 08/06/25 1315    Scheduled providers: Vivian Gomez MD    Procedure: COLONOSCOPY    Location: [facility]            Relevant Problems   Anesthesia (within normal limits)      Cardiac   (+) Essential (primary) hypertension      Pulmonary   (+) Asthmatic bronchitis (HHS-HCC)      Neuro (within normal limits)      /Renal (within normal limits)      Liver (within normal limits)      Endocrine   (+) Class 3 severe obesity in adult   (+) Goiter   (+) Pre-diabetes      Musculoskeletal   (+) Arthritis   (+) Chronic pain syndrome   (+) Primary osteoarthritis of both hips      HEENT (within normal limits)      ID (within normal limits)      Skin (within normal limits)      GYN (within normal limits)      Nervous   (+) Narcolepsy   (+) Restless legs syndrome (RLS)      Respiratory   (+) Obstructive sleep apnea (adult) (pediatric)      Genitourinary   (+) Renal insufficiency, mild   (+) Stage 3b chronic kidney disease (Multi)       Clinical information reviewed:                   NPO Detail:  No data recorded     PHYSICAL EXAM    Anesthesia Plan    History of general anesthesia?: yes  History of complications of general anesthesia?: unknown/emergency    ASA 3     MAC     The patient is not a current smoker.    intravenous induction   Anesthetic plan and risks discussed with patient.  Use of blood products discussed with patient who consented to blood products.

## 2025-07-22 NOTE — PREPROCEDURE INSTRUCTIONS
Medication List            Accurate as of July 22, 2025  1:19 PM. Always use your most recent med list.                amLODIPine 5 mg tablet  Commonly known as: Norvasc  Medication Adjustments for Surgery: Take on the morning of surgery     amoxicillin 500 mg capsule  Commonly known as: Amoxil  Take 1 capsule (500 mg) by mouth once daily.  Medication Adjustments for Surgery: Take last dose 1 day (24 hours) before surgery     * amphetamine-dextroamphetamine 20 mg tablet  Commonly known as: Adderall  Medication Adjustments for Surgery: Take last dose 1 day (24 hours) before surgery     * amphetamine-dextroamphetamine 10 mg tablet  Commonly known as: Adderall  Medication Adjustments for Surgery: Take last dose 1 day (24 hours) before surgery     ascorbic acid 500 mg tablet  Commonly known as: Vitamin C  Additional Medication Adjustments for Surgery: Take last dose 7 days before surgery     aspirin 81 mg EC tablet  Medication Adjustments for Surgery: Take last dose 1 day (24 hours) before surgery     magnesium 250 mg tablet  Additional Medication Adjustments for Surgery: Take last dose 7 days before surgery     melatonin 3 mg capsule  Medication Adjustments for Surgery: Take last dose 2 days before surgery     multivitamin capsule  Additional Medication Adjustments for Surgery: Take last dose 7 days before surgery     niacin 500 mg tablet  Additional Medication Adjustments for Surgery: Take last dose 5 days before surgery     PARoxetine 20 mg tablet  Commonly known as: Paxil  Medication Adjustments for Surgery: Take on the morning of surgery     psyllium 3.4 gram packet  Commonly known as: Metamucil  Additional Medication Adjustments for Surgery: Take last dose 5 days before surgery     rOPINIRole 2 mg tablet  Commonly known as: Requip  Medication Adjustments for Surgery: Take last dose 1 day (24 hours) before surgery     Wakix 17.8 mg tablet  Generic drug: pitolisant  Medication Adjustments for Surgery: Take on the  morning of surgery     walker misc  Rollator for pre and post op stability     ZINC ORAL  Additional Medication Adjustments for Surgery: Take last dose 5 days before surgery           * This list has 2 medication(s) that are the same as other medications prescribed for you. Read the directions carefully, and ask your doctor or other care provider to review them with you.                                  NPO Instructions:    Follow instructions.  Best if you can follow a low fiber diet 5 days before your procedure. Which means- No seeds, nuts, beans, raw veggies, corn, popcorn or whole grains.   Day before procedure- CLEAR LIQUIDS ONLY-No dairy products, nothing red/purple.  Take first part of prep- Evening Before Procedure.  Drink lots of liquids.  Day of Procedure- Take Second Part of Prep take 6 hours beforehand. After Midnight the only Clear Liquid allowed is: Water, Black Coffee, Tea, Gatorade and Clear Soda.  STOP DRINKING 3 HOURS PRIOR TO PROCEDURE.  Take medications as instructed.       Additional Instructions:     Review your medication instructions, take indicated medications  Wear  comfortable loose fitting clothing  All jewelry and valuables should be left at home    Park in back of hospital by ER. Come up to Second floor-Outpt dept to check in.  Bring Photo ID and Insurance card,   You MUST have a  with you.  No more than 2 visitors with you please.  There is construction around the hospital- best to take [address] to [facility] to the hospital.   It is recommended to have an adult be with you for 24 hours post procedure.     If you get ill at all before your procedure- CALL YOUR DOCTOR/SURGEON.  We want you in the best shape that is possible. Any sickness might lead to your procedure being delayed.      Call Outpatient dept at [phone] the night before your procedure (Friday for Monday procedure), between 1-3 pm.     **If you start any new medications, especially for weight loss or antibiotics-let us  know. Outpatient dept number is [phone] (M-F 7-3:30p).

## 2025-07-24 ENCOUNTER — RESULTS FOLLOW-UP (OUTPATIENT)
Dept: SLEEP MEDICINE | Facility: CLINIC | Age: 70
End: 2025-07-24
Payer: MEDICARE

## 2025-07-24 LAB
FERRITIN SERPL-MCNC: 56 NG/ML (ref 16–288)
IRON SATN MFR SERPL: 20 % (CALC) (ref 16–45)
IRON SERPL-MCNC: 88 MCG/DL (ref 45–160)
TIBC SERPL-MCNC: 443 MCG/DL (CALC) (ref 250–450)

## 2025-07-25 NOTE — TELEPHONE ENCOUNTER
----- Message from Leonid Lal sent at 7/24/2025  4:56 PM EDT -----  Iron levels low. Needs iron supplementation  ----- Message -----  From: [employer] Results In  Sent: 7/24/2025   9:07 AM EDT  To: Leonid Lal MD

## 2025-07-25 NOTE — TELEPHONE ENCOUNTER
Called patient and left VM regarding lab results.  Sleep nurse phone number [phone] - given to call back to discuss results and plan going forward.

## 2025-07-27 LAB
1OH-MIDAZOLAM UR-MCNC: NEGATIVE NG/ML
7AMINOCLONAZEPAM UR-MCNC: NEGATIVE NG/ML
A-OH ALPRAZ UR-MCNC: NEGATIVE NG/ML
A-OH-TRIAZOLAM UR-MCNC: NEGATIVE NG/ML
AMPHET UR-MCNC: 7846 NG/ML
AMPHETAMINES UR QL: POSITIVE NG/ML
BARBITURATES UR QL: NEGATIVE NG/ML
BZE UR QL: NEGATIVE NG/ML
CODEINE UR-MCNC: NEGATIVE NG/ML
CREAT UR-MCNC: 63.4 MG/DL
DRUG SCREEN COMMENT UR-IMP: ABNORMAL
EDDP UR-MCNC: NEGATIVE NG/ML
FENTANYL UR-MCNC: ABNORMAL NG/ML
HYDROCODONE UR-MCNC: NEGATIVE NG/ML
HYDROMORPHONE UR-MCNC: NEGATIVE NG/ML
LORAZEPAM UR-MCNC: NEGATIVE NG/ML
METHADONE UR-MCNC: NEGATIVE NG/ML
METHAMPHET UR-MCNC: NEGATIVE NG/ML
MORPHINE UR-MCNC: NEGATIVE NG/ML
NORDIAZEPAM UR-MCNC: NEGATIVE NG/ML
NORFENTANYL UR-MCNC: ABNORMAL NG/ML
NORHYDROCODONE UR CFM-MCNC: NEGATIVE NG/ML
NOROXYCODONE UR CFM-MCNC: NEGATIVE NG/ML
NORTRAMADOL UR-MCNC: NEGATIVE NG/ML
OH-ETHYLFLURAZ UR-MCNC: NEGATIVE NG/ML
OXAZEPAM UR-MCNC: NEGATIVE NG/ML
OXIDANTS UR QL: NEGATIVE MCG/ML
OXYCODONE UR CFM-MCNC: NEGATIVE NG/ML
OXYMORPHONE UR CFM-MCNC: NEGATIVE NG/ML
PCP UR QL: NEGATIVE NG/ML
PH UR: 7.1 [PH] (ref 4.5–9)
QUEST 6 ACETYLMORPHINE: ABNORMAL
QUEST NOTES AND COMMENTS: ABNORMAL
QUEST PATIENT HISTORICAL REPORT: ABNORMAL
QUEST ZOLPIDEM: ABNORMAL
TEMAZEPAM UR-MCNC: NEGATIVE NG/ML
THC UR QL: NEGATIVE NG/ML
TRAMADOL UR-MCNC: NEGATIVE NG/ML
ZOLPIDEM PHENYL-4-CARB UR CFM-MCNC: ABNORMAL NG/ML

## 2025-07-28 LAB
1OH-MIDAZOLAM UR-MCNC: NEGATIVE NG/ML
7AMINOCLONAZEPAM UR-MCNC: NEGATIVE NG/ML
A-OH ALPRAZ UR-MCNC: NEGATIVE NG/ML
A-OH-TRIAZOLAM UR-MCNC: NEGATIVE NG/ML
AMPHET UR-MCNC: 7846 NG/ML
AMPHETAMINES UR QL: POSITIVE NG/ML
BARBITURATES UR QL: NEGATIVE NG/ML
BZE UR QL: NEGATIVE NG/ML
CODEINE UR-MCNC: NEGATIVE NG/ML
CREAT UR-MCNC: 63.4 MG/DL
DRUG SCREEN COMMENT UR-IMP: ABNORMAL
EDDP UR-MCNC: NEGATIVE NG/ML
FENTANYL UR-MCNC: NEGATIVE NG/ML
HYDROCODONE UR-MCNC: NEGATIVE NG/ML
HYDROMORPHONE UR-MCNC: NEGATIVE NG/ML
LORAZEPAM UR-MCNC: NEGATIVE NG/ML
METHADONE UR-MCNC: NEGATIVE NG/ML
METHAMPHET UR-MCNC: NEGATIVE NG/ML
MORPHINE UR-MCNC: NEGATIVE NG/ML
NORDIAZEPAM UR-MCNC: NEGATIVE NG/ML
NORFENTANYL UR-MCNC: NEGATIVE NG/ML
NORHYDROCODONE UR CFM-MCNC: NEGATIVE NG/ML
NOROXYCODONE UR CFM-MCNC: NEGATIVE NG/ML
NORTRAMADOL UR-MCNC: NEGATIVE NG/ML
OH-ETHYLFLURAZ UR-MCNC: NEGATIVE NG/ML
OXAZEPAM UR-MCNC: NEGATIVE NG/ML
OXIDANTS UR QL: NEGATIVE MCG/ML
OXYCODONE UR CFM-MCNC: NEGATIVE NG/ML
OXYMORPHONE UR CFM-MCNC: NEGATIVE NG/ML
PCP UR QL: NEGATIVE NG/ML
PH UR: 7.1 [PH] (ref 4.5–9)
QUEST 6 ACETYLMORPHINE: NEGATIVE NG/ML
QUEST NOTES AND COMMENTS: ABNORMAL
QUEST ZOLPIDEM: NEGATIVE NG/ML
TEMAZEPAM UR-MCNC: NEGATIVE NG/ML
THC UR QL: NEGATIVE NG/ML
TRAMADOL UR-MCNC: NEGATIVE NG/ML
ZOLPIDEM PHENYL-4-CARB UR CFM-MCNC: NEGATIVE NG/ML

## 2025-08-01 RX ORDER — SODIUM CHLORIDE, SODIUM LACTATE, POTASSIUM CHLORIDE, CALCIUM CHLORIDE 600; 310; 30; 20 MG/100ML; MG/100ML; MG/100ML; MG/100ML
100 INJECTION, SOLUTION INTRAVENOUS CONTINUOUS
OUTPATIENT
Start: 2025-08-01 | End: 2025-08-01

## 2025-08-06 ENCOUNTER — ANESTHESIA (OUTPATIENT)
Dept: GASTROENTEROLOGY | Facility: HOSPITAL | Age: 70
End: 2025-08-06

## 2025-08-06 ENCOUNTER — APPOINTMENT (OUTPATIENT)
Dept: GASTROENTEROLOGY | Facility: HOSPITAL | Age: 70
End: 2025-08-06
Payer: MEDICARE

## 2025-08-26 ENCOUNTER — TELEPHONE (OUTPATIENT)
Dept: SLEEP MEDICINE | Facility: HOSPITAL | Age: 70
End: 2025-08-26
Payer: MEDICARE

## 2025-08-26 DIAGNOSIS — G47.421 NARCOLEPSY DUE TO UNDERLYING CONDITION WITH CATAPLEXY (HHS-HCC): Primary | ICD-10-CM

## 2025-08-27 ENCOUNTER — TELEPHONE (OUTPATIENT)
Dept: PRIMARY CARE | Facility: CLINIC | Age: 70
End: 2025-08-27
Payer: MEDICARE

## 2025-08-27 RX ORDER — DEXTROAMPHETAMINE SACCHARATE, AMPHETAMINE ASPARTATE, DEXTROAMPHETAMINE SULFATE AND AMPHETAMINE SULFATE 2.5; 2.5; 2.5; 2.5 MG/1; MG/1; MG/1; MG/1
1 TABLET ORAL 2 TIMES DAILY
Qty: 60 TABLET | Refills: 0 | Status: SHIPPED | OUTPATIENT
Start: 2025-08-27

## 2025-08-27 RX ORDER — DEXTROAMPHETAMINE SACCHARATE, AMPHETAMINE ASPARTATE, DEXTROAMPHETAMINE SULFATE AND AMPHETAMINE SULFATE 5; 5; 5; 5 MG/1; MG/1; MG/1; MG/1
1 TABLET ORAL DAILY
Qty: 30 TABLET | Refills: 0 | Status: SHIPPED | OUTPATIENT
Start: 2025-08-27

## 2025-11-07 ENCOUNTER — APPOINTMENT (OUTPATIENT)
Dept: PRIMARY CARE | Facility: CLINIC | Age: 70
End: 2025-11-07
Payer: MEDICARE

## 2026-01-19 ENCOUNTER — APPOINTMENT (OUTPATIENT)
Dept: SLEEP MEDICINE | Facility: CLINIC | Age: 71
End: 2026-01-19
Payer: MEDICARE

## (undated) DEVICE — SUTURE, CTD, VICRYL, 2-0, UND, BR, CT-2

## (undated) DEVICE — SUTURE, V-LOC, 3-0, 23IN, P-12, 90 ABS

## (undated) DEVICE — DRAPE PACK, TOTAL HIP, CUSTOM, GEAUGA

## (undated) DEVICE — APPLICATOR, CHLORAPREP, W/ORANGE TINT, 26ML

## (undated) DEVICE — DRAPE, C-ARM IMAGE

## (undated) DEVICE — DRAPE, ISOLATION, ANTIMICROBIAL, IOBAN, W/FILM & POUCH, LARGE, 32 X 70 CM

## (undated) DEVICE — IRRIGATION SYSTEM, WOUND, PULSAVAC PLUS

## (undated) DEVICE — COVER, TABLE, 44 X 75 IN, DISPOSABLE, LF, STERILE

## (undated) DEVICE — DRAPE, SHEET, UTILITY, NON ABSORBENT, 18 X 26 IN, LF

## (undated) DEVICE — SUTURE, QUILL, BARBED, PDO, 2, 24 X 24CM, T8 36MM TAPER POINT, 1/2 CIRCLE

## (undated) DEVICE — BLADE, OSCILLATOR 19.5 X 86 X 1.27MM

## (undated) DEVICE — SEALER, BIPOLAR, AQUA MANTYS 6.0

## (undated) DEVICE — IRRIGATION SYSTEM, WOUND, SURGIPHOR, 450ML, STERILE

## (undated) DEVICE — SYRINGE, 30 CC, LUER LOCK

## (undated) DEVICE — DRAPE, HIP, TIBURON  HD, W/POUCHES

## (undated) DEVICE — SKIN CLOSURE SYS, PREMIERPRO EXOFIN, 1-4CM X 22CM, 1.75G TUBE

## (undated) DEVICE — NEEDLE, SPINAL, QUINCKE, 18 G X 3.5 IN, PINK HUB

## (undated) DEVICE — HOOD, SURGICAL, FLYTE, T7 PLUS, PEEL AWAY SHIELD

## (undated) DEVICE — DRESSING, MEPILEX BORDER, POST-OP AG, 4 X 10 IN

## (undated) DEVICE — KIT, MINOR, DOUBLE BASIN

## (undated) DEVICE — SUTURE, VICRYL, 1, 24 IN, CTD, UNDYED

## (undated) DEVICE — TIP, SUCTION, YANKAUER, FLEXIBLE